# Patient Record
Sex: FEMALE | Race: WHITE | HISPANIC OR LATINO | Employment: FULL TIME | URBAN - METROPOLITAN AREA
[De-identification: names, ages, dates, MRNs, and addresses within clinical notes are randomized per-mention and may not be internally consistent; named-entity substitution may affect disease eponyms.]

---

## 2019-08-21 PROBLEM — E28.2 PCOS (POLYCYSTIC OVARIAN SYNDROME): Status: ACTIVE | Noted: 2019-08-21

## 2021-04-21 PROBLEM — M54.2 NECK PAIN: Status: ACTIVE | Noted: 2021-04-21

## 2021-04-21 PROBLEM — M25.511 ACUTE PAIN OF RIGHT SHOULDER: Status: ACTIVE | Noted: 2021-04-21

## 2021-04-28 ENCOUNTER — OFFICE VISIT (OUTPATIENT)
Dept: NEUROSURGERY | Facility: CLINIC | Age: 31
End: 2021-04-28
Payer: OTHER GOVERNMENT

## 2021-04-28 VITALS
DIASTOLIC BLOOD PRESSURE: 87 MMHG | BODY MASS INDEX: 26.68 KG/M2 | HEART RATE: 107 BPM | HEIGHT: 67 IN | WEIGHT: 170 LBS | SYSTOLIC BLOOD PRESSURE: 137 MMHG

## 2021-04-28 DIAGNOSIS — M54.12 CERVICAL RADICULOPATHY: ICD-10-CM

## 2021-04-28 DIAGNOSIS — M54.2 NECK PAIN: ICD-10-CM

## 2021-04-28 PROCEDURE — 99204 OFFICE O/P NEW MOD 45 MIN: CPT | Mod: PBBFAC,PN | Performed by: PHYSICIAN ASSISTANT

## 2021-04-28 PROCEDURE — 99999 PR PBB SHADOW E&M-NEW PATIENT-LVL IV: CPT | Mod: PBBFAC,,, | Performed by: PHYSICIAN ASSISTANT

## 2021-04-28 PROCEDURE — 99204 PR OFFICE/OUTPT VISIT, NEW, LEVL IV, 45-59 MIN: ICD-10-PCS | Mod: S$PBB,,, | Performed by: PHYSICIAN ASSISTANT

## 2021-04-28 PROCEDURE — 99999 PR PBB SHADOW E&M-NEW PATIENT-LVL IV: ICD-10-PCS | Mod: PBBFAC,,, | Performed by: PHYSICIAN ASSISTANT

## 2021-04-28 PROCEDURE — 99204 OFFICE O/P NEW MOD 45 MIN: CPT | Mod: S$PBB,,, | Performed by: PHYSICIAN ASSISTANT

## 2021-05-10 ENCOUNTER — PATIENT MESSAGE (OUTPATIENT)
Dept: RESEARCH | Facility: HOSPITAL | Age: 31
End: 2021-05-10

## 2021-05-11 ENCOUNTER — HOSPITAL ENCOUNTER (OUTPATIENT)
Dept: RADIOLOGY | Facility: HOSPITAL | Age: 31
Discharge: HOME | End: 2021-05-11
Attending: PHYSICIAN ASSISTANT
Payer: OTHER GOVERNMENT

## 2021-05-11 DIAGNOSIS — M54.12 CERVICAL RADICULOPATHY: ICD-10-CM

## 2021-05-11 PROCEDURE — 72040 X-RAY EXAM NECK SPINE 2-3 VW: CPT | Mod: TC,FY,PO

## 2021-05-11 PROCEDURE — 72040 X-RAY EXAM NECK SPINE 2-3 VW: CPT | Mod: 26,,, | Performed by: RADIOLOGY

## 2021-05-11 PROCEDURE — 72040 XR CERVICAL SPINE FLEXION  AND EXTENSION ONLY: ICD-10-PCS | Mod: 26,,, | Performed by: RADIOLOGY

## 2021-05-14 ENCOUNTER — TELEPHONE (OUTPATIENT)
Dept: PAIN MEDICINE | Facility: CLINIC | Age: 31
End: 2021-05-14

## 2021-05-14 DIAGNOSIS — Z11.9 SCREENING EXAMINATION FOR INFECTIOUS DISEASE: ICD-10-CM

## 2021-05-14 DIAGNOSIS — M54.12 CERVICAL RADICULOPATHY: Primary | ICD-10-CM

## 2021-05-14 RX ORDER — SODIUM CHLORIDE, SODIUM LACTATE, POTASSIUM CHLORIDE, CALCIUM CHLORIDE 600; 310; 30; 20 MG/100ML; MG/100ML; MG/100ML; MG/100ML
INJECTION, SOLUTION INTRAVENOUS CONTINUOUS
Status: CANCELLED | OUTPATIENT
Start: 2021-05-31

## 2021-05-28 ENCOUNTER — LAB VISIT (OUTPATIENT)
Dept: FAMILY MEDICINE | Facility: CLINIC | Age: 31
End: 2021-05-28
Payer: OTHER GOVERNMENT

## 2021-05-28 DIAGNOSIS — Z11.9 SCREENING EXAMINATION FOR INFECTIOUS DISEASE: ICD-10-CM

## 2021-05-28 PROCEDURE — U0003 INFECTIOUS AGENT DETECTION BY NUCLEIC ACID (DNA OR RNA); SEVERE ACUTE RESPIRATORY SYNDROME CORONAVIRUS 2 (SARS-COV-2) (CORONAVIRUS DISEASE [COVID-19]), AMPLIFIED PROBE TECHNIQUE, MAKING USE OF HIGH THROUGHPUT TECHNOLOGIES AS DESCRIBED BY CMS-2020-01-R: HCPCS | Performed by: ANESTHESIOLOGY

## 2021-05-28 PROCEDURE — U0005 INFEC AGEN DETEC AMPLI PROBE: HCPCS | Performed by: ANESTHESIOLOGY

## 2021-05-29 LAB — SARS-COV-2 RNA RESP QL NAA+PROBE: NOT DETECTED

## 2021-05-31 ENCOUNTER — HOSPITAL ENCOUNTER (OUTPATIENT)
Dept: RADIOLOGY | Facility: HOSPITAL | Age: 31
Discharge: HOME | End: 2021-05-31
Attending: ANESTHESIOLOGY
Payer: OTHER GOVERNMENT

## 2021-05-31 ENCOUNTER — HOSPITAL ENCOUNTER (OUTPATIENT)
Facility: HOSPITAL | Age: 31
Discharge: HOME | End: 2021-05-31
Attending: ANESTHESIOLOGY | Admitting: ANESTHESIOLOGY
Payer: OTHER GOVERNMENT

## 2021-05-31 DIAGNOSIS — M54.12 CERVICAL RADICULOPATHY: ICD-10-CM

## 2021-05-31 DIAGNOSIS — M54.2 NECK PAIN: ICD-10-CM

## 2021-05-31 LAB
B-HCG UR QL: NEGATIVE
CTP QC/QA: YES

## 2021-05-31 PROCEDURE — 62321 NJX INTERLAMINAR CRV/THRC: CPT | Mod: PO | Performed by: ANESTHESIOLOGY

## 2021-05-31 PROCEDURE — 76000 FLUOROSCOPY <1 HR PHYS/QHP: CPT | Mod: TC,PO

## 2021-05-31 PROCEDURE — 62321 NJX INTERLAMINAR CRV/THRC: CPT | Mod: ,,, | Performed by: ANESTHESIOLOGY

## 2021-05-31 PROCEDURE — 81025 URINE PREGNANCY TEST: CPT | Mod: PO | Performed by: ANESTHESIOLOGY

## 2021-05-31 PROCEDURE — 62321 PR INJ CERV/THORAC, W/GUIDANCE: ICD-10-PCS | Mod: ,,, | Performed by: ANESTHESIOLOGY

## 2021-05-31 PROCEDURE — 25500020 PHARM REV CODE 255: Mod: PO | Performed by: ANESTHESIOLOGY

## 2021-05-31 PROCEDURE — 25000003 PHARM REV CODE 250: Mod: PO | Performed by: ANESTHESIOLOGY

## 2021-05-31 PROCEDURE — A4216 STERILE WATER/SALINE, 10 ML: HCPCS | Mod: PO | Performed by: ANESTHESIOLOGY

## 2021-05-31 PROCEDURE — 63600175 PHARM REV CODE 636 W HCPCS: Mod: PO | Performed by: ANESTHESIOLOGY

## 2021-05-31 RX ORDER — SODIUM CHLORIDE, SODIUM LACTATE, POTASSIUM CHLORIDE, CALCIUM CHLORIDE 600; 310; 30; 20 MG/100ML; MG/100ML; MG/100ML; MG/100ML
INJECTION, SOLUTION INTRAVENOUS CONTINUOUS
Status: DISCONTINUED | OUTPATIENT
Start: 2021-05-31 | End: 2021-05-31 | Stop reason: HOSPADM

## 2021-05-31 RX ORDER — METHYLPREDNISOLONE ACETATE 80 MG/ML
INJECTION, SUSPENSION INTRA-ARTICULAR; INTRALESIONAL; INTRAMUSCULAR; SOFT TISSUE
Status: DISCONTINUED | OUTPATIENT
Start: 2021-05-31 | End: 2021-05-31 | Stop reason: HOSPADM

## 2021-05-31 RX ORDER — LIDOCAINE HYDROCHLORIDE 10 MG/ML
INJECTION, SOLUTION EPIDURAL; INFILTRATION; INTRACAUDAL; PERINEURAL
Status: DISCONTINUED | OUTPATIENT
Start: 2021-05-31 | End: 2021-05-31 | Stop reason: HOSPADM

## 2021-05-31 RX ORDER — MIDAZOLAM HYDROCHLORIDE 2 MG/2ML
INJECTION, SOLUTION INTRAMUSCULAR; INTRAVENOUS
Status: DISCONTINUED | OUTPATIENT
Start: 2021-05-31 | End: 2021-05-31 | Stop reason: HOSPADM

## 2021-05-31 RX ORDER — SODIUM CHLORIDE 9 MG/ML
INJECTION, SOLUTION INTRAMUSCULAR; INTRAVENOUS; SUBCUTANEOUS
Status: DISCONTINUED | OUTPATIENT
Start: 2021-05-31 | End: 2021-05-31 | Stop reason: HOSPADM

## 2021-05-31 RX ADMIN — SODIUM CHLORIDE, SODIUM LACTATE, POTASSIUM CHLORIDE, AND CALCIUM CHLORIDE: .6; .31; .03; .02 INJECTION, SOLUTION INTRAVENOUS at 12:05

## 2021-06-01 VITALS
SYSTOLIC BLOOD PRESSURE: 104 MMHG | DIASTOLIC BLOOD PRESSURE: 72 MMHG | OXYGEN SATURATION: 100 % | TEMPERATURE: 98 F | HEIGHT: 67 IN | BODY MASS INDEX: 26.53 KG/M2 | WEIGHT: 169 LBS | RESPIRATION RATE: 16 BRPM | HEART RATE: 83 BPM

## 2021-06-25 ENCOUNTER — OFFICE VISIT (OUTPATIENT)
Dept: PAIN MEDICINE | Facility: CLINIC | Age: 31
End: 2021-06-25
Payer: OTHER GOVERNMENT

## 2021-06-25 ENCOUNTER — HOSPITAL ENCOUNTER (OUTPATIENT)
Dept: RADIOLOGY | Facility: HOSPITAL | Age: 31
Discharge: HOME | End: 2021-06-25
Attending: ANESTHESIOLOGY
Payer: OTHER GOVERNMENT

## 2021-06-25 VITALS
HEIGHT: 67 IN | TEMPERATURE: 98 F | SYSTOLIC BLOOD PRESSURE: 112 MMHG | DIASTOLIC BLOOD PRESSURE: 60 MMHG | BODY MASS INDEX: 27.86 KG/M2 | RESPIRATION RATE: 20 BRPM | OXYGEN SATURATION: 99 % | WEIGHT: 177.5 LBS | HEART RATE: 100 BPM

## 2021-06-25 DIAGNOSIS — M54.9 DORSALGIA, UNSPECIFIED: ICD-10-CM

## 2021-06-25 DIAGNOSIS — M50.30 DDD (DEGENERATIVE DISC DISEASE), CERVICAL: ICD-10-CM

## 2021-06-25 DIAGNOSIS — M51.36 DDD (DEGENERATIVE DISC DISEASE), LUMBAR: ICD-10-CM

## 2021-06-25 DIAGNOSIS — M47.812 CERVICAL SPONDYLOSIS: ICD-10-CM

## 2021-06-25 DIAGNOSIS — G44.89 HEADACHE SYNDROME: ICD-10-CM

## 2021-06-25 DIAGNOSIS — M51.36 DDD (DEGENERATIVE DISC DISEASE), LUMBAR: Primary | ICD-10-CM

## 2021-06-25 PROCEDURE — 99214 OFFICE O/P EST MOD 30 MIN: CPT | Mod: PBBFAC,25,PN | Performed by: ANESTHESIOLOGY

## 2021-06-25 PROCEDURE — 72110 X-RAY EXAM L-2 SPINE 4/>VWS: CPT | Mod: 26,,, | Performed by: RADIOLOGY

## 2021-06-25 PROCEDURE — 72110 XR LUMBAR SPINE COMPLETE 5 VIEW: ICD-10-PCS | Mod: 26,,, | Performed by: RADIOLOGY

## 2021-06-25 PROCEDURE — 99999 PR PBB SHADOW E&M-EST. PATIENT-LVL IV: ICD-10-PCS | Mod: PBBFAC,,, | Performed by: ANESTHESIOLOGY

## 2021-06-25 PROCEDURE — 72110 X-RAY EXAM L-2 SPINE 4/>VWS: CPT | Mod: TC,PO

## 2021-06-25 PROCEDURE — 99215 PR OFFICE/OUTPT VISIT, EST, LEVL V, 40-54 MIN: ICD-10-PCS | Mod: S$PBB,,, | Performed by: ANESTHESIOLOGY

## 2021-06-25 PROCEDURE — 99215 OFFICE O/P EST HI 40 MIN: CPT | Mod: S$PBB,,, | Performed by: ANESTHESIOLOGY

## 2021-06-25 PROCEDURE — 99999 PR PBB SHADOW E&M-EST. PATIENT-LVL IV: CPT | Mod: PBBFAC,,, | Performed by: ANESTHESIOLOGY

## 2021-06-25 RX ORDER — NABUMETONE 750 MG/1
750 TABLET, FILM COATED ORAL 2 TIMES DAILY PRN
Qty: 30 TABLET | Refills: 1 | Status: SHIPPED | OUTPATIENT
Start: 2021-06-25 | End: 2021-09-24 | Stop reason: SDUPTHER

## 2021-06-25 RX ORDER — TIZANIDINE 2 MG/1
4 TABLET ORAL NIGHTLY PRN
Qty: 30 TABLET | Refills: 1 | Status: SHIPPED | OUTPATIENT
Start: 2021-06-25 | End: 2021-09-24 | Stop reason: SDUPTHER

## 2021-06-28 ENCOUNTER — TELEPHONE (OUTPATIENT)
Dept: PAIN MEDICINE | Facility: CLINIC | Age: 31
End: 2021-06-28

## 2021-06-30 ENCOUNTER — TELEPHONE (OUTPATIENT)
Dept: PAIN MEDICINE | Facility: CLINIC | Age: 31
End: 2021-06-30

## 2021-06-30 ENCOUNTER — PATIENT MESSAGE (OUTPATIENT)
Dept: PAIN MEDICINE | Facility: CLINIC | Age: 31
End: 2021-06-30

## 2021-07-02 ENCOUNTER — TELEPHONE (OUTPATIENT)
Dept: PAIN MEDICINE | Facility: CLINIC | Age: 31
End: 2021-07-02

## 2021-07-02 DIAGNOSIS — M51.36 DDD (DEGENERATIVE DISC DISEASE), LUMBAR: Primary | ICD-10-CM

## 2021-07-16 ENCOUNTER — PATIENT MESSAGE (OUTPATIENT)
Dept: PAIN MEDICINE | Facility: CLINIC | Age: 31
End: 2021-07-16

## 2021-07-19 ENCOUNTER — PATIENT MESSAGE (OUTPATIENT)
Dept: PAIN MEDICINE | Facility: CLINIC | Age: 31
End: 2021-07-19

## 2021-07-19 DIAGNOSIS — M54.16 BILATERAL LUMBAR RADICULOPATHY: Primary | ICD-10-CM

## 2021-07-19 DIAGNOSIS — M54.9 DORSALGIA, UNSPECIFIED: ICD-10-CM

## 2021-07-20 ENCOUNTER — PATIENT MESSAGE (OUTPATIENT)
Dept: PAIN MEDICINE | Facility: CLINIC | Age: 31
End: 2021-07-20

## 2021-07-22 RX ORDER — GABAPENTIN 300 MG/1
300 CAPSULE ORAL NIGHTLY
Qty: 30 CAPSULE | Refills: 1 | Status: SHIPPED | OUTPATIENT
Start: 2021-07-22 | End: 2021-09-24 | Stop reason: SDUPTHER

## 2021-07-23 ENCOUNTER — PATIENT MESSAGE (OUTPATIENT)
Dept: PAIN MEDICINE | Facility: CLINIC | Age: 31
End: 2021-07-23

## 2021-08-06 ENCOUNTER — TELEPHONE (OUTPATIENT)
Dept: PAIN MEDICINE | Facility: CLINIC | Age: 31
End: 2021-08-06

## 2021-08-06 ENCOUNTER — HOSPITAL ENCOUNTER (OUTPATIENT)
Dept: RADIOLOGY | Facility: HOSPITAL | Age: 31
Discharge: HOME | End: 2021-08-06
Attending: ANESTHESIOLOGY
Payer: OTHER GOVERNMENT

## 2021-08-06 DIAGNOSIS — M54.16 BILATERAL LUMBAR RADICULOPATHY: ICD-10-CM

## 2021-08-06 DIAGNOSIS — Z01.818 PRE-OP TESTING: ICD-10-CM

## 2021-08-06 DIAGNOSIS — M54.9 DORSALGIA, UNSPECIFIED: ICD-10-CM

## 2021-08-06 PROCEDURE — 72148 MRI LUMBAR SPINE WITHOUT CONTRAST: ICD-10-PCS | Mod: 26,,, | Performed by: RADIOLOGY

## 2021-08-06 PROCEDURE — 72148 MRI LUMBAR SPINE W/O DYE: CPT | Mod: 26,,, | Performed by: RADIOLOGY

## 2021-08-06 PROCEDURE — 72148 MRI LUMBAR SPINE W/O DYE: CPT | Mod: TC,PO

## 2021-08-09 DIAGNOSIS — M54.16 LUMBAR RADICULOPATHY: Primary | ICD-10-CM

## 2021-08-09 RX ORDER — ALPRAZOLAM 0.5 MG/1
1 TABLET, ORALLY DISINTEGRATING ORAL ONCE AS NEEDED
Status: CANCELLED | OUTPATIENT
Start: 2021-08-26 | End: 2033-01-21

## 2021-08-20 ENCOUNTER — PATIENT MESSAGE (OUTPATIENT)
Dept: SURGERY | Facility: HOSPITAL | Age: 31
End: 2021-08-20

## 2021-08-20 DIAGNOSIS — M54.16 LEFT LUMBAR RADICULOPATHY: ICD-10-CM

## 2021-08-20 DIAGNOSIS — M51.36 DDD (DEGENERATIVE DISC DISEASE), LUMBAR: Primary | ICD-10-CM

## 2021-08-23 ENCOUNTER — LAB VISIT (OUTPATIENT)
Dept: FAMILY MEDICINE | Facility: CLINIC | Age: 31
End: 2021-08-23
Payer: OTHER GOVERNMENT

## 2021-08-23 DIAGNOSIS — Z01.818 PRE-OP TESTING: ICD-10-CM

## 2021-08-23 PROCEDURE — U0005 INFEC AGEN DETEC AMPLI PROBE: HCPCS | Performed by: ANESTHESIOLOGY

## 2021-08-23 PROCEDURE — U0003 INFECTIOUS AGENT DETECTION BY NUCLEIC ACID (DNA OR RNA); SEVERE ACUTE RESPIRATORY SYNDROME CORONAVIRUS 2 (SARS-COV-2) (CORONAVIRUS DISEASE [COVID-19]), AMPLIFIED PROBE TECHNIQUE, MAKING USE OF HIGH THROUGHPUT TECHNOLOGIES AS DESCRIBED BY CMS-2020-01-R: HCPCS | Performed by: ANESTHESIOLOGY

## 2021-08-24 LAB
SARS-COV-2 RNA RESP QL NAA+PROBE: NOT DETECTED
SARS-COV-2- CYCLE NUMBER: NORMAL

## 2021-08-26 ENCOUNTER — HOSPITAL ENCOUNTER (OUTPATIENT)
Dept: RADIOLOGY | Facility: HOSPITAL | Age: 31
Discharge: HOME | End: 2021-08-26
Attending: ANESTHESIOLOGY
Payer: OTHER GOVERNMENT

## 2021-08-26 ENCOUNTER — HOSPITAL ENCOUNTER (OUTPATIENT)
Facility: HOSPITAL | Age: 31
Discharge: HOME | End: 2021-08-26
Attending: ANESTHESIOLOGY | Admitting: ANESTHESIOLOGY
Payer: OTHER GOVERNMENT

## 2021-08-26 DIAGNOSIS — M51.36 DDD (DEGENERATIVE DISC DISEASE), LUMBAR: ICD-10-CM

## 2021-08-26 DIAGNOSIS — M54.16 LUMBAR RADICULOPATHY: ICD-10-CM

## 2021-08-26 LAB
B-HCG UR QL: NEGATIVE
CTP QC/QA: YES

## 2021-08-26 PROCEDURE — 62323 NJX INTERLAMINAR LMBR/SAC: CPT | Mod: PO | Performed by: ANESTHESIOLOGY

## 2021-08-26 PROCEDURE — 62323 NJX INTERLAMINAR LMBR/SAC: CPT | Mod: ,,, | Performed by: ANESTHESIOLOGY

## 2021-08-26 PROCEDURE — 25500020 PHARM REV CODE 255: Mod: PO | Performed by: ANESTHESIOLOGY

## 2021-08-26 PROCEDURE — 81025 URINE PREGNANCY TEST: CPT | Mod: PO | Performed by: ANESTHESIOLOGY

## 2021-08-26 PROCEDURE — 76000 FLUOROSCOPY <1 HR PHYS/QHP: CPT | Mod: TC,PO

## 2021-08-26 PROCEDURE — 63600175 PHARM REV CODE 636 W HCPCS: Mod: PO | Performed by: ANESTHESIOLOGY

## 2021-08-26 PROCEDURE — 62323 PR INJ LUMBAR/SACRAL, W/IMAGING GUIDANCE: ICD-10-PCS | Mod: ,,, | Performed by: ANESTHESIOLOGY

## 2021-08-26 PROCEDURE — 25000003 PHARM REV CODE 250: Mod: PO | Performed by: ANESTHESIOLOGY

## 2021-08-26 RX ORDER — METHYLPREDNISOLONE ACETATE 80 MG/ML
INJECTION, SUSPENSION INTRA-ARTICULAR; INTRALESIONAL; INTRAMUSCULAR; SOFT TISSUE
Status: DISCONTINUED | OUTPATIENT
Start: 2021-08-26 | End: 2021-08-26 | Stop reason: HOSPADM

## 2021-08-26 RX ORDER — LIDOCAINE HYDROCHLORIDE 10 MG/ML
INJECTION, SOLUTION EPIDURAL; INFILTRATION; INTRACAUDAL; PERINEURAL
Status: DISCONTINUED | OUTPATIENT
Start: 2021-08-26 | End: 2021-08-26 | Stop reason: HOSPADM

## 2021-08-26 RX ORDER — ALPRAZOLAM 0.5 MG/1
1 TABLET, ORALLY DISINTEGRATING ORAL ONCE AS NEEDED
Status: COMPLETED | OUTPATIENT
Start: 2021-08-26 | End: 2021-08-26

## 2021-08-26 RX ADMIN — ALPRAZOLAM 1 MG: 0.5 TABLET, ORALLY DISINTEGRATING ORAL at 02:08

## 2021-08-27 VITALS
TEMPERATURE: 98 F | WEIGHT: 171 LBS | OXYGEN SATURATION: 99 % | SYSTOLIC BLOOD PRESSURE: 110 MMHG | HEIGHT: 67 IN | RESPIRATION RATE: 16 BRPM | BODY MASS INDEX: 26.84 KG/M2 | DIASTOLIC BLOOD PRESSURE: 68 MMHG | HEART RATE: 74 BPM

## 2021-09-16 ENCOUNTER — OFFICE VISIT (OUTPATIENT)
Dept: PAIN MEDICINE | Facility: CLINIC | Age: 31
End: 2021-09-16
Payer: OTHER GOVERNMENT

## 2021-09-16 VITALS
BODY MASS INDEX: 28.37 KG/M2 | OXYGEN SATURATION: 99 % | WEIGHT: 181.13 LBS | DIASTOLIC BLOOD PRESSURE: 59 MMHG | TEMPERATURE: 98 F | SYSTOLIC BLOOD PRESSURE: 110 MMHG | RESPIRATION RATE: 18 BRPM | HEART RATE: 87 BPM

## 2021-09-16 DIAGNOSIS — M54.16 LUMBAR RADICULOPATHY: Primary | ICD-10-CM

## 2021-09-16 DIAGNOSIS — Z01.818 PRE-OP TESTING: ICD-10-CM

## 2021-09-16 DIAGNOSIS — M51.36 DDD (DEGENERATIVE DISC DISEASE), LUMBAR: ICD-10-CM

## 2021-09-16 DIAGNOSIS — M54.12 CERVICAL RADICULOPATHY: ICD-10-CM

## 2021-09-16 DIAGNOSIS — M47.812 CERVICAL SPONDYLOSIS: ICD-10-CM

## 2021-09-16 PROCEDURE — 99999 PR PBB SHADOW E&M-EST. PATIENT-LVL IV: ICD-10-PCS | Mod: PBBFAC,,, | Performed by: ANESTHESIOLOGY

## 2021-09-16 PROCEDURE — 99213 PR OFFICE/OUTPT VISIT, EST, LEVL III, 20-29 MIN: ICD-10-PCS | Mod: S$PBB,,, | Performed by: ANESTHESIOLOGY

## 2021-09-16 PROCEDURE — 99214 OFFICE O/P EST MOD 30 MIN: CPT | Mod: PBBFAC,PN | Performed by: ANESTHESIOLOGY

## 2021-09-16 PROCEDURE — 99999 PR PBB SHADOW E&M-EST. PATIENT-LVL IV: CPT | Mod: PBBFAC,,, | Performed by: ANESTHESIOLOGY

## 2021-09-16 PROCEDURE — 99213 OFFICE O/P EST LOW 20 MIN: CPT | Mod: S$PBB,,, | Performed by: ANESTHESIOLOGY

## 2021-09-16 RX ORDER — ALPRAZOLAM 0.5 MG/1
1 TABLET, ORALLY DISINTEGRATING ORAL ONCE AS NEEDED
Status: CANCELLED | OUTPATIENT
Start: 2021-09-27 | End: 2033-02-22

## 2021-09-23 ENCOUNTER — TELEPHONE (OUTPATIENT)
Dept: PAIN MEDICINE | Facility: CLINIC | Age: 31
End: 2021-09-23

## 2021-09-24 RX ORDER — NABUMETONE 750 MG/1
750 TABLET, FILM COATED ORAL 2 TIMES DAILY PRN
Qty: 30 TABLET | Refills: 2 | Status: SHIPPED | OUTPATIENT
Start: 2021-09-24 | End: 2022-11-09

## 2021-09-24 RX ORDER — TIZANIDINE 2 MG/1
4 TABLET ORAL NIGHTLY PRN
Qty: 30 TABLET | Refills: 2 | Status: SHIPPED | OUTPATIENT
Start: 2021-09-24 | End: 2022-02-02

## 2021-09-24 RX ORDER — GABAPENTIN 300 MG/1
300 CAPSULE ORAL NIGHTLY
Qty: 30 CAPSULE | Refills: 5 | Status: SHIPPED | OUTPATIENT
Start: 2021-09-24 | End: 2021-11-12 | Stop reason: SDUPTHER

## 2021-09-28 ENCOUNTER — TELEPHONE (OUTPATIENT)
Dept: PAIN MEDICINE | Facility: CLINIC | Age: 31
End: 2021-09-28

## 2021-09-29 ENCOUNTER — TELEPHONE (OUTPATIENT)
Dept: PAIN MEDICINE | Facility: CLINIC | Age: 31
End: 2021-09-29

## 2021-10-04 ENCOUNTER — TELEPHONE (OUTPATIENT)
Dept: PAIN MEDICINE | Facility: CLINIC | Age: 31
End: 2021-10-04

## 2021-10-06 ENCOUNTER — PATIENT MESSAGE (OUTPATIENT)
Dept: SURGERY | Facility: HOSPITAL | Age: 31
End: 2021-10-06

## 2021-10-06 DIAGNOSIS — M54.12 CERVICAL RADICULOPATHY: ICD-10-CM

## 2021-10-06 DIAGNOSIS — M54.16 LUMBAR RADICULOPATHY: Primary | ICD-10-CM

## 2021-10-21 ENCOUNTER — HOSPITAL ENCOUNTER (OUTPATIENT)
Facility: HOSPITAL | Age: 31
Discharge: HOME | End: 2021-10-21
Attending: ANESTHESIOLOGY | Admitting: ANESTHESIOLOGY
Payer: OTHER GOVERNMENT

## 2021-10-21 ENCOUNTER — HOSPITAL ENCOUNTER (OUTPATIENT)
Dept: RADIOLOGY | Facility: HOSPITAL | Age: 31
Discharge: HOME | End: 2021-10-21
Attending: ANESTHESIOLOGY
Payer: OTHER GOVERNMENT

## 2021-10-21 DIAGNOSIS — M54.16 LUMBAR RADICULOPATHY: ICD-10-CM

## 2021-10-21 DIAGNOSIS — M51.36 DDD (DEGENERATIVE DISC DISEASE), LUMBAR: ICD-10-CM

## 2021-10-21 LAB
B-HCG UR QL: NEGATIVE
CTP QC/QA: YES

## 2021-10-21 PROCEDURE — 63600175 PHARM REV CODE 636 W HCPCS: Mod: PO | Performed by: ANESTHESIOLOGY

## 2021-10-21 PROCEDURE — 25500020 PHARM REV CODE 255: Mod: PO | Performed by: ANESTHESIOLOGY

## 2021-10-21 PROCEDURE — 25000003 PHARM REV CODE 250: Mod: PO | Performed by: ANESTHESIOLOGY

## 2021-10-21 PROCEDURE — 64483 NJX AA&/STRD TFRM EPI L/S 1: CPT | Mod: PO | Performed by: ANESTHESIOLOGY

## 2021-10-21 PROCEDURE — 81025 URINE PREGNANCY TEST: CPT | Mod: PO | Performed by: ANESTHESIOLOGY

## 2021-10-21 PROCEDURE — 64483 PR EPIDURAL INJ, ANES/STEROID, TRANSFORAMINAL, LUMB/SACR, SNGL LEVL: ICD-10-PCS | Mod: LT,,, | Performed by: ANESTHESIOLOGY

## 2021-10-21 PROCEDURE — 64483 NJX AA&/STRD TFRM EPI L/S 1: CPT | Mod: LT,,, | Performed by: ANESTHESIOLOGY

## 2021-10-21 PROCEDURE — 76000 FLUOROSCOPY <1 HR PHYS/QHP: CPT | Mod: TC,PO

## 2021-10-21 RX ORDER — ALPRAZOLAM 0.5 MG/1
1 TABLET, ORALLY DISINTEGRATING ORAL ONCE AS NEEDED
Status: COMPLETED | OUTPATIENT
Start: 2021-10-21 | End: 2021-10-21

## 2021-10-21 RX ORDER — BUPIVACAINE HYDROCHLORIDE 2.5 MG/ML
INJECTION, SOLUTION EPIDURAL; INFILTRATION; INTRACAUDAL
Status: DISCONTINUED | OUTPATIENT
Start: 2021-10-21 | End: 2021-10-21 | Stop reason: HOSPADM

## 2021-10-21 RX ORDER — METHYLPREDNISOLONE ACETATE 80 MG/ML
INJECTION, SUSPENSION INTRA-ARTICULAR; INTRALESIONAL; INTRAMUSCULAR; SOFT TISSUE
Status: DISCONTINUED | OUTPATIENT
Start: 2021-10-21 | End: 2021-10-21 | Stop reason: HOSPADM

## 2021-10-21 RX ORDER — LIDOCAINE HYDROCHLORIDE 10 MG/ML
INJECTION, SOLUTION EPIDURAL; INFILTRATION; INTRACAUDAL; PERINEURAL
Status: DISCONTINUED | OUTPATIENT
Start: 2021-10-21 | End: 2021-10-21 | Stop reason: HOSPADM

## 2021-10-21 RX ADMIN — ALPRAZOLAM 1 MG: 0.5 TABLET, ORALLY DISINTEGRATING ORAL at 03:10

## 2021-10-22 VITALS
HEART RATE: 84 BPM | OXYGEN SATURATION: 100 % | TEMPERATURE: 99 F | SYSTOLIC BLOOD PRESSURE: 109 MMHG | DIASTOLIC BLOOD PRESSURE: 65 MMHG | RESPIRATION RATE: 10 BRPM

## 2021-11-12 ENCOUNTER — OFFICE VISIT (OUTPATIENT)
Dept: PAIN MEDICINE | Facility: CLINIC | Age: 31
End: 2021-11-12
Payer: OTHER GOVERNMENT

## 2021-11-12 VITALS
DIASTOLIC BLOOD PRESSURE: 61 MMHG | OXYGEN SATURATION: 100 % | WEIGHT: 180.56 LBS | HEART RATE: 84 BPM | TEMPERATURE: 97 F | BODY MASS INDEX: 28.28 KG/M2 | RESPIRATION RATE: 20 BRPM | SYSTOLIC BLOOD PRESSURE: 111 MMHG

## 2021-11-12 DIAGNOSIS — M51.36 DDD (DEGENERATIVE DISC DISEASE), LUMBAR: ICD-10-CM

## 2021-11-12 DIAGNOSIS — M54.16 LUMBAR RADICULOPATHY: Primary | ICD-10-CM

## 2021-11-12 DIAGNOSIS — M54.12 CERVICAL RADICULOPATHY: ICD-10-CM

## 2021-11-12 DIAGNOSIS — M50.30 DDD (DEGENERATIVE DISC DISEASE), CERVICAL: ICD-10-CM

## 2021-11-12 PROCEDURE — 99214 PR OFFICE/OUTPT VISIT, EST, LEVL IV, 30-39 MIN: ICD-10-PCS | Mod: S$PBB,,, | Performed by: PHYSICIAN ASSISTANT

## 2021-11-12 PROCEDURE — 99214 OFFICE O/P EST MOD 30 MIN: CPT | Mod: S$PBB,,, | Performed by: PHYSICIAN ASSISTANT

## 2021-11-12 PROCEDURE — 99214 OFFICE O/P EST MOD 30 MIN: CPT | Mod: PBBFAC,PN | Performed by: PHYSICIAN ASSISTANT

## 2021-11-12 PROCEDURE — 99999 PR PBB SHADOW E&M-EST. PATIENT-LVL IV: CPT | Mod: PBBFAC,,, | Performed by: PHYSICIAN ASSISTANT

## 2021-11-12 PROCEDURE — 99999 PR PBB SHADOW E&M-EST. PATIENT-LVL IV: ICD-10-PCS | Mod: PBBFAC,,, | Performed by: PHYSICIAN ASSISTANT

## 2021-11-12 RX ORDER — GABAPENTIN 300 MG/1
600 CAPSULE ORAL NIGHTLY
Qty: 60 CAPSULE | Refills: 5 | Status: SHIPPED | OUTPATIENT
Start: 2021-11-12 | End: 2022-03-28

## 2021-11-12 RX ORDER — ALPRAZOLAM 0.5 MG/1
1 TABLET, ORALLY DISINTEGRATING ORAL ONCE AS NEEDED
Status: CANCELLED | OUTPATIENT
Start: 2021-12-06 | End: 2033-05-03

## 2021-12-06 ENCOUNTER — HOSPITAL ENCOUNTER (OUTPATIENT)
Dept: RADIOLOGY | Facility: HOSPITAL | Age: 31
Discharge: HOME | End: 2021-12-06
Attending: ANESTHESIOLOGY
Payer: OTHER GOVERNMENT

## 2021-12-06 ENCOUNTER — HOSPITAL ENCOUNTER (OUTPATIENT)
Facility: HOSPITAL | Age: 31
Discharge: HOME | End: 2021-12-06
Attending: ANESTHESIOLOGY | Admitting: ANESTHESIOLOGY
Payer: OTHER GOVERNMENT

## 2021-12-06 VITALS
RESPIRATION RATE: 16 BRPM | BODY MASS INDEX: 27.62 KG/M2 | TEMPERATURE: 98 F | OXYGEN SATURATION: 99 % | HEIGHT: 67 IN | WEIGHT: 176 LBS | DIASTOLIC BLOOD PRESSURE: 68 MMHG | SYSTOLIC BLOOD PRESSURE: 130 MMHG | HEART RATE: 78 BPM

## 2021-12-06 DIAGNOSIS — M54.16 LUMBAR RADICULOPATHY: ICD-10-CM

## 2021-12-06 DIAGNOSIS — M54.50 LOWER BACK PAIN: ICD-10-CM

## 2021-12-06 PROCEDURE — 64483 NJX AA&/STRD TFRM EPI L/S 1: CPT | Mod: LT,,, | Performed by: ANESTHESIOLOGY

## 2021-12-06 PROCEDURE — 64483 PR EPIDURAL INJ, ANES/STEROID, TRANSFORAMINAL, LUMB/SACR, SNGL LEVL: ICD-10-PCS | Mod: LT,,, | Performed by: ANESTHESIOLOGY

## 2021-12-06 PROCEDURE — 25000003 PHARM REV CODE 250: Mod: PO | Performed by: ANESTHESIOLOGY

## 2021-12-06 PROCEDURE — 63600175 PHARM REV CODE 636 W HCPCS: Mod: PO | Performed by: ANESTHESIOLOGY

## 2021-12-06 PROCEDURE — 64483 NJX AA&/STRD TFRM EPI L/S 1: CPT | Mod: PO | Performed by: ANESTHESIOLOGY

## 2021-12-06 PROCEDURE — 76000 FLUOROSCOPY <1 HR PHYS/QHP: CPT | Mod: TC,PO

## 2021-12-06 PROCEDURE — 25500020 PHARM REV CODE 255: Mod: PO | Performed by: ANESTHESIOLOGY

## 2021-12-06 RX ORDER — LIDOCAINE HYDROCHLORIDE 10 MG/ML
INJECTION, SOLUTION EPIDURAL; INFILTRATION; INTRACAUDAL; PERINEURAL
Status: DISCONTINUED | OUTPATIENT
Start: 2021-12-06 | End: 2021-12-06 | Stop reason: HOSPADM

## 2021-12-06 RX ORDER — ALPRAZOLAM 0.5 MG/1
1 TABLET, ORALLY DISINTEGRATING ORAL ONCE AS NEEDED
Status: COMPLETED | OUTPATIENT
Start: 2021-12-06 | End: 2021-12-06

## 2021-12-06 RX ORDER — BUPIVACAINE HYDROCHLORIDE 2.5 MG/ML
INJECTION, SOLUTION EPIDURAL; INFILTRATION; INTRACAUDAL
Status: DISCONTINUED | OUTPATIENT
Start: 2021-12-06 | End: 2021-12-06 | Stop reason: HOSPADM

## 2021-12-06 RX ORDER — METHYLPREDNISOLONE ACETATE 40 MG/ML
INJECTION, SUSPENSION INTRA-ARTICULAR; INTRALESIONAL; INTRAMUSCULAR; SOFT TISSUE
Status: DISCONTINUED | OUTPATIENT
Start: 2021-12-06 | End: 2021-12-06 | Stop reason: HOSPADM

## 2021-12-06 RX ADMIN — ALPRAZOLAM 1 MG: 0.5 TABLET, ORALLY DISINTEGRATING ORAL at 02:12

## 2022-01-03 ENCOUNTER — OFFICE VISIT (OUTPATIENT)
Dept: PAIN MEDICINE | Facility: CLINIC | Age: 32
End: 2022-01-03
Payer: OTHER GOVERNMENT

## 2022-01-03 ENCOUNTER — TELEPHONE (OUTPATIENT)
Dept: PAIN MEDICINE | Facility: CLINIC | Age: 32
End: 2022-01-03

## 2022-01-03 VITALS
OXYGEN SATURATION: 97 % | WEIGHT: 194.88 LBS | SYSTOLIC BLOOD PRESSURE: 110 MMHG | TEMPERATURE: 97 F | RESPIRATION RATE: 18 BRPM | DIASTOLIC BLOOD PRESSURE: 62 MMHG | BODY MASS INDEX: 30.52 KG/M2 | HEART RATE: 90 BPM

## 2022-01-03 DIAGNOSIS — Z01.818 PRE-OP TESTING: ICD-10-CM

## 2022-01-03 DIAGNOSIS — M54.12 CERVICAL RADICULOPATHY: ICD-10-CM

## 2022-01-03 DIAGNOSIS — M51.36 DDD (DEGENERATIVE DISC DISEASE), LUMBAR: ICD-10-CM

## 2022-01-03 DIAGNOSIS — M54.16 LUMBAR RADICULOPATHY: Primary | ICD-10-CM

## 2022-01-03 DIAGNOSIS — M50.30 DDD (DEGENERATIVE DISC DISEASE), CERVICAL: ICD-10-CM

## 2022-01-03 PROCEDURE — 99214 OFFICE O/P EST MOD 30 MIN: CPT | Mod: PBBFAC,PN | Performed by: PHYSICIAN ASSISTANT

## 2022-01-03 PROCEDURE — 99999 PR PBB SHADOW E&M-EST. PATIENT-LVL IV: ICD-10-PCS | Mod: PBBFAC,,, | Performed by: PHYSICIAN ASSISTANT

## 2022-01-03 PROCEDURE — 99214 OFFICE O/P EST MOD 30 MIN: CPT | Mod: S$PBB,CS,, | Performed by: PHYSICIAN ASSISTANT

## 2022-01-03 PROCEDURE — 99214 PR OFFICE/OUTPT VISIT, EST, LEVL IV, 30-39 MIN: ICD-10-PCS | Mod: S$PBB,CS,, | Performed by: PHYSICIAN ASSISTANT

## 2022-01-03 PROCEDURE — 99999 PR PBB SHADOW E&M-EST. PATIENT-LVL IV: CPT | Mod: PBBFAC,,, | Performed by: PHYSICIAN ASSISTANT

## 2022-01-03 RX ORDER — ALPRAZOLAM 0.5 MG/1
1 TABLET, ORALLY DISINTEGRATING ORAL ONCE AS NEEDED
Status: CANCELLED | OUTPATIENT
Start: 2022-01-21 | End: 2033-06-18

## 2022-01-03 NOTE — TELEPHONE ENCOUNTER
Spoke with patient and informed her that her TFESI will be done in the morning rather than the afternoon and she voiced understanding

## 2022-01-03 NOTE — PROGRESS NOTES
This note was completed with dictation software and grammatical errors may exist.    CC:  Neck pain, back pain    HPI:  The patient is a 31-year-old woman with a history of cervical DDD who presented in referral from [facility] for cervical radiculopathy.  She is status post left L5/S1 transforaminal epidural steroid injection on 12/06/2021 with additional relief for 3 weeks.  However, a week after Brock she used a rower for 10 minutes and for the next 4 days she had increased left leg pain.  This subsided some and she reports cleaning her house which caused increased pain as well.  The pain starts in the left low back radiates to the left buttock, posterior thigh and calf.  She has been participating in physical therapy and reports relief with dry needling.  Sometimes the physical therapy will increase her pain if she is already hurting worse than usual.  She also continues to have neck pain but not as bad as her low back and left leg.  Her neck pain is worse with driving in a car and she continues to have pins and needles in her right upper extremity.  She reports having 1 episode of right hand weakness and pain when playing video games with her son.  She denies having numbness or incontinence.    Prior history: She presents in follow-up for neck pain.  She was sent for direct procedure from to [facility].  She is status post C6/7 interlaminar ZAY on 05/31/2021 with 50% improvement, almost complete improvement of her right arm numbness and tingling however.  She reports that her neck pain began in March, 2021 when she was throwing a medicine ball over her head.  She had sudden pain in the neck, right shoulder and began having numbness and tingling in her right arm.  She reports that pain is aching, numb, sharp, shooting, tingling and deep.  She states that her pain was much worse when having to drive but also with running which she needs to do for her physical fitness test.  She does get  some relief with physical therapy, medications including ibuprofen, Mobic and stretching helps.  She does report having chronic migraine headaches, has not seen neurology for this.  In the last year because of her neck pain this seems to have exacerbated her headaches.    Her other complaint is back pain in the mid and low back for the last year.  Particularly in the low back, radiates out to the lateral hips, sometimes radiates along her posterior thighs into her calves.  This pain is worse with running, also worse with sitting too long and when moving from sitting to standing.  She denies any constant numbness, no bowel or bladder incontinence.    Pain intervention history: She is status post C6/7 interlaminar ZAY on 05/31/2021 with 50% improvement of neck pain, almost 90% improvement of right arm pain.She is status post L5/S1 ZAY on 08/26/2021 with No major benefit.   She is status post left L5/S1 transforaminal epidural steroid injection on 10/21/2021 with 50% relief.   She is status post left L5/S1 transforaminal epidural steroid injection on 12/06/2021 with additional relief for 3 weeks.     Spine surgeries:None    Antineuropathics:  Gabapentin 600 mg nightly  NSAIDs:  Mobic, ibuprofen both provide 30-50% relief.  Currently taking nabumetone 750 mg twice daily as needed  Physical therapy:  She has been going to physical therapy at [place] with some relief, working on both her neck and back  Antidepressants:  Muscle relaxers:  Tizanidine 2 mg nightly as needed  Opioids:  Antiplatelets/Anticoagulants:    ROS:  She reports fatigability, headaches and back pain.  Balance of review of systems is negative.    No results found for: LABA1C, HGBA1C    Lab Results   Component Value Date    WBC 8.91 06/01/2016    HGB 11.0 (L) 06/01/2016    HCT 32.5 (L) 06/01/2016    MCV 91 06/01/2016     06/01/2016             Past Medical History:   Diagnosis Date    Hyperlipidemia     Migraine     PCOS (polycystic ovarian  syndrome)        Past Surgical History:   Procedure Laterality Date    EPIDURAL STEROID INJECTION INTO CERVICAL SPINE N/A 5/31/2021    Procedure: Injection-steroid-epidural-cervical C6/7 spread to the right;  Surgeon: Jigar Gay MD;  Location: [facility] OR;  Service: Pain Management;  Laterality: N/A;    EPIDURAL STEROID INJECTION INTO LUMBAR SPINE N/A 8/26/2021    Procedure: Injection-steroid-epidural-lumbar l5/S1 interlaminar;  Surgeon: Jigar Gay MD;  Location: [facility] OR;  Service: Pain Management;  Laterality: N/A;    MOUTH SURGERY      TRANSFORAMINAL EPIDURAL INJECTION OF STEROID Left 10/21/2021    Procedure: Injection,steroid,epidural,transforaminal approach L5/S1;  Surgeon: Jigar Gay MD;  Location: [facility] OR;  Service: Pain Management;  Laterality: Left;    TRANSFORAMINAL EPIDURAL INJECTION OF STEROID Left 12/6/2021    Procedure: Injection,steroid,epidural,transforaminal approach L5/S1;  Surgeon: Jigar Gay MD;  Location: [facility] OR;  Service: Pain Management;  Laterality: Left;    VAGINAL DELIVERY      x3    WISDOM TOOTH EXTRACTION         Social History     Socioeconomic History    Marital status:    Tobacco Use    Smoking status: Never Smoker    Smokeless tobacco: Never Used   Substance and Sexual Activity    Alcohol use: No    Drug use: No    Sexual activity: Yes     Partners: Male         Medications/Allergies: See med card    Vitals:    01/03/22 0744   BP: 110/62   Pulse: 90   Resp: 18   Temp: 96.9 °F (36.1 °C)   TempSrc: Oral   SpO2: 97%   Weight: 88.4 kg (194 lb 14.2 oz)   PainSc:   4   PainLoc: Back         Physical exam:  Gen: A and O x3, pleasant, well-groomed  Skin: No rashes or obvious lesions  HEENT: PERRLA, no obvious deformities on ears or in canals.Trachea midline.  CVS: Regular rate and rhythm, normal palpable pulses.  Resp: Clear to auscultation bilaterally, no wheezes or rales.  Abdomen: Soft, NT/ND.  Musculoskeletal: Able to heel walk, toe  walk. No antalgic gait.     Neuro:  Upper extremities: 5/5 strength bilaterally, 4+/5 strength right    Lower extremities: 5/5 strength bilaterally  Reflexes: Brachioradialis 2+, Bicep 2+, Tricep 2+.  Patellar 2+, Achilles 2+ bilaterally.  Sensory: Intact and symmetrical to light touch and pinprick in C2-T1 dermatomes bilaterally. Intact and symmetrical to light touch and pinprick in L2-S1 dermatomes bilaterally.     Cervical Spine:  Cervical spine: ROM is full in flexion, extension and lateral rotation with increased pain on right oblique extension greater than left oblique extension.  Spurling's maneuver causes neck pain to either side.  Myofascial exam: No Tenderness to palpation across cervical paraspinous region bilaterally.    Lumbar spine:  Lumbar spine:  Range of motion is mildly reduced with both flexion and extension with increased pain on both maneuvers causing bilateral hip pain, low back pain    Frandy's test causes no increased pain on either side.    Supine straight leg raise is positive for left posterior thigh and calf pain.  Internal and external rotation of the hip causes no increased pain on either side.  Myofascial exam: No tenderness to palpation across lumbar paraspinous muscles.    Imagin21 MRI C-spine:  C2-C3: No disc herniation or significant posterior osseous ridging. No significant spinal canal or foraminal stenosis.   C3-C4: No disc herniation or significant posterior osseous ridging. No significant spinal canal or foraminal stenosis.   C4-C5: Mild disc osteophyte complex.  Slight ventral cord flattening with preserved ventral and dorsal CSF.  No significant foraminal stenosis.   C5-C6: Mild disc osteophyte complex.  Slight ventral cord flattening with preserved ventral and dorsal CSF.  No significant foraminal stenosis.   C6-C7: Mild disc osteophyte complex.  Preserved ventral and dorsal CSF.  No significant foraminal stenosis.   C7-T1: No disc herniation or significant  posterior osseous ridging. No significant spinal canal or foraminal stenosis.    8/6/21 MRI L-spine:  T12-L1: Unremarkable   L1-2: Unremarkable   L2-3: There is only a minimal disc bulge.  There is no spinal canal or significant foraminal stenosis.   L3-4: There is minimal bulging of the annulus.  There is also mild facet joint arthropathy.  There is flattening of the ventral dural sac.  There is borderline spinal stenosis.  The foramina are patent.   L4-5: There is a mild disc bulge slightly eccentric to the left.  There is mild facet joint arthropathy.  There is flattening of the ventral dural sac.  There is no spinal stenosis.  There is only mild bilateral foraminal stenosis without spinal stenosis.   L5-S1: There is mild disc space narrowing.  There is a mild disc bulge with superimposed broad left paracentral and proximal foraminal disc protrusion.  There is no spinal stenosis but there is crowding of the left lateral recess which could irritate the left S1 root.  Also, there is mild-to-moderate left foraminal stenosis.    Assessment:  The patient is a 31-year-old woman with a history of cervical DDD who presented in referral from [facility] for cervical radiculopathy.    1. Lumbar radiculopathy  Case Request Operating Room: Injection,steroid,epidural,transforaminal approach L5/S1   2. DDD (degenerative disc disease), lumbar     3. Cervical radiculopathy     4. DDD (degenerative disc disease), cervical     5. Pre-op testing  COVID-19 Routine Screening       Plan:  1. The patient did have additional benefit following the left L5/S1 transforaminal epidural steroid injection but had some worsening pain from exercising on a rower.  She notes that this or biking is the alternative physical fitness test for the  instead of running.  She states that she will ease back into this but for a shorter period of time to see how she does.  I am going to set her up to repeat a left L5/S1  transforaminal epidural steroid injection to see if she can get closer to full relief.  Lastly, if she does not have relief with conservative treatment she will follow up with Neurosurgery.  2. I encouraged her to continue physical therapy.  3. If her neck pain worsens we can repeat a cervical ZAY.  4. Follow-up in 4 weeks postprocedure or sooner as needed.

## 2022-01-18 ENCOUNTER — PATIENT MESSAGE (OUTPATIENT)
Dept: SURGERY | Facility: HOSPITAL | Age: 32
End: 2022-01-18
Payer: OTHER GOVERNMENT

## 2022-01-20 ENCOUNTER — PATIENT MESSAGE (OUTPATIENT)
Dept: SURGERY | Facility: HOSPITAL | Age: 32
End: 2022-01-20
Payer: OTHER GOVERNMENT

## 2022-02-02 ENCOUNTER — PATIENT MESSAGE (OUTPATIENT)
Dept: SURGERY | Facility: HOSPITAL | Age: 32
End: 2022-02-02
Payer: OTHER GOVERNMENT

## 2022-02-02 DIAGNOSIS — Z01.818 PRE-OP TESTING: ICD-10-CM

## 2022-02-18 ENCOUNTER — LAB VISIT (OUTPATIENT)
Dept: FAMILY MEDICINE | Facility: CLINIC | Age: 32
End: 2022-02-18
Payer: OTHER GOVERNMENT

## 2022-02-18 DIAGNOSIS — Z01.818 PRE-OP TESTING: ICD-10-CM

## 2022-02-18 PROCEDURE — U0003 INFECTIOUS AGENT DETECTION BY NUCLEIC ACID (DNA OR RNA); SEVERE ACUTE RESPIRATORY SYNDROME CORONAVIRUS 2 (SARS-COV-2) (CORONAVIRUS DISEASE [COVID-19]), AMPLIFIED PROBE TECHNIQUE, MAKING USE OF HIGH THROUGHPUT TECHNOLOGIES AS DESCRIBED BY CMS-2020-01-R: HCPCS | Performed by: ANESTHESIOLOGY

## 2022-02-18 PROCEDURE — U0005 INFEC AGEN DETEC AMPLI PROBE: HCPCS | Performed by: ANESTHESIOLOGY

## 2022-02-19 LAB — SARS-COV-2 RNA RESP QL NAA+PROBE: NOT DETECTED

## 2022-02-21 ENCOUNTER — HOSPITAL ENCOUNTER (OUTPATIENT)
Facility: HOSPITAL | Age: 32
Discharge: HOME | End: 2022-02-21
Attending: ANESTHESIOLOGY | Admitting: ANESTHESIOLOGY
Payer: OTHER GOVERNMENT

## 2022-02-21 ENCOUNTER — HOSPITAL ENCOUNTER (OUTPATIENT)
Dept: RADIOLOGY | Facility: HOSPITAL | Age: 32
Discharge: HOME | End: 2022-02-21
Attending: ANESTHESIOLOGY
Payer: OTHER GOVERNMENT

## 2022-02-21 DIAGNOSIS — M54.50 LOWER BACK PAIN: ICD-10-CM

## 2022-02-21 DIAGNOSIS — M54.16 LUMBAR RADICULOPATHY: ICD-10-CM

## 2022-02-21 PROBLEM — Z00.00 ANNUAL PHYSICAL EXAM: Status: ACTIVE | Noted: 2022-02-21

## 2022-02-21 LAB
B-HCG UR QL: NEGATIVE
CTP QC/QA: YES

## 2022-02-21 PROCEDURE — 76000 FLUOROSCOPY <1 HR PHYS/QHP: CPT | Mod: TC,PO

## 2022-02-21 PROCEDURE — 25000003 PHARM REV CODE 250: Mod: PO | Performed by: ANESTHESIOLOGY

## 2022-02-21 PROCEDURE — 64483 NJX AA&/STRD TFRM EPI L/S 1: CPT | Mod: LT,,, | Performed by: ANESTHESIOLOGY

## 2022-02-21 PROCEDURE — 64483 NJX AA&/STRD TFRM EPI L/S 1: CPT | Mod: PO | Performed by: ANESTHESIOLOGY

## 2022-02-21 PROCEDURE — 81025 URINE PREGNANCY TEST: CPT | Mod: PO | Performed by: ANESTHESIOLOGY

## 2022-02-21 PROCEDURE — 63600175 PHARM REV CODE 636 W HCPCS: Mod: PO | Performed by: ANESTHESIOLOGY

## 2022-02-21 PROCEDURE — 64483 PR EPIDURAL INJ, ANES/STEROID, TRANSFORAMINAL, LUMB/SACR, SNGL LEVL: ICD-10-PCS | Mod: LT,,, | Performed by: ANESTHESIOLOGY

## 2022-02-21 PROCEDURE — 25500020 PHARM REV CODE 255: Mod: PO | Performed by: ANESTHESIOLOGY

## 2022-02-21 RX ORDER — BUPIVACAINE HYDROCHLORIDE 2.5 MG/ML
INJECTION, SOLUTION EPIDURAL; INFILTRATION; INTRACAUDAL
Status: DISCONTINUED | OUTPATIENT
Start: 2022-02-21 | End: 2022-02-21 | Stop reason: HOSPADM

## 2022-02-21 RX ORDER — LIDOCAINE HYDROCHLORIDE 10 MG/ML
INJECTION, SOLUTION EPIDURAL; INFILTRATION; INTRACAUDAL; PERINEURAL
Status: DISCONTINUED | OUTPATIENT
Start: 2022-02-21 | End: 2022-02-21 | Stop reason: HOSPADM

## 2022-02-21 RX ORDER — METHYLPREDNISOLONE ACETATE 80 MG/ML
INJECTION, SUSPENSION INTRA-ARTICULAR; INTRALESIONAL; INTRAMUSCULAR; SOFT TISSUE
Status: DISCONTINUED | OUTPATIENT
Start: 2022-02-21 | End: 2022-02-21 | Stop reason: HOSPADM

## 2022-02-21 RX ORDER — ALPRAZOLAM 0.5 MG/1
1 TABLET, ORALLY DISINTEGRATING ORAL ONCE AS NEEDED
Status: COMPLETED | OUTPATIENT
Start: 2022-02-21 | End: 2022-02-21

## 2022-02-21 RX ADMIN — ALPRAZOLAM 1 MG: 0.5 TABLET, ORALLY DISINTEGRATING ORAL at 12:02

## 2022-02-21 NOTE — H&P
CC: Back pain    HPI: The patient is a 30yo woman with a history of lumbar radiculopathy here for left L5/S1 TFESI. There are no major changes in history and physical from 1/3/22.    Past Medical History:   Diagnosis Date    Hyperlipidemia     Migraine     PCOS (polycystic ovarian syndrome)        Past Surgical History:   Procedure Laterality Date    EPIDURAL STEROID INJECTION INTO CERVICAL SPINE N/A 5/31/2021    Procedure: Injection-steroid-epidural-cervical C6/7 spread to the right;  Surgeon: Jigar Gay MD;  Location: [facility] OR;  Service: Pain Management;  Laterality: N/A;    EPIDURAL STEROID INJECTION INTO LUMBAR SPINE N/A 8/26/2021    Procedure: Injection-steroid-epidural-lumbar l5/S1 interlaminar;  Surgeon: Jigar aGy MD;  Location: [facility] OR;  Service: Pain Management;  Laterality: N/A;    MOUTH SURGERY      TRANSFORAMINAL EPIDURAL INJECTION OF STEROID Left 10/21/2021    Procedure: Injection,steroid,epidural,transforaminal approach L5/S1;  Surgeon: Jigar Gay MD;  Location: [facility] OR;  Service: Pain Management;  Laterality: Left;    TRANSFORAMINAL EPIDURAL INJECTION OF STEROID Left 12/6/2021    Procedure: Injection,steroid,epidural,transforaminal approach L5/S1;  Surgeon: Jigar Gay MD;  Location: [facility] OR;  Service: Pain Management;  Laterality: Left;    VAGINAL DELIVERY      x3    WISDOM TOOTH EXTRACTION         Family History   Problem Relation Age of Onset    Cancer Father        Social History     Socioeconomic History    Marital status:    Tobacco Use    Smoking status: Never Smoker    Smokeless tobacco: Never Used   Substance and Sexual Activity    Alcohol use: No    Drug use: No    Sexual activity: Yes     Partners: Male       No current facility-administered medications for this encounter.       Review of patient's allergies indicates:   Allergen Reactions    No known drug allergies        Vitals:    02/17/22 1645 02/21/22 1245   BP:  (!) 112/59  "  Pulse:  90   Resp:  16   Temp:  98 °F (36.7 °C)   SpO2:  99%   Weight: 81.6 kg (180 lb)    Height: 5' 8" (1.727 m)        ASA 2, Mallampati 2    REVIEW OF SYSTEMS:     GENERAL: No weight loss, malaise or fevers.  HEENT:  No recent changes in vision or hearing  NECK: Negative for lumps, no difficulty with swallowing.  RESPIRATORY: Negative for cough, wheezing or shortness of breath, patient denies any recent URI.  CARDIOVASCULAR: Negative for chest pain, leg swelling or palpitations.  GI: Negative for abdominal discomfort, blood in stools or black stools or change in bowel habits.  MUSCULOSKELETAL: See HPI.  SKIN: Negative for lesions, rash, and itching.  PSYCH: No suicidal or homicidal ideations, no current mood disturbances.  HEMATOLOGY/LYMPHOLOGY: Negative for prolonged bleeding, bruising easily or swollen nodes. Patient is not currently taking any anti-coagulants  ENDO: No history of diabetes or thyroid dysfunction  NEURO: No history of syncope, paralysis, seizures or tremors.All other reviewed and negative other than HPI.    Physical exam:  Gen: A and O x3, pleasant, well-groomed  Skin: No rashes or obvious lesions  HEENT: PERRLA, no obvious deformities on ears or in canals. No thyroid masses, trachea midline, no palpable lymph nodes in neck, axilla.  CVS: Regular rate and rhythm, normal S1 and S2, no murmurs.  Resp: Clear to auscultation bilaterally.  Abdomen: Soft, NT/ND, normal bowel sounds present.  Musculoskeletal/Neuro: Moving all extremities    Assessment:  Lumbar radiculopathy  -     Case Request Operating Room: Injection,steroid,epidural,transforaminal approach L5/S1  -     Vital signs; Standing  -     Verify informed consent; Standing  -     Notify physician ; Standing  -     Notify physician ; Standing  -     Notify physician (specify); Standing  -     Diet NPO; Standing  -     Place in Outpatient; Standing  -     alprazolam ODT dissolvable tablet 1 mg    Other orders  -     IP VTE LOW RISK PATIENT; " Standing  -     Cancel: POCT Glucose, Hand-Held Device; Standing  -     POCT urine pregnancy; Standing

## 2022-02-21 NOTE — DISCHARGE INSTRUCTIONS
PAIN MANAGEMENT    Home care instructions   Apply ice pack to the injection site for 20 minute prior for the first 24 hours for soreness/discomfort at injection site   DO NOT USE HEAT FOR 24 HOURS   Keep site clean and dry for 24 hours, remove bandaid when desired   Do not drive until tomorrow  Take care when walking after a lumbar injection     STEROIDS OR RADIOFREQUENCY    May take 10-14 days for full effects  Avoid strenuous exercises for 2 days    Resume Aspirin, Plavix, or Coumadin the day after the procedure unless other wise instructed  Resume home medication as prescribed today      CALL PHYSICIAN FOR:  Severe increase in your usual pain or appearance of new pain  Prolonged or increasing weakness or numbness in the legs or arms  Fever greater then 100 degrees F..  Drainage from the incision site, redness, active bleeding or increased swelling at the injection site  Headache that increases when your head is upright and decreases when you lie flat    FOR EMERGENCIES:   Go directly to Emergency Department for Shortness of breath, chest pain, or problems breathing

## 2022-02-21 NOTE — OP NOTE
PROCEDURE DATE: 2/21/2022    PROCEDURE: Left L5/S1 transforaminal epidural steroid injection under fluoroscopy    DIAGNOSIS: Lumbar  Radiculopathy    Post op diagnosis: Same    PHYSICIAN: Jigar Gay MD    MEDICATIONS INJECTED:  Methylprednisolone 40mg (1ml) and 1ml 0.25% bupivicaine at each nerve root.     LOCAL ANESTHETIC INJECTED:  Lidocaine 1%. 4 ml per site.    SEDATION MEDICATIONS: none    ESTIMATED BLOOD LOSS:  none    COMPLICATIONS:  none    TECHNIQUE:   A time-out was taken to identify patient and procedure side prior to starting the procedure. The patient was placed in a prone position, prepped and draped in the usual sterile fashion using ChloraPrep and sterile towels.  The area to be injected was determined under fluoroscopic guidance in AP and oblique view.  Local anesthetic was given by raising a wheal and going down to the hub of a 25-gauge 1.5 inch needle.  In oblique view, a 5 inch 22-gauge bent-tip spinal needle was introduced towards 6 oclock position of the pedicle of each above named nerve root level.  The needle was walked medially then hinged into the neural foramen and position was confirmed in AP and lateral views.  Omnipaque contrast dye was injected to confirm appropriate placement and that there was no vascular uptake.  After negative aspiration for blood or CSF, the medication was then injected. This was performed at the left L5/S1 level(s). The patient tolerated the procedure well.    The patient was monitored after the procedure.  Patient was given post procedure and discharge instructions to follow at home. The patient was discharged in a stable condition.

## 2022-02-22 VITALS
HEIGHT: 68 IN | OXYGEN SATURATION: 97 % | WEIGHT: 180 LBS | DIASTOLIC BLOOD PRESSURE: 68 MMHG | BODY MASS INDEX: 27.28 KG/M2 | SYSTOLIC BLOOD PRESSURE: 113 MMHG | RESPIRATION RATE: 15 BRPM | TEMPERATURE: 97 F | HEART RATE: 84 BPM

## 2022-03-28 RX ORDER — METFORMIN HYDROCHLORIDE 500 MG/1
TABLET ORAL
Qty: 60 TABLET | OUTPATIENT
Start: 2022-03-28

## 2022-03-28 NOTE — TELEPHONE ENCOUNTER
This Rx Request does not qualify for assessment with the [facility]   Please review protocol details and the Care Due Message for extra clinical information    Reasons Rx Request may be deferred:  Labs/Vitals overdue  Labs/Vitals abnormal  Patient has been seen in the ED/Hospital since the last PCP visit  Pt due for OV with PCP    Note composed:1:39 PM 03/28/2022

## 2022-03-30 NOTE — PROGRESS NOTES
This note was completed with dictation software and grammatical errors may exist.    CC:  Neck pain, back pain    HPI:  The patient is a 32-year-old woman with a history of cervical DDD who presented in referral from [facility] for cervical radiculopathy.  She is status post left L5/S1 transforaminal epidural steroid injection on 02/21/2022 with about 50% relief.  She does state that her pain is more steady and she has less spikes in her pain than she did before.  It is more tolerable.  She has been biking, exercising, walking 2-3 miles at a time.  She does get some cramps in her left leg with walking and at night.  She does report some pins and needles intermittently in her left leg.  She was unable to tolerate gabapentin due to drowsiness in the mornings.  She denies weakness or numbness.    Prior history: She presents in follow-up for neck pain.  She was sent for direct procedure from to [facility].  She is status post C6/7 interlaminar ZAY on 05/31/2021 with 50% improvement, almost complete improvement of her right arm numbness and tingling however.  She reports that her neck pain began in March, 2021 when she was throwing a medicine ball over her head.  She had sudden pain in the neck, right shoulder and began having numbness and tingling in her right arm.  She reports that pain is aching, numb, sharp, shooting, tingling and deep.  She states that her pain was much worse when having to drive but also with running which she needs to do for her physical fitness test.  She does get some relief with physical therapy, medications including ibuprofen, Mobic and stretching helps.  She does report having chronic migraine headaches, has not seen neurology for this.  In the last year because of her neck pain this seems to have exacerbated her headaches.    Her other complaint is back pain in the mid and low back for the last year.  Particularly in the low back, radiates out to the lateral hips,  sometimes radiates along her posterior thighs into her calves.  This pain is worse with running, also worse with sitting too long and when moving from sitting to standing.  She denies any constant numbness, no bowel or bladder incontinence.    Pain intervention history: She is status post C6/7 interlaminar ZAY on 05/31/2021 with 50% improvement of neck pain, almost 90% improvement of right arm pain.She is status post L5/S1 ZAY on 08/26/2021 with No major benefit.   She is status post left L5/S1 transforaminal epidural steroid injection on 10/21/2021 with 50% relief.   She is status post left L5/S1 transforaminal epidural steroid injection on 12/06/2021 with additional relief for 3 weeks.   She is status post left L5/S1 transforaminal epidural steroid injection on 02/21/2022 with about 50% relief.      Spine surgeries:None    Antineuropathics:  Gabapentin 600 mg nightly  NSAIDs:  Mobic, ibuprofen both provide 30-50% relief.  Currently taking nabumetone 750 mg twice daily as needed  Physical therapy:  She has been going to physical therapy at [facility] with some relief, working on both her neck and back  Antidepressants:  Muscle relaxers:  Tizanidine 2 mg nightly as needed  Opioids:  Antiplatelets/Anticoagulants:    ROS:  She reports fatigability, headaches and back pain.  Balance of review of systems is negative.    No results found for: LABA1C, HGBA1C    Lab Results   Component Value Date    WBC 8.91 06/01/2016    HGB 11.0 (L) 06/01/2016    HCT 32.5 (L) 06/01/2016    MCV 91 06/01/2016     06/01/2016             Past Medical History:   Diagnosis Date    Hyperlipidemia     Migraine     PCOS (polycystic ovarian syndrome)        Past Surgical History:   Procedure Laterality Date    EPIDURAL STEROID INJECTION INTO CERVICAL SPINE N/A 5/31/2021    Procedure: Injection-steroid-epidural-cervical C6/7 spread to the right;  Surgeon: Jigar Gay MD;  Location: [facility] OR;  Service: Pain Management;  Laterality: N/A;  "   EPIDURAL STEROID INJECTION INTO LUMBAR SPINE N/A 8/26/2021    Procedure: Injection-steroid-epidural-lumbar l5/S1 interlaminar;  Surgeon: Jigar Gay MD;  Location: [facility] OR;  Service: Pain Management;  Laterality: N/A;    MOUTH SURGERY      TRANSFORAMINAL EPIDURAL INJECTION OF STEROID Left 10/21/2021    Procedure: Injection,steroid,epidural,transforaminal approach L5/S1;  Surgeon: Jigar Gay MD;  Location: [facility] OR;  Service: Pain Management;  Laterality: Left;    TRANSFORAMINAL EPIDURAL INJECTION OF STEROID Left 12/6/2021    Procedure: Injection,steroid,epidural,transforaminal approach L5/S1;  Surgeon: Jigar Gay MD;  Location: [facility] OR;  Service: Pain Management;  Laterality: Left;    TRANSFORAMINAL EPIDURAL INJECTION OF STEROID Left 2/21/2022    Procedure: Injection,steroid,epidural,transforaminal approach L5/S1;  Surgeon: Jigar Gay MD;  Location: [facility] OR;  Service: Pain Management;  Laterality: Left;    VAGINAL DELIVERY      x3    WISDOM TOOTH EXTRACTION         Social History     Socioeconomic History    Marital status:    Tobacco Use    Smoking status: Never Smoker    Smokeless tobacco: Never Used   Substance and Sexual Activity    Alcohol use: No    Drug use: No    Sexual activity: Yes     Partners: Male         Medications/Allergies: See med card    Vitals:    03/31/22 0821   BP: 131/83   Pulse: 96   Weight: 85.1 kg (187 lb 9.8 oz)   Height: 5' 9.6" (1.768 m)   PainSc:   6   PainLoc: Back         Physical exam:  Gen: A and O x3, pleasant, well-groomed  Skin: No rashes or obvious lesions  HEENT: PERRLA, no obvious deformities on ears or in canals.Trachea midline.  CVS: Regular rate and rhythm, normal palpable pulses.  Resp: Clear to auscultation bilaterally, no wheezes or rales.  Abdomen: Soft, NT/ND.  Musculoskeletal: Able to heel walk, toe walk. No antalgic gait.     Neuro:  Upper extremities: 5/5 strength bilaterally, 4+/5 strength right    Lower " extremities: 5/5 strength bilaterally  Reflexes: Brachioradialis 2+, Bicep 2+, Tricep 2+.  Patellar 2+, Achilles 2+ bilaterally.  Sensory: Intact and symmetrical to light touch and pinprick in C2-T1 dermatomes bilaterally. Intact and symmetrical to light touch and pinprick in L2-S1 dermatomes bilaterally.     Cervical Spine:  Cervical spine: ROM is full in flexion, extension and lateral rotation with increased pain on right oblique extension greater than left oblique extension.  Spurling's maneuver causes neck pain to either side.  Myofascial exam: No Tenderness to palpation across cervical paraspinous region bilaterally.    Lumbar spine:  Lumbar spine:  Range of motion is mildly reduced with both flexion and extension with increased pain on both maneuvers causing bilateral hip pain, low back pain    Frandy's test causes no increased pain on either side.    Supine straight leg raise is positive for left posterior thigh and calf pain.  Internal and external rotation of the hip causes no increased pain on either side.  Myofascial exam: No tenderness to palpation across lumbar paraspinous muscles.    Imagin21 MRI C-spine:  C2-C3: No disc herniation or significant posterior osseous ridging. No significant spinal canal or foraminal stenosis.   C3-C4: No disc herniation or significant posterior osseous ridging. No significant spinal canal or foraminal stenosis.   C4-C5: Mild disc osteophyte complex.  Slight ventral cord flattening with preserved ventral and dorsal CSF.  No significant foraminal stenosis.   C5-C6: Mild disc osteophyte complex.  Slight ventral cord flattening with preserved ventral and dorsal CSF.  No significant foraminal stenosis.   C6-C7: Mild disc osteophyte complex.  Preserved ventral and dorsal CSF.  No significant foraminal stenosis.   C7-T1: No disc herniation or significant posterior osseous ridging. No significant spinal canal or foraminal stenosis.    21 MRI L-spine:  T12-L1:  Unremarkable   L1-2: Unremarkable   L2-3: There is only a minimal disc bulge.  There is no spinal canal or significant foraminal stenosis.   L3-4: There is minimal bulging of the annulus.  There is also mild facet joint arthropathy.  There is flattening of the ventral dural sac.  There is borderline spinal stenosis.  The foramina are patent.   L4-5: There is a mild disc bulge slightly eccentric to the left.  There is mild facet joint arthropathy.  There is flattening of the ventral dural sac.  There is no spinal stenosis.  There is only mild bilateral foraminal stenosis without spinal stenosis.   L5-S1: There is mild disc space narrowing.  There is a mild disc bulge with superimposed broad left paracentral and proximal foraminal disc protrusion.  There is no spinal stenosis but there is crowding of the left lateral recess which could irritate the left S1 root.  Also, there is mild-to-moderate left foraminal stenosis.    Assessment:  The patient is a 32-year-old woman with a history of cervical DDD who presented in referral from [facility] for cervical radiculopathy.    1. Lumbar radiculopathy     2. DDD (degenerative disc disease), lumbar     3. Cervical radiculopathy     4. DDD (degenerative disc disease), cervical         Plan:  1. The patient did well following the left L5/S1 transforaminal epidural steroid injection.  Currently symptoms are tolerable and she is exercising regularly.  However, she is having some increased pain at night and difficulty sleeping soft and have her increase tizanidine to 4 mg nightly if tolerated.  2. Follow-up in 3 months or sooner as needed.

## 2022-03-31 ENCOUNTER — OFFICE VISIT (OUTPATIENT)
Dept: PAIN MEDICINE | Facility: CLINIC | Age: 32
End: 2022-03-31
Payer: OTHER GOVERNMENT

## 2022-03-31 VITALS
SYSTOLIC BLOOD PRESSURE: 131 MMHG | DIASTOLIC BLOOD PRESSURE: 83 MMHG | BODY MASS INDEX: 26.86 KG/M2 | HEART RATE: 96 BPM | HEIGHT: 70 IN | WEIGHT: 187.63 LBS

## 2022-03-31 DIAGNOSIS — M51.36 DDD (DEGENERATIVE DISC DISEASE), LUMBAR: ICD-10-CM

## 2022-03-31 DIAGNOSIS — M54.12 CERVICAL RADICULOPATHY: ICD-10-CM

## 2022-03-31 DIAGNOSIS — M54.16 LUMBAR RADICULOPATHY: Primary | ICD-10-CM

## 2022-03-31 DIAGNOSIS — M50.30 DDD (DEGENERATIVE DISC DISEASE), CERVICAL: ICD-10-CM

## 2022-03-31 PROCEDURE — 99213 OFFICE O/P EST LOW 20 MIN: CPT | Mod: PBBFAC,PN | Performed by: PHYSICIAN ASSISTANT

## 2022-03-31 PROCEDURE — 99213 OFFICE O/P EST LOW 20 MIN: CPT | Mod: S$PBB,,, | Performed by: PHYSICIAN ASSISTANT

## 2022-03-31 PROCEDURE — 99999 PR PBB SHADOW E&M-EST. PATIENT-LVL III: CPT | Mod: PBBFAC,,, | Performed by: PHYSICIAN ASSISTANT

## 2022-03-31 PROCEDURE — 99213 PR OFFICE/OUTPT VISIT, EST, LEVL III, 20-29 MIN: ICD-10-PCS | Mod: S$PBB,,, | Performed by: PHYSICIAN ASSISTANT

## 2022-03-31 PROCEDURE — 99999 PR PBB SHADOW E&M-EST. PATIENT-LVL III: ICD-10-PCS | Mod: PBBFAC,,, | Performed by: PHYSICIAN ASSISTANT

## 2022-03-31 RX ORDER — TIZANIDINE 2 MG/1
TABLET ORAL
Qty: 60 TABLET | Refills: 6 | Status: SHIPPED | OUTPATIENT
Start: 2022-03-31 | End: 2022-07-28

## 2022-05-23 PROBLEM — Z00.00 ANNUAL PHYSICAL EXAM: Status: RESOLVED | Noted: 2022-02-21 | Resolved: 2022-05-23

## 2022-08-15 ENCOUNTER — OFFICE VISIT (OUTPATIENT)
Dept: PAIN MEDICINE | Facility: CLINIC | Age: 32
End: 2022-08-15
Payer: OTHER GOVERNMENT

## 2022-08-15 VITALS
HEIGHT: 70 IN | DIASTOLIC BLOOD PRESSURE: 70 MMHG | SYSTOLIC BLOOD PRESSURE: 113 MMHG | WEIGHT: 192.56 LBS | BODY MASS INDEX: 27.57 KG/M2 | HEART RATE: 90 BPM | OXYGEN SATURATION: 97 %

## 2022-08-15 DIAGNOSIS — M54.16 BILATERAL LUMBAR RADICULOPATHY: Primary | ICD-10-CM

## 2022-08-15 DIAGNOSIS — M51.36 DDD (DEGENERATIVE DISC DISEASE), LUMBAR: ICD-10-CM

## 2022-08-15 PROCEDURE — 99999 PR PBB SHADOW E&M-EST. PATIENT-LVL III: CPT | Mod: PBBFAC,,, | Performed by: ANESTHESIOLOGY

## 2022-08-15 PROCEDURE — 99213 PR OFFICE/OUTPT VISIT, EST, LEVL III, 20-29 MIN: ICD-10-PCS | Mod: S$PBB,,, | Performed by: ANESTHESIOLOGY

## 2022-08-15 PROCEDURE — 99213 OFFICE O/P EST LOW 20 MIN: CPT | Mod: S$PBB,,, | Performed by: ANESTHESIOLOGY

## 2022-08-15 PROCEDURE — 99213 OFFICE O/P EST LOW 20 MIN: CPT | Mod: 25,PBBFAC,PN | Performed by: ANESTHESIOLOGY

## 2022-08-15 PROCEDURE — 96372 THER/PROPH/DIAG INJ SC/IM: CPT | Mod: PBBFAC,PN

## 2022-08-15 PROCEDURE — 99999 PR PBB SHADOW E&M-EST. PATIENT-LVL III: ICD-10-PCS | Mod: PBBFAC,,, | Performed by: ANESTHESIOLOGY

## 2022-08-15 RX ORDER — KETOROLAC TROMETHAMINE 30 MG/ML
30 INJECTION, SOLUTION INTRAMUSCULAR; INTRAVENOUS ONCE
Status: DISCONTINUED | OUTPATIENT
Start: 2022-08-15 | End: 2022-08-15

## 2022-08-15 RX ORDER — KETOROLAC TROMETHAMINE 30 MG/ML
60 INJECTION, SOLUTION INTRAMUSCULAR; INTRAVENOUS ONCE
Status: DISCONTINUED | OUTPATIENT
Start: 2022-08-15 | End: 2022-08-15

## 2022-08-15 RX ORDER — KETOROLAC TROMETHAMINE 30 MG/ML
30 INJECTION, SOLUTION INTRAMUSCULAR; INTRAVENOUS ONCE
Status: CANCELLED | OUTPATIENT
Start: 2022-08-15 | End: 2022-08-15

## 2022-08-15 RX ORDER — KETOROLAC TROMETHAMINE 30 MG/ML
30 INJECTION, SOLUTION INTRAMUSCULAR; INTRAVENOUS ONCE
Status: COMPLETED | OUTPATIENT
Start: 2022-08-15 | End: 2022-08-15

## 2022-08-15 RX ORDER — METHYLPREDNISOLONE 4 MG/1
TABLET ORAL
Qty: 21 EACH | Refills: 0 | Status: SHIPPED | OUTPATIENT
Start: 2022-08-15 | End: 2022-08-26

## 2022-08-15 RX ADMIN — KETOROLAC TROMETHAMINE 30 MG: 30 INJECTION, SOLUTION INTRAMUSCULAR; INTRAVENOUS at 10:08

## 2022-08-15 NOTE — PROGRESS NOTES
This note was completed with dictation software and grammatical errors may exist.    CC:  Neck pain, back pain    HPI:  The patient is a 32-year-old woman with a history of cervical DDD who presented in referral from [facility] for cervical radiculopathy.  The patient returns in follow-up today complaining of back pain.  She had actually been doing fairly well over the last 6 months, having some occasional pain in the back and into the left leg.  Nonetheless, she has been exercising by walking 3 days a week, no longer running.  She states that about a week ago she developed pain suddenly, no specific trauma that may have caused it but she did feel a twinge when she turned.  States that the pain is across both sides of her back, radiating into both legs.  Is worse with standing too long or sitting too long.  She states that it is fairly intense and she is having difficulty focusing or doing any type of work when she is standing.  She denies any aravind weakness, denies any bowel or bladder incontinence.        Prior history: She presents in follow-up for neck pain.  She was sent for direct procedure from to [facility].  She is status post C6/7 interlaminar ZAY on 05/31/2021 with 50% improvement, almost complete improvement of her right arm numbness and tingling however.  She reports that her neck pain began in March, 2021 when she was throwing a medicine ball over her head.  She had sudden pain in the neck, right shoulder and began having numbness and tingling in her right arm.  She reports that pain is aching, numb, sharp, shooting, tingling and deep.  She states that her pain was much worse when having to drive but also with running which she needs to do for her physical fitness test.  She does get some relief with physical therapy, medications including ibuprofen, Mobic and stretching helps.  She does report having chronic migraine headaches, has not seen neurology for this.  In the last year  because of her neck pain this seems to have exacerbated her headaches.    Her other complaint is back pain in the mid and low back for the last year.  Particularly in the low back, radiates out to the lateral hips, sometimes radiates along her posterior thighs into her calves.  This pain is worse with running, also worse with sitting too long and when moving from sitting to standing.  She denies any constant numbness, no bowel or bladder incontinence.    Pain intervention history: She is status post C6/7 interlaminar ZAY on 05/31/2021 with 50% improvement of neck pain, almost 90% improvement of right arm pain.She is status post L5/S1 ZAY on 08/26/2021 with No major benefit.   She is status post left L5/S1 transforaminal epidural steroid injection on 10/21/2021 with 50% relief.   She is status post left L5/S1 transforaminal epidural steroid injection on 12/06/2021 with additional relief for 3 weeks.   She is status post left L5/S1 transforaminal epidural steroid injection on 02/21/2022 with about 50% relief.      Spine surgeries:None    Antineuropathics:  Gabapentin 600 mg nightly  NSAIDs:  Mobic, ibuprofen both provide 30-50% relief.  Currently taking nabumetone 750 mg twice daily as needed  Physical therapy:  She has been going to physical therapy at [place] with some relief, working on both her neck and back  Antidepressants:  Muscle relaxers:  Tizanidine 2 mg nightly as needed  Opioids:  Antiplatelets/Anticoagulants:    ROS:  She reports fatigability, headaches and back pain.  Balance of review of systems is negative.    No results found for: LABA1C, HGBA1C    Lab Results   Component Value Date    WBC 8.91 06/01/2016    HGB 11.0 (L) 06/01/2016    HCT 32.5 (L) 06/01/2016    MCV 91 06/01/2016     06/01/2016             Past Medical History:   Diagnosis Date    Hyperlipidemia     Migraine     PCOS (polycystic ovarian syndrome)        Past Surgical History:   Procedure Laterality Date    EPIDURAL STEROID  "INJECTION INTO CERVICAL SPINE N/A 5/31/2021    Procedure: Injection-steroid-epidural-cervical C6/7 spread to the right;  Surgeon: Jigar Gay MD;  Location: [facility] OR;  Service: Pain Management;  Laterality: N/A;    EPIDURAL STEROID INJECTION INTO LUMBAR SPINE N/A 8/26/2021    Procedure: Injection-steroid-epidural-lumbar l5/S1 interlaminar;  Surgeon: Jigar Gay MD;  Location: [facility] OR;  Service: Pain Management;  Laterality: N/A;    MOUTH SURGERY      TRANSFORAMINAL EPIDURAL INJECTION OF STEROID Left 10/21/2021    Procedure: Injection,steroid,epidural,transforaminal approach L5/S1;  Surgeon: Jigar Gay MD;  Location: [facility] OR;  Service: Pain Management;  Laterality: Left;    TRANSFORAMINAL EPIDURAL INJECTION OF STEROID Left 12/6/2021    Procedure: Injection,steroid,epidural,transforaminal approach L5/S1;  Surgeon: Jigar Gay MD;  Location: [facility] OR;  Service: Pain Management;  Laterality: Left;    TRANSFORAMINAL EPIDURAL INJECTION OF STEROID Left 2/21/2022    Procedure: Injection,steroid,epidural,transforaminal approach L5/S1;  Surgeon: Jigar Gay MD;  Location: [facility] OR;  Service: Pain Management;  Laterality: Left;    VAGINAL DELIVERY      x3    WISDOM TOOTH EXTRACTION         Social History     Socioeconomic History    Marital status:    Tobacco Use    Smoking status: Never Smoker    Smokeless tobacco: Never Used   Substance and Sexual Activity    Alcohol use: No    Drug use: No    Sexual activity: Yes     Partners: Male         Medications/Allergies: See med card    Vitals:    08/15/22 0944   BP: 113/70   Pulse: 90   SpO2: 97%   Weight: 87.4 kg (192 lb 9.2 oz)   Height: 5' 9.6" (1.768 m)   PainSc:   6   PainLoc: Back         Physical exam:    Gen: A and O x3, pleasant, well-groomed  Skin: No rashes or obvious lesions  HEENT: PERRLA, no obvious deformities on ears or in canals. Trachea midline.  CVS: Regular rate and rhythm, normal palpable " pulses.  Resp:No increased work of breathing, symmetrical chest rise.  Abdomen: Soft, NT/ND.  Musculoskeletal:No antalgic gait.         Neuro:    Iliopsoas Quadriceps Knee  Flexion Tibialis  anterior Gastro- cnemius EHL   Lower: R 5/5 5/5 5/5 5/5 5/5 5/5    L 5/5 5/5 5/5 5/5 5/5 5/5      Left  Right    Patellar DTR 2+ 2+   Achilles DTR 1+ 1+   Munoz Absent  Absent   Clonus Absent Absent   Babinski Absent Absent     Intact and symmetrical to light touch and pinprick in L1-S1 dermatomes bilaterally.    Lumbar spine:  Lumbar spine:  Range of motion is moderately decreased with both flexion and extension with increased pain.  Frandy's test causes no increased pain on either side.    Supine straight leg raise is negative bilaterally.    Internal and external rotation of the hip causes no increased pain on either side.  Myofascial exam: No tenderness to palpation across lumbar paraspinous muscles.      Imagin21 MRI C-spine:  C2-C3: No disc herniation or significant posterior osseous ridging. No significant spinal canal or foraminal stenosis.   C3-C4: No disc herniation or significant posterior osseous ridging. No significant spinal canal or foraminal stenosis.   C4-C5: Mild disc osteophyte complex.  Slight ventral cord flattening with preserved ventral and dorsal CSF.  No significant foraminal stenosis.   C5-C6: Mild disc osteophyte complex.  Slight ventral cord flattening with preserved ventral and dorsal CSF.  No significant foraminal stenosis.   C6-C7: Mild disc osteophyte complex.  Preserved ventral and dorsal CSF.  No significant foraminal stenosis.   C7-T1: No disc herniation or significant posterior osseous ridging. No significant spinal canal or foraminal stenosis.    21 MRI L-spine:  T12-L1: Unremarkable   L1-2: Unremarkable   L2-3: There is only a minimal disc bulge.  There is no spinal canal or significant foraminal stenosis.   L3-4: There is minimal bulging of the annulus.  There is also mild  facet joint arthropathy.  There is flattening of the ventral dural sac.  There is borderline spinal stenosis.  The foramina are patent.   L4-5: There is a mild disc bulge slightly eccentric to the left.  There is mild facet joint arthropathy.  There is flattening of the ventral dural sac.  There is no spinal stenosis.  There is only mild bilateral foraminal stenosis without spinal stenosis.   L5-S1: There is mild disc space narrowing.  There is a mild disc bulge with superimposed broad left paracentral and proximal foraminal disc protrusion.  There is no spinal stenosis but there is crowding of the left lateral recess which could irritate the left S1 root.  Also, there is mild-to-moderate left foraminal stenosis.    Assessment:  The patient is a 32-year-old woman with a history of cervical DDD who presented in referral from [facility] for cervical radiculopathy.    1. Bilateral lumbar radiculopathy     2. DDD (degenerative disc disease), lumbar         Plan:  1. We discussed that she is having a flare up of pain but since it has been fairly recent, we will start with a Medrol Dosepak and we will provide a Toradol injection today.  We have administered 60 mg.  I will have her let me know over the next week if things are improving, if not I will set her up for an epidural steroid injection.

## 2022-08-16 RX ORDER — KETOROLAC TROMETHAMINE 30 MG/ML
30 INJECTION, SOLUTION INTRAMUSCULAR; INTRAVENOUS ONCE
Status: COMPLETED | OUTPATIENT
Start: 2022-08-16 | End: 2022-08-15

## 2022-08-18 ENCOUNTER — PATIENT MESSAGE (OUTPATIENT)
Dept: PAIN MEDICINE | Facility: CLINIC | Age: 32
End: 2022-08-18
Payer: OTHER GOVERNMENT

## 2022-08-26 RX ORDER — PREGABALIN 50 MG/1
50 CAPSULE ORAL NIGHTLY
Qty: 30 CAPSULE | Refills: 2 | Status: SHIPPED | OUTPATIENT
Start: 2022-08-26 | End: 2023-01-01 | Stop reason: SDUPTHER

## 2022-09-30 ENCOUNTER — PATIENT MESSAGE (OUTPATIENT)
Dept: PAIN MEDICINE | Facility: CLINIC | Age: 32
End: 2022-09-30
Payer: OTHER GOVERNMENT

## 2022-09-30 NOTE — LETTER
October 11, 2022      [facility]  [address]  [place] [address]  Phone: [phone]  Fax: [phone]       Patient: Phill May   YOB: 1990  Date of Visit: 10/11/2022    To Whom It May Concern:    It is my medical opinion that Phill May be allowed to use an alternative cardiovascular fitness test instead of running or rowing maching as these can worsen issues related to her lumbar degenerative disc disease. Please allow an alternative such as cycling or elliptical machine as the cardiovascular test. Also, I do not recommend dead lifts as this will also worsen the lumbar degenerative issues.        If you have any questions or concerns, or if I can be of further assistance, please do not hesitate to contact me.    Sincerely,    Jigar Gay MD

## 2022-10-05 NOTE — TELEPHONE ENCOUNTER
Please let her know that I would recommend trying to take the Lyrica slightly earlier in the evening as long as it does not cause too much drowsiness and maybe she will not have the same effects in the morning.  As far as the letter, which she like this to be the same as the letter from last year, (see the letter from 07/19/2021).  In terms of her neck, depending on whether this is radiating into the arm or not, we could against set her up with physical therapy or an injection can always be considered

## 2022-10-11 ENCOUNTER — PATIENT MESSAGE (OUTPATIENT)
Dept: PAIN MEDICINE | Facility: CLINIC | Age: 32
End: 2022-10-11
Payer: OTHER GOVERNMENT

## 2022-10-31 ENCOUNTER — PATIENT MESSAGE (OUTPATIENT)
Dept: PAIN MEDICINE | Facility: CLINIC | Age: 32
End: 2022-10-31
Payer: OTHER GOVERNMENT

## 2022-11-02 DIAGNOSIS — M54.16 LUMBAR RADICULOPATHY: Primary | ICD-10-CM

## 2022-11-02 RX ORDER — ALPRAZOLAM 0.5 MG/1
1 TABLET, ORALLY DISINTEGRATING ORAL ONCE AS NEEDED
Status: CANCELLED | OUTPATIENT
Start: 2022-11-18 | End: 2034-04-15

## 2022-11-02 NOTE — TELEPHONE ENCOUNTER
Type of Procedure/Injection - Lumbar Transforaminal Epidural  L5/S1           Laterality - Bilateral      Type of Sedation - RNIV      Need to hold medication - yes      NSAIDs for 2 days      Clearance needed - no      Follow up - 3 week

## 2022-11-18 ENCOUNTER — HOSPITAL ENCOUNTER (OUTPATIENT)
Dept: RADIOLOGY | Facility: HOSPITAL | Age: 32
Discharge: HOME | End: 2022-11-18
Attending: ANESTHESIOLOGY | Admitting: ANESTHESIOLOGY
Payer: OTHER GOVERNMENT

## 2022-11-18 ENCOUNTER — HOSPITAL ENCOUNTER (OUTPATIENT)
Facility: HOSPITAL | Age: 32
Discharge: HOME | End: 2022-11-18
Attending: ANESTHESIOLOGY | Admitting: ANESTHESIOLOGY
Payer: OTHER GOVERNMENT

## 2022-11-18 VITALS
WEIGHT: 188 LBS | RESPIRATION RATE: 16 BRPM | DIASTOLIC BLOOD PRESSURE: 58 MMHG | HEART RATE: 69 BPM | SYSTOLIC BLOOD PRESSURE: 101 MMHG | HEIGHT: 70 IN | TEMPERATURE: 98 F | OXYGEN SATURATION: 100 % | BODY MASS INDEX: 26.92 KG/M2

## 2022-11-18 DIAGNOSIS — M54.50 LOWER BACK PAIN: ICD-10-CM

## 2022-11-18 DIAGNOSIS — M54.16 LUMBAR RADICULOPATHY: ICD-10-CM

## 2022-11-18 LAB
B-HCG UR QL: NEGATIVE
CTP QC/QA: YES

## 2022-11-18 PROCEDURE — 76000 FLUOROSCOPY <1 HR PHYS/QHP: CPT | Mod: TC,PO

## 2022-11-18 PROCEDURE — 62323 NJX INTERLAMINAR LMBR/SAC: CPT | Mod: ,,, | Performed by: ANESTHESIOLOGY

## 2022-11-18 PROCEDURE — 62323 PR INJ LUMBAR/SACRAL, W/IMAGING GUIDANCE: ICD-10-PCS | Mod: ,,, | Performed by: ANESTHESIOLOGY

## 2022-11-18 PROCEDURE — 62323 NJX INTERLAMINAR LMBR/SAC: CPT | Mod: PO | Performed by: ANESTHESIOLOGY

## 2022-11-18 PROCEDURE — 25000003 PHARM REV CODE 250: Mod: PO | Performed by: ANESTHESIOLOGY

## 2022-11-18 PROCEDURE — 25500020 PHARM REV CODE 255: Mod: PO | Performed by: ANESTHESIOLOGY

## 2022-11-18 PROCEDURE — 63600175 PHARM REV CODE 636 W HCPCS: Mod: PO | Performed by: ANESTHESIOLOGY

## 2022-11-18 PROCEDURE — A4216 STERILE WATER/SALINE, 10 ML: HCPCS | Mod: PO | Performed by: ANESTHESIOLOGY

## 2022-11-18 PROCEDURE — 81025 URINE PREGNANCY TEST: CPT | Mod: PO | Performed by: ANESTHESIOLOGY

## 2022-11-18 RX ORDER — LIDOCAINE HYDROCHLORIDE 10 MG/ML
INJECTION, SOLUTION EPIDURAL; INFILTRATION; INTRACAUDAL; PERINEURAL
Status: DISCONTINUED | OUTPATIENT
Start: 2022-11-18 | End: 2022-11-18 | Stop reason: HOSPADM

## 2022-11-18 RX ORDER — ALPRAZOLAM 0.5 MG/1
1 TABLET, ORALLY DISINTEGRATING ORAL ONCE AS NEEDED
Status: COMPLETED | OUTPATIENT
Start: 2022-11-18 | End: 2022-11-18

## 2022-11-18 RX ORDER — METHYLPREDNISOLONE ACETATE 80 MG/ML
INJECTION, SUSPENSION INTRA-ARTICULAR; INTRALESIONAL; INTRAMUSCULAR; SOFT TISSUE
Status: DISCONTINUED | OUTPATIENT
Start: 2022-11-18 | End: 2022-11-18 | Stop reason: HOSPADM

## 2022-11-18 RX ORDER — SODIUM CHLORIDE 9 MG/ML
INJECTION, SOLUTION INTRAMUSCULAR; INTRAVENOUS; SUBCUTANEOUS
Status: DISCONTINUED | OUTPATIENT
Start: 2022-11-18 | End: 2022-11-18 | Stop reason: HOSPADM

## 2022-11-18 RX ADMIN — ALPRAZOLAM 1 MG: 0.5 TABLET, ORALLY DISINTEGRATING ORAL at 08:11

## 2022-11-18 NOTE — DISCHARGE INSTRUCTIONS

## 2022-11-18 NOTE — OP NOTE

## 2022-11-18 NOTE — H&P
CC: Back pain    HPI: The patient is a 33yo woman with a history of lumbar radiculopathy here for L5/S1 ZAY. There are no major changes in history and physical from 8/15/22.    Past Medical History:   Diagnosis Date    Hyperlipidemia     Migraine     PCOS (polycystic ovarian syndrome)        Past Surgical History:   Procedure Laterality Date    EPIDURAL STEROID INJECTION INTO CERVICAL SPINE N/A 5/31/2021    Procedure: Injection-steroid-epidural-cervical C6/7 spread to the right;  Surgeon: Jigar Gay MD;  Location: [facility] OR;  Service: Pain Management;  Laterality: N/A;    EPIDURAL STEROID INJECTION INTO LUMBAR SPINE N/A 8/26/2021    Procedure: Injection-steroid-epidural-lumbar l5/S1 interlaminar;  Surgeon: Jigar Gay MD;  Location: [facility] OR;  Service: Pain Management;  Laterality: N/A;    MOUTH SURGERY      TRANSFORAMINAL EPIDURAL INJECTION OF STEROID Left 10/21/2021    Procedure: Injection,steroid,epidural,transforaminal approach L5/S1;  Surgeon: Jigar Gay MD;  Location: [facility] OR;  Service: Pain Management;  Laterality: Left;    TRANSFORAMINAL EPIDURAL INJECTION OF STEROID Left 12/6/2021    Procedure: Injection,steroid,epidural,transforaminal approach L5/S1;  Surgeon: Jigar Gay MD;  Location: [facility] OR;  Service: Pain Management;  Laterality: Left;    TRANSFORAMINAL EPIDURAL INJECTION OF STEROID Left 2/21/2022    Procedure: Injection,steroid,epidural,transforaminal approach L5/S1;  Surgeon: Jigar Gay MD;  Location: [facility] OR;  Service: Pain Management;  Laterality: Left;    VAGINAL DELIVERY      x3    WISDOM TOOTH EXTRACTION         Family History   Problem Relation Age of Onset    Cancer Father        Social History     Socioeconomic History    Marital status:    Tobacco Use    Smoking status: Never    Smokeless tobacco: Never   Substance and Sexual Activity    Alcohol use: No    Drug use: No    Sexual activity: Yes     Partners: Male       No current  "facility-administered medications for this encounter.       Review of patient's allergies indicates:   Allergen Reactions    No known drug allergies        Vitals:    11/16/22 1319 11/18/22 0840   BP:  111/61   Pulse:  68   Resp:  16   Temp:  97.5 °F (36.4 °C)   TempSrc:  Skin   SpO2:  98%   Weight: 85.3 kg (188 lb)    Height: 5' 9.6" (1.768 m)        ASA 2, Mallampati 2    REVIEW OF SYSTEMS:     GENERAL: No weight loss, malaise or fevers.  HEENT:  No recent changes in vision or hearing  NECK: Negative for lumps, no difficulty with swallowing.  RESPIRATORY: Negative for cough, wheezing or shortness of breath, patient denies any recent URI.  CARDIOVASCULAR: Negative for chest pain, leg swelling or palpitations.  GI: Negative for abdominal discomfort, blood in stools or black stools or change in bowel habits.  MUSCULOSKELETAL: See HPI.  SKIN: Negative for lesions, rash, and itching.  PSYCH: No suicidal or homicidal ideations, no current mood disturbances.  HEMATOLOGY/LYMPHOLOGY: Negative for prolonged bleeding, bruising easily or swollen nodes. Patient is not currently taking any anti-coagulants  ENDO: No history of diabetes or thyroid dysfunction  NEURO: No history of syncope, paralysis, seizures or tremors.All other reviewed and negative other than HPI.    Physical exam:  Gen: A and O x3, pleasant, well-groomed  Skin: No rashes or obvious lesions  HEENT: PERRLA, no obvious deformities on ears or in canals. No thyroid masses, trachea midline, no palpable lymph nodes in neck, axilla.  CVS: Regular rate and rhythm, normal S1 and S2, no murmurs.  Resp: Clear to auscultation bilaterally.  Abdomen: Soft, NT/ND, normal bowel sounds present.  Musculoskeletal/Neuro: Moving all extremities    Assessment:  Lumbar radiculopathy  -     Case Request Operating Room: Injection-steroid-epidural-lumbar L5/S1  -     Vital signs; Standing  -     Verify informed consent; Standing  -     Notify physician ; Standing  -     Notify " physician ; Standing  -     Notify physician (specify); Standing  -     Diet NPO; Standing  -     Place in Outpatient; Standing  -     alprazolam ODT dissolvable tablet 1 mg    Other orders  -     IP VTE LOW RISK PATIENT; Standing  -     POCT urine pregnancy; Standing

## 2022-11-18 NOTE — DISCHARGE SUMMARY
[facility] - Surgery  Discharge Note  Short Stay    Procedure(s) (LRB):  Injection-steroid-epidural-lumbar L5/S1 (N/A)      OUTCOME: Patient tolerated treatment/procedure well without complication and is now ready for discharge.    DISPOSITION: Home or Self Care    FINAL DIAGNOSIS:  Lumbar radiculopathy    FOLLOWUP: In clinic    DISCHARGE INSTRUCTIONS:    Discharge Procedure Orders   Diet Adult Regular     No dressing needed     Notify your health care provider if you experience any of the following:  temperature >100.4     Activity as tolerated

## 2022-12-12 ENCOUNTER — OFFICE VISIT (OUTPATIENT)
Dept: PAIN MEDICINE | Facility: CLINIC | Age: 32
End: 2022-12-12
Payer: OTHER GOVERNMENT

## 2022-12-12 VITALS
WEIGHT: 186.06 LBS | HEART RATE: 67 BPM | HEIGHT: 70 IN | SYSTOLIC BLOOD PRESSURE: 127 MMHG | OXYGEN SATURATION: 98 % | DIASTOLIC BLOOD PRESSURE: 73 MMHG | BODY MASS INDEX: 26.64 KG/M2

## 2022-12-12 DIAGNOSIS — M50.30 DDD (DEGENERATIVE DISC DISEASE), CERVICAL: ICD-10-CM

## 2022-12-12 DIAGNOSIS — M47.816 LUMBAR SPONDYLOSIS: ICD-10-CM

## 2022-12-12 DIAGNOSIS — M54.16 LUMBAR RADICULOPATHY: ICD-10-CM

## 2022-12-12 DIAGNOSIS — M51.36 DDD (DEGENERATIVE DISC DISEASE), LUMBAR: Primary | ICD-10-CM

## 2022-12-12 PROCEDURE — 99999 PR PBB SHADOW E&M-EST. PATIENT-LVL III: CPT | Mod: PBBFAC,,, | Performed by: PHYSICIAN ASSISTANT

## 2022-12-12 PROCEDURE — 99213 PR OFFICE/OUTPT VISIT, EST, LEVL III, 20-29 MIN: ICD-10-PCS | Mod: S$PBB,,, | Performed by: PHYSICIAN ASSISTANT

## 2022-12-12 PROCEDURE — 99213 OFFICE O/P EST LOW 20 MIN: CPT | Mod: S$PBB,,, | Performed by: PHYSICIAN ASSISTANT

## 2022-12-12 PROCEDURE — 99213 OFFICE O/P EST LOW 20 MIN: CPT | Mod: PBBFAC,PN | Performed by: PHYSICIAN ASSISTANT

## 2022-12-12 PROCEDURE — 99999 PR PBB SHADOW E&M-EST. PATIENT-LVL III: ICD-10-PCS | Mod: PBBFAC,,, | Performed by: PHYSICIAN ASSISTANT

## 2022-12-12 NOTE — PROGRESS NOTES
This note was completed with dictation software and grammatical errors may exist.    CC:  Neck pain, back pain    HPI:  The patient is a 32-year-old woman with a history of cervical DDD who presented in referral from [facility] for cervical radiculopathy.  She is status post left L5/S1 transforaminal epidural steroid injection on 02/21/2022 with a couple days of relief, now reporting 0% relief.  She continues to have left greater than right low back pain radiating into her left leg.  This is fairly constant.  However, she does have a new pain in her left upper lumbar region that developed last Wednesday without any incident.  This pain is intermittent but very severe.  She also reports that on Saturday the pain became intense after going to the mall.  Otherwise she has been walking 2 and half to 3 miles about 3 times a week for exercise.  She reports some numbness in her left low back and left upper buttock.  She does develop numbness in her left leg if driving for 1 hour.  She denies weakness or incontinence.    Prior history: She presents in follow-up for neck pain.  She was sent for direct procedure from to [facility].  She is status post C6/7 interlaminar ZAY on 05/31/2021 with 50% improvement, almost complete improvement of her right arm numbness and tingling however.  She reports that her neck pain began in March, 2021 when she was throwing a medicine ball over her head.  She had sudden pain in the neck, right shoulder and began having numbness and tingling in her right arm.  She reports that pain is aching, numb, sharp, shooting, tingling and deep.  She states that her pain was much worse when having to drive but also with running which she needs to do for her physical fitness test.  She does get some relief with physical therapy, medications including ibuprofen, Mobic and stretching helps.  She does report having chronic migraine headaches, has not seen neurology for this.  In the last  year because of her neck pain this seems to have exacerbated her headaches.    Her other complaint is back pain in the mid and low back for the last year.  Particularly in the low back, radiates out to the lateral hips, sometimes radiates along her posterior thighs into her calves.  This pain is worse with running, also worse with sitting too long and when moving from sitting to standing.  She denies any constant numbness, no bowel or bladder incontinence.    Pain intervention history: She is status post C6/7 interlaminar ZAY on 05/31/2021 with 50% improvement of neck pain, almost 90% improvement of right arm pain.She is status post L5/S1 ZAY on 08/26/2021 with No major benefit.   She is status post left L5/S1 transforaminal epidural steroid injection on 10/21/2021 with 50% relief.   She is status post left L5/S1 transforaminal epidural steroid injection on 12/06/2021 with additional relief for 3 weeks.   She is status post left L5/S1 transforaminal epidural steroid injection on 02/21/2022 with about 50% relief.   She is status post left L5/S1 transforaminal epidural steroid injection on 02/21/2022 with a couple days of relief, now reporting 0% relief.      Spine surgeries:None    Antineuropathics:  Gabapentin 600 mg nightly  NSAIDs:  Mobic, ibuprofen both provide 30-50% relief.  Currently taking nabumetone 750 mg twice daily as needed  Physical therapy:  She has gone to [facility] but this did not give lasting relief and she reports having some increased pain afterwards.  Antidepressants:  Muscle relaxers:  Tizanidine 2 mg nightly as needed  Opioids:  Antiplatelets/Anticoagulants:    ROS:  She reports fatigability, headaches and back pain.  Balance of review of systems is negative.    Lab Results   Component Value Date    HGBA1C 5.2 11/09/2022       Lab Results   Component Value Date    WBC 8.91 06/01/2016    HGB 11.0 (L) 06/01/2016    HCT 32.5 (L) 06/01/2016    MCV 91 06/01/2016     06/01/2016              Past Medical History:   Diagnosis Date    Hyperlipidemia     Migraine     PCOS (polycystic ovarian syndrome)        Past Surgical History:   Procedure Laterality Date    EPIDURAL STEROID INJECTION INTO CERVICAL SPINE N/A 5/31/2021    Procedure: Injection-steroid-epidural-cervical C6/7 spread to the right;  Surgeon: Jigar Gay MD;  Location: [facility] OR;  Service: Pain Management;  Laterality: N/A;    EPIDURAL STEROID INJECTION INTO LUMBAR SPINE N/A 8/26/2021    Procedure: Injection-steroid-epidural-lumbar l5/S1 interlaminar;  Surgeon: Jigar Gay MD;  Location: [facility] OR;  Service: Pain Management;  Laterality: N/A;    EPIDURAL STEROID INJECTION INTO LUMBAR SPINE N/A 11/18/2022    Procedure: Injection-steroid-epidural-lumbar L5/S1;  Surgeon: Jigar Gay MD;  Location: [facility];  Service: Pain Management;  Laterality: N/A;    MOUTH SURGERY      TRANSFORAMINAL EPIDURAL INJECTION OF STEROID Left 10/21/2021    Procedure: Injection,steroid,epidural,transforaminal approach L5/S1;  Surgeon: Jigar Gay MD;  Location: [facility];  Service: Pain Management;  Laterality: Left;    TRANSFORAMINAL EPIDURAL INJECTION OF STEROID Left 12/6/2021    Procedure: Injection,steroid,epidural,transforaminal approach L5/S1;  Surgeon: Jigar Gay MD;  Location: [facility];  Service: Pain Management;  Laterality: Left;    TRANSFORAMINAL EPIDURAL INJECTION OF STEROID Left 2/21/2022    Procedure: Injection,steroid,epidural,transforaminal approach L5/S1;  Surgeon: Jigar Gay MD;  Location: [facility];  Service: Pain Management;  Laterality: Left;    VAGINAL DELIVERY      x3    WISDOM TOOTH EXTRACTION         Social History     Socioeconomic History    Marital status:    Tobacco Use    Smoking status: Never    Smokeless tobacco: Never   Substance and Sexual Activity    Alcohol use: No    Drug use: No    Sexual activity: Yes     Partners: Male         Medications/Allergies: See med  "card    Vitals:    22 0826   BP: 127/73   Pulse: 67   SpO2: 98%   Weight: 84.4 kg (186 lb 1.1 oz)   Height: 5' 9.6" (1.768 m)   PainSc:   4   PainLoc: Back         Physical exam:  Gen: A and O x3, pleasant, well-groomed  Skin: No rashes or obvious lesions  HEENT: PERRLA, no obvious deformities on ears or in canals. Trachea midline.  CVS: Regular rate and rhythm, normal palpable pulses.  Resp:No increased work of breathing, symmetrical chest rise.  Abdomen: Soft, NT/ND.  Musculoskeletal:No antalgic gait.     Neuro:    Iliopsoas Quadriceps Knee  Flexion Tibialis  anterior Gastro- cnemius EHL   Lower: R 5/5 5/5 5/5 5/5 5/5 5/5    L 5/5 5/5 5/5 5/5 5/5 5/5      Left  Right    Patellar DTR 2+ 2+   Achilles DTR 1+ 1+   Munoz Absent  Absent   Clonus Absent Absent   Babinski Absent Absent     Intact and symmetrical to light touch and pinprick in L1-S1 dermatomes bilaterally.    Lumbar spine:  Lumbar spine:  Range of motion is moderately decreased with both flexion and extension with increased left upper lumbar pain during extension.  Oblique extension causes left upper lumbar pain as well.  Frandy's test causes no increased pain on either side.    Supine straight leg raise causes left lower lumbar pain.  Internal and external rotation of the hip causes no increased pain on either side.  Myofascial exam:  Mild tenderness to palpation to the left greater than right lower lumbar paraspinous muscles and left upper lumbar paraspinous muscles.      Imagin21 MRI C-spine:  C2-C3: No disc herniation or significant posterior osseous ridging. No significant spinal canal or foraminal stenosis.   C3-C4: No disc herniation or significant posterior osseous ridging. No significant spinal canal or foraminal stenosis.   C4-C5: Mild disc osteophyte complex.  Slight ventral cord flattening with preserved ventral and dorsal CSF.  No significant foraminal stenosis.   C5-C6: Mild disc osteophyte complex.  Slight ventral cord " flattening with preserved ventral and dorsal CSF.  No significant foraminal stenosis.   C6-C7: Mild disc osteophyte complex.  Preserved ventral and dorsal CSF.  No significant foraminal stenosis.   C7-T1: No disc herniation or significant posterior osseous ridging. No significant spinal canal or foraminal stenosis.    8/6/21 MRI L-spine:  T12-L1: Unremarkable   L1-2: Unremarkable   L2-3: There is only a minimal disc bulge.  There is no spinal canal or significant foraminal stenosis.   L3-4: There is minimal bulging of the annulus.  There is also mild facet joint arthropathy.  There is flattening of the ventral dural sac.  There is borderline spinal stenosis.  The foramina are patent.   L4-5: There is a mild disc bulge slightly eccentric to the left.  There is mild facet joint arthropathy.  There is flattening of the ventral dural sac.  There is no spinal stenosis.  There is only mild bilateral foraminal stenosis without spinal stenosis.   L5-S1: There is mild disc space narrowing.  There is a mild disc bulge with superimposed broad left paracentral and proximal foraminal disc protrusion.  There is no spinal stenosis but there is crowding of the left lateral recess which could irritate the left S1 root.  Also, there is mild-to-moderate left foraminal stenosis.    Assessment:  The patient is a 32-year-old woman with a history of cervical DDD who presented in referral from [facility] for cervical radiculopathy.    1. DDD (degenerative disc disease), lumbar  MRI Lumbar Spine Without Contrast      2. Lumbar radiculopathy        3. Lumbar spondylosis        4. DDD (degenerative disc disease), cervical            Plan:  1. I reviewed her most recent lumbar spine MRI with her and we discussed that I do not see a reason for her left upper lumbar pain.  Since she is having new pain in this region and also her epidural patient not help her low back and left leg I am going to update her lumbar spine MRI for  further evaluation.  We will call her with results and recommendations.  Of note, she did not have relief following her most recent formal physical therapy at [facility].

## 2022-12-27 ENCOUNTER — HOSPITAL ENCOUNTER (OUTPATIENT)
Dept: RADIOLOGY | Facility: HOSPITAL | Age: 32
Discharge: HOME | End: 2022-12-27
Attending: PHYSICIAN ASSISTANT
Payer: OTHER GOVERNMENT

## 2022-12-27 DIAGNOSIS — M51.36 DDD (DEGENERATIVE DISC DISEASE), LUMBAR: ICD-10-CM

## 2022-12-27 PROCEDURE — 72148 MRI LUMBAR SPINE W/O DYE: CPT | Mod: TC,PO

## 2022-12-27 PROCEDURE — 72148 MRI LUMBAR SPINE WITHOUT CONTRAST: ICD-10-PCS | Mod: 26,,, | Performed by: RADIOLOGY

## 2022-12-27 PROCEDURE — 72148 MRI LUMBAR SPINE W/O DYE: CPT | Mod: 26,,, | Performed by: RADIOLOGY

## 2023-01-03 ENCOUNTER — TELEPHONE (OUTPATIENT)
Dept: PAIN MEDICINE | Facility: CLINIC | Age: 33
End: 2023-01-03
Payer: OTHER GOVERNMENT

## 2023-01-03 NOTE — TELEPHONE ENCOUNTER
Please let the patient know that I reviewed her new lumbar spine MRI and she does have some worsening degeneration at L5/S1.  The disc extrusion has enlarged which may be why the most recent epidural did not give her lasting relief and she did not find improvement with physical therapy.  I do not see a specific region why she was having the new pain in the left upper lumbar region.  I would like her to follow-up with Neurosurgery to discuss this further.

## 2023-01-03 NOTE — TELEPHONE ENCOUNTER
Spoke with patient advised of MRI results. Also advised that I would send a message to neurosurgery to reach out and get her scheduled. Pt voiced understanding

## 2023-01-03 NOTE — TELEPHONE ENCOUNTER
Would you mind reaching out to pt to get her scheduled with Neurosurgery eval?    Thanks so much  Prabha

## 2023-02-15 ENCOUNTER — OFFICE VISIT (OUTPATIENT)
Dept: NEUROSURGERY | Facility: CLINIC | Age: 33
End: 2023-02-15
Payer: OTHER GOVERNMENT

## 2023-02-15 VITALS
DIASTOLIC BLOOD PRESSURE: 84 MMHG | BODY MASS INDEX: 26.64 KG/M2 | SYSTOLIC BLOOD PRESSURE: 122 MMHG | HEART RATE: 101 BPM | HEIGHT: 70 IN | WEIGHT: 186.06 LBS

## 2023-02-15 DIAGNOSIS — M54.12 CERVICAL RADICULOPATHY: Primary | ICD-10-CM

## 2023-02-15 DIAGNOSIS — M54.16 LUMBAR RADICULOPATHY: ICD-10-CM

## 2023-02-15 DIAGNOSIS — M51.26 LUMBAR DISC HERNIATION: ICD-10-CM

## 2023-02-15 PROCEDURE — 99214 PR OFFICE/OUTPT VISIT, EST, LEVL IV, 30-39 MIN: ICD-10-PCS | Mod: S$PBB,,, | Performed by: STUDENT IN AN ORGANIZED HEALTH CARE EDUCATION/TRAINING PROGRAM

## 2023-02-15 PROCEDURE — 99214 OFFICE O/P EST MOD 30 MIN: CPT | Mod: S$PBB,,, | Performed by: STUDENT IN AN ORGANIZED HEALTH CARE EDUCATION/TRAINING PROGRAM

## 2023-02-15 NOTE — PROGRESS NOTES
"[facility] - Neurosurgery - [facility]  Clinic Consult     Consult Requested By: Juancho Jerez MD, Lefty Spear,*        SUBJECTIVE:     Chief Complaint:   Chief Complaint   Patient presents with    Back Pain     Pain radiates down leg. Worse on the right side causing spasms in lower back. Sometimes experiencing numbness and tingling.     Neck Pain     Pt also reports "issues" with pain in neck on right side causing pain in arm but only on the right side.       History of Present Illness:  Phill May is a 32 y.o. female who presents with for new evaluation of lower back pain with radiculopathy    She would previously seen [facility] in clinic for evaluation of her cervical spine with imaging without surgical pathology or compression with neck pain and radiculitis  She is seen Jacob or Dr. Gay at [facility] and done well she would improvement with a cervical epidural steroid injection      They have worked with her back pain and leg pain she would an MRI in 2021 the just showed a broad disc bulge at L5-S1 slightly posterolateral to the left.  No severe stenosis at that time.  However she is been managed and progressive symptoms she is had multiple episodes of epidural steroid injections without benefit.  She is undergone physical therapy she is had exacerbations with therapy had to cut down to 2 days a week severe flare-up.  Since her last injection in November without benefit she is had severe flare-up of intermittent mechanical pain with persistent radiculopathy greatest in the left buttock intermittently down the left leg to the foot this has been associated with numbness and tingling.  She is significant alterations to her ADLs with a high frequency and severity.  Intermittently she will have pain down her right leg she is been unable to treat this conservatively now severe symptoms in her most recent flare of for 2 and half to 3 months      She would a new lumbar MRI which " showed a new sequestered disc herniation centrally just slightly left posterolateral      Pertinent and Recent history, provider evaluations, imaging and data reviewed in [facility]           Diagnostic Results:  I have independently reviewed the following imaging:  MRI: Reviewed  r    MRI was reviewed which shows a persistent lumbar degenerative disc disease at L5-S1 there is a broad posterolateral disc bulge similar to 21 however now in addition to this there was a sequestered disc fragment which is centrally just posterolaterally at the L5-S1 segment proximally 5 x 7 mm with ventral displacement of the thecal sac.  There was no severe mass effect or central stenosis  Review of patient's allergies indicates:   Allergen Reactions    No known drug allergies        Past Medical History:   Diagnosis Date    Hyperlipidemia     Migraine     PCOS (polycystic ovarian syndrome)      Past Surgical History:   Procedure Laterality Date    EPIDURAL STEROID INJECTION INTO CERVICAL SPINE N/A 5/31/2021    Procedure: Injection-steroid-epidural-cervical C6/7 spread to the right;  Surgeon: Jigar Gay MD;  Location: [facility] OR;  Service: Pain Management;  Laterality: N/A;    EPIDURAL STEROID INJECTION INTO LUMBAR SPINE N/A 8/26/2021    Procedure: Injection-steroid-epidural-lumbar l5/S1 interlaminar;  Surgeon: Jigar Gay MD;  Location: [facility] OR;  Service: Pain Management;  Laterality: N/A;    EPIDURAL STEROID INJECTION INTO LUMBAR SPINE N/A 11/18/2022    Procedure: Injection-steroid-epidural-lumbar L5/S1;  Surgeon: Jigar Gay MD;  Location: [facility] OR;  Service: Pain Management;  Laterality: N/A;    MOUTH SURGERY      TRANSFORAMINAL EPIDURAL INJECTION OF STEROID Left 10/21/2021    Procedure: Injection,steroid,epidural,transforaminal approach L5/S1;  Surgeon: Jigar Gay MD;  Location: [facility] OR;  Service: Pain Management;  Laterality: Left;    TRANSFORAMINAL EPIDURAL INJECTION OF STEROID Left 12/6/2021     Procedure: Injection,steroid,epidural,transforaminal approach L5/S1;  Surgeon: Jigar Gay MD;  Location: [facility] OR;  Service: Pain Management;  Laterality: Left;    TRANSFORAMINAL EPIDURAL INJECTION OF STEROID Left 2/21/2022    Procedure: Injection,steroid,epidural,transforaminal approach L5/S1;  Surgeon: Jigar Gay MD;  Location: [facility] OR;  Service: Pain Management;  Laterality: Left;    VAGINAL DELIVERY      x3    WISDOM TOOTH EXTRACTION       Family History   Problem Relation Age of Onset    Cancer Father      Social History     Tobacco Use    Smoking status: Never    Smokeless tobacco: Never   Substance Use Topics    Alcohol use: No    Drug use: No          OBJECTIVE:     Vital Signs (Most Recent):       Physical Exam:      General: well developed, well nourished, no distress. .  Mental Status: Awake, Alert, Oriented x 4  Language: No aphasia  Speech: No dysarthria  Head: normocephalic, atraumatic.  Neck: trachea midline, no JVD   Cardiovascular: no LE edema  Pulmonary: normal respirations, no signs of respiratory distress  Abdomen: soft, non-distended    Motor Strength:  No abnormal movements seen.     Strength  Deltoids Triceps Biceps Wrist Extension Wrist Flexion Hand  Interossei     Upper: R 5/5 5/5 5/5 5/5 5/5 5/5 5/5      L 5/5 5/5 5/5 5/5 5/5 5/5 5/5       Iliopsoas Quadriceps Knee  Flexion Tibialis  anterior Gastro- cnemius EHL  Foot Eversion Foot inversion   Lower: R 5/5 5/5 5/5 5/5 5/5 5/5 5/5 5/5 5/5    L 5/5 5/5 5/5 5/5 5/5 5/5 5/5 5/5 5/5     SILT,PP      DTR's: 1  + and symmetric in UE and LE  H      Gait: independent          Straight leg raise: + left               ASSESSMENT/PLAN:     Cervical radiculopathy    Lumbar radiculopathy  -     X-Ray Lumbar Spine Ap Lateral w/Flex Ext; Future; Expected date: 02/15/2023    Lumbar disc herniation          32-year-old female.  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  She has chronic back pain and radiculopathy.  Over the last  10-12 weeks she is had an acute flare-up back pain with radiculopathy left leg pain numbness into the buttock intermittently down to the foot.  She is had symptoms on the right side as well.  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  She is had multiple epidural steroid injections failed she is had flare-ups with physical therapy.  I think she is multiple issues going on consistent with myofascial symptoms and osteoarthritis however her radiculopathy shows progression on the MRI with the sequestered disc fragment though not large 7 x 5 mm in the spinal canal displacing ventrally the thecal sac and combination with a chronic posterolateral disc bulge leading to lateral recess and foraminal stenosis more significant on the left.  We reviewed this in detail treatment options risks benefits of each.  I think she is indicated for a left-sided hemilaminectomy and microdiskectomy  At L5-S1.  She will consider her options she understands this is elective, pain treatment to optimize the environment of her nerve healing        The diagnosis, goals, limitations, risks and benefits of surgery and alternative treatment options where discussed at length (pros/cons). All questions/concerns were addressed. The patient has verbalized a good understanding of the diagnosis, the planned procedure, anticipated post-operative course, overall expectations, and risks including but not limited to: spinal cord or nerve root injury/paralysis, death, nerve injury leading to pain or neurological deficit, csf leak, vascular injury or serious bleeding/need for blood product transfusion/stroke, wrong level surgery, chronic pain/failure to improve or worsening of symptoms, infection, need for further surgery at the same or different levels; medical (eg Heart attack, blood clot, infection) and anesthetic complications.           Janak Galo MD  Neurosurgery

## 2023-02-20 ENCOUNTER — HOSPITAL ENCOUNTER (OUTPATIENT)
Dept: RADIOLOGY | Facility: HOSPITAL | Age: 33
Discharge: HOME | End: 2023-02-20
Attending: STUDENT IN AN ORGANIZED HEALTH CARE EDUCATION/TRAINING PROGRAM
Payer: OTHER GOVERNMENT

## 2023-02-20 DIAGNOSIS — M54.16 LUMBAR RADICULOPATHY: ICD-10-CM

## 2023-02-20 PROCEDURE — 72110 X-RAY EXAM L-2 SPINE 4/>VWS: CPT | Mod: TC,FY,PO

## 2023-02-20 PROCEDURE — 72110 X-RAY EXAM L-2 SPINE 4/>VWS: CPT | Mod: 26,,, | Performed by: RADIOLOGY

## 2023-02-20 PROCEDURE — 72110 XR LUMBAR SPINE AP AND LAT WITH FLEX/EXT: ICD-10-PCS | Mod: 26,,, | Performed by: RADIOLOGY

## 2023-05-04 ENCOUNTER — OFFICE VISIT (OUTPATIENT)
Dept: PAIN MEDICINE | Facility: CLINIC | Age: 33
End: 2023-05-04
Payer: OTHER GOVERNMENT

## 2023-05-04 VITALS
BODY MASS INDEX: 28.46 KG/M2 | SYSTOLIC BLOOD PRESSURE: 136 MMHG | DIASTOLIC BLOOD PRESSURE: 80 MMHG | WEIGHT: 192.13 LBS | HEART RATE: 94 BPM | HEIGHT: 69 IN

## 2023-05-04 DIAGNOSIS — M47.816 LUMBAR SPONDYLOSIS: ICD-10-CM

## 2023-05-04 DIAGNOSIS — M54.16 LUMBAR RADICULOPATHY: ICD-10-CM

## 2023-05-04 DIAGNOSIS — M54.9 DORSALGIA, UNSPECIFIED: ICD-10-CM

## 2023-05-04 DIAGNOSIS — M54.16 LUMBAR RADICULOPATHY: Primary | ICD-10-CM

## 2023-05-04 DIAGNOSIS — M51.36 DDD (DEGENERATIVE DISC DISEASE), LUMBAR: Primary | ICD-10-CM

## 2023-05-04 PROCEDURE — 99999 PR PBB SHADOW E&M-EST. PATIENT-LVL III: CPT | Mod: PBBFAC,,,

## 2023-05-04 PROCEDURE — 99213 OFFICE O/P EST LOW 20 MIN: CPT | Mod: S$PBB,,,

## 2023-05-04 PROCEDURE — 99213 PR OFFICE/OUTPT VISIT, EST, LEVL III, 20-29 MIN: ICD-10-PCS | Mod: S$PBB,,,

## 2023-05-04 PROCEDURE — 99999 PR PBB SHADOW E&M-EST. PATIENT-LVL III: ICD-10-PCS | Mod: PBBFAC,,,

## 2023-05-04 PROCEDURE — 99213 OFFICE O/P EST LOW 20 MIN: CPT | Mod: PBBFAC,PN

## 2023-05-04 RX ORDER — PREGABALIN 50 MG/1
50 CAPSULE ORAL NIGHTLY
Qty: 30 CAPSULE | Refills: 3 | Status: SHIPPED | OUTPATIENT
Start: 2023-05-04 | End: 2023-09-26

## 2023-05-04 NOTE — PROGRESS NOTES
This note was completed with dictation software and grammatical errors may exist.    CC:  Neck pain, back pain    HPI:  The patient is a 32-year-old woman with a history of cervical DDD who presented in referral from [facility] for cervical radiculopathy.  She returns for follow-up with continued lower back pain, 4/10, waxes and wanes, across her lower back with radiation into bilateral legs, alternating.  She reports chronic numbness in her left buttock but denies any other numbness, weakness or any new changes to her bowel bladder function.  She continues to take tizanidine and Lyrica with some relief of her pain.  She denies any ill side effects or adverse events with these medications.  She has been evaluated by Neurosurgery and was offered surgery is going to schedule sometime in the future.    Prior history: She presents in follow-up for neck pain.  She was sent for direct procedure from to [facility].  She is status post C6/7 interlaminar ZAY on 05/31/2021 with 50% improvement, almost complete improvement of her right arm numbness and tingling however.  She reports that her neck pain began in March, 2021 when she was throwing a medicine ball over her head.  She had sudden pain in the neck, right shoulder and began having numbness and tingling in her right arm.  She reports that pain is aching, numb, sharp, shooting, tingling and deep.  She states that her pain was much worse when having to drive but also with running which she needs to do for her physical fitness test.  She does get some relief with physical therapy, medications including ibuprofen, Mobic and stretching helps.  She does report having chronic migraine headaches, has not seen neurology for this.  In the last year because of her neck pain this seems to have exacerbated her headaches.    Her other complaint is back pain in the mid and low back for the last year.  Particularly in the low back, radiates out to the lateral hips,  sometimes radiates along her posterior thighs into her calves.  This pain is worse with running, also worse with sitting too long and when moving from sitting to standing.  She denies any constant numbness, no bowel or bladder incontinence.    Pain intervention history: She is status post C6/7 interlaminar ZAY on 05/31/2021 with 50% improvement of neck pain, almost 90% improvement of right arm pain.She is status post L5/S1 ZAY on 08/26/2021 with No major benefit.   She is status post left L5/S1 transforaminal epidural steroid injection on 10/21/2021 with 50% relief.   She is status post left L5/S1 transforaminal epidural steroid injection on 12/06/2021 with additional relief for 3 weeks.   She is status post left L5/S1 transforaminal epidural steroid injection on 02/21/2022 with about 50% relief.   She is status post left L5/S1 transforaminal epidural steroid injection on 02/21/2022 with a couple days of relief, now reporting 0% relief.      Spine surgeries:None    Antineuropathics:  Gabapentin 600 mg nightly  NSAIDs:  Mobic, ibuprofen both provide 30-50% relief.  Currently taking nabumetone 750 mg twice daily as needed  Physical therapy:  She has gone to [place] physical therapy but this did not give lasting relief and she reports having some increased pain afterwards.  Antidepressants:  Muscle relaxers:  Tizanidine 2 mg nightly as needed  Opioids:  Antiplatelets/Anticoagulants:    ROS:  She reports fatigability, headaches and back pain.  Balance of review of systems is negative.    Lab Results   Component Value Date    HGBA1C 5.2 11/09/2022       Lab Results   Component Value Date    WBC 8.91 06/01/2016    HGB 11.0 (L) 06/01/2016    HCT 32.5 (L) 06/01/2016    MCV 91 06/01/2016     06/01/2016             Past Medical History:   Diagnosis Date    Hyperlipidemia     Migraine     PCOS (polycystic ovarian syndrome)        Past Surgical History:   Procedure Laterality Date    EPIDURAL STEROID INJECTION INTO  "CERVICAL SPINE N/A 5/31/2021    Procedure: Injection-steroid-epidural-cervical C6/7 spread to the right;  Surgeon: Jigar Gay MD;  Location: [facility] OR;  Service: Pain Management;  Laterality: N/A;    EPIDURAL STEROID INJECTION INTO LUMBAR SPINE N/A 8/26/2021    Procedure: Injection-steroid-epidural-lumbar l5/S1 interlaminar;  Surgeon: Jigar Gay MD;  Location: [facility] OR;  Service: Pain Management;  Laterality: N/A;    EPIDURAL STEROID INJECTION INTO LUMBAR SPINE N/A 11/18/2022    Procedure: Injection-steroid-epidural-lumbar L5/S1;  Surgeon: Jigar Gay MD;  Location: [facility] OR;  Service: Pain Management;  Laterality: N/A;    MOUTH SURGERY      TRANSFORAMINAL EPIDURAL INJECTION OF STEROID Left 10/21/2021    Procedure: Injection,steroid,epidural,transforaminal approach L5/S1;  Surgeon: Jigar Gay MD;  Location: [facility] OR;  Service: Pain Management;  Laterality: Left;    TRANSFORAMINAL EPIDURAL INJECTION OF STEROID Left 12/6/2021    Procedure: Injection,steroid,epidural,transforaminal approach L5/S1;  Surgeon: Jigar Gay MD;  Location: [facility] OR;  Service: Pain Management;  Laterality: Left;    TRANSFORAMINAL EPIDURAL INJECTION OF STEROID Left 2/21/2022    Procedure: Injection,steroid,epidural,transforaminal approach L5/S1;  Surgeon: Jigar Gay MD;  Location: [facility] OR;  Service: Pain Management;  Laterality: Left;    VAGINAL DELIVERY      x3    WISDOM TOOTH EXTRACTION         Social History     Socioeconomic History    Marital status:    Tobacco Use    Smoking status: Never    Smokeless tobacco: Never   Substance and Sexual Activity    Alcohol use: No    Drug use: No    Sexual activity: Yes     Partners: Male         Medications/Allergies: See med card    Vitals:    05/04/23 1532   BP: 136/80   Pulse: 94   Weight: 87.1 kg (192 lb 2.1 oz)   Height: 5' 9" (1.753 m)   PainSc:   4   PainLoc: Back           Physical exam:  Gen: A and O x3, pleasant, well-groomed  Skin: No " rashes or obvious lesions  HEENT: PERRLA, no obvious deformities on ears or in canals. Trachea midline.  CVS: Regular rate and rhythm, normal palpable pulses.  Resp:No increased work of breathing, symmetrical chest rise.  Abdomen: Soft, NT/ND.  Musculoskeletal:No antalgic gait.     Neuro:    Iliopsoas Quadriceps Knee  Flexion Tibialis  anterior Gastro- cnemius EHL   Lower: R 5/5 5/5 5/5 5/5 5/5 5/5    L 5/5 5/5 5/5 5/5 5/5 5/5      Left  Right    Patellar DTR 2+ 1+   Achilles DTR 2+ 1+   Munoz Absent  Absent   Clonus Absent Absent   Babinski Absent Absent     Intact and symmetrical to light touch and pinprick in L1-S1 dermatomes bilaterally.    Lumbar spine:  Lumbar spine:  Range of motion is moderately decreased with both flexion and extension with increased left upper lumbar pain during extension.  Oblique extension causes left upper lumbar pain as well.  Frandy's test causes no increased pain on either side.    Supine straight leg raise causes left lower lumbar pain.  Internal and external rotation of the hip causes no increased pain on either side.  Myofascial exam:  Mild tenderness to palpation to the left greater than right lower lumbar paraspinous muscles and left upper lumbar paraspinous muscles.      Imagin21 MRI C-spine:  C2-C3: No disc herniation or significant posterior osseous ridging. No significant spinal canal or foraminal stenosis.   C3-C4: No disc herniation or significant posterior osseous ridging. No significant spinal canal or foraminal stenosis.   C4-C5: Mild disc osteophyte complex.  Slight ventral cord flattening with preserved ventral and dorsal CSF.  No significant foraminal stenosis.   C5-C6: Mild disc osteophyte complex.  Slight ventral cord flattening with preserved ventral and dorsal CSF.  No significant foraminal stenosis.   C6-C7: Mild disc osteophyte complex.  Preserved ventral and dorsal CSF.  No significant foraminal stenosis.   C7-T1: No disc herniation or significant  posterior osseous ridging. No significant spinal canal or foraminal stenosis.    8/6/21 MRI L-spine:  T12-L1: Unremarkable   L1-2: Unremarkable   L2-3: There is only a minimal disc bulge.  There is no spinal canal or significant foraminal stenosis.   L3-4: There is minimal bulging of the annulus.  There is also mild facet joint arthropathy.  There is flattening of the ventral dural sac.  There is borderline spinal stenosis.  The foramina are patent.   L4-5: There is a mild disc bulge slightly eccentric to the left.  There is mild facet joint arthropathy.  There is flattening of the ventral dural sac.  There is no spinal stenosis.  There is only mild bilateral foraminal stenosis without spinal stenosis.   L5-S1: There is mild disc space narrowing.  There is a mild disc bulge with superimposed broad left paracentral and proximal foraminal disc protrusion.  There is no spinal stenosis but there is crowding of the left lateral recess which could irritate the left S1 root.  Also, there is mild-to-moderate left foraminal stenosis.    Assessment:  The patient is a 32-year-old woman with a history of cervical DDD who presented in referral from [facility] for cervical radiculopathy.    1. DDD (degenerative disc disease), lumbar        2. Lumbar radiculopathy        3. Lumbar spondylosis        4. Dorsalgia, unspecified              Plan:  1. At this time, she is not found significant relief with multiple epidurals.  She is been evaluated by Neurosurgery and was offered surgery.  She plans on scheduling this sometime in the future.  2. In the interim while she gets set up with surgery will maximize conservative therapy with rest, at-home PT directed exercises, tizanidine and Lyrica.  I have offered her additional orders for formal PT however she reports in the past it is severely worsened her pain.  3. Prescription for Lyrica 50 mg once daily sent to Dr. Gay today for approval. I have reviewed the  [place] Board of Pharmacy website and there are no abberancies.    4. Follow-up as needed

## 2023-05-31 ENCOUNTER — TELEPHONE (OUTPATIENT)
Dept: NEUROSURGERY | Facility: CLINIC | Age: 33
End: 2023-05-31
Payer: OTHER GOVERNMENT

## 2023-05-31 NOTE — TELEPHONE ENCOUNTER
----- Message from Armand Sweeney, Patient Care Assistant sent at 5/31/2023  2:36 PM CDT -----  Contact: Pt  Type: Needs Medical Advice    Who Called: Pt  Best Call Back Number: [phone]  Inquiry/Question: Pt is calling to reschedule her appt due to having to leave for work on tomorrow morning. Please advise Thank you~

## 2023-07-06 ENCOUNTER — OFFICE VISIT (OUTPATIENT)
Dept: NEUROSURGERY | Facility: CLINIC | Age: 33
End: 2023-07-06
Payer: OTHER GOVERNMENT

## 2023-07-06 ENCOUNTER — PATIENT MESSAGE (OUTPATIENT)
Dept: NEUROSURGERY | Facility: CLINIC | Age: 33
End: 2023-07-06

## 2023-07-06 VITALS
SYSTOLIC BLOOD PRESSURE: 140 MMHG | HEART RATE: 98 BPM | BODY MASS INDEX: 28.64 KG/M2 | RESPIRATION RATE: 18 BRPM | DIASTOLIC BLOOD PRESSURE: 98 MMHG | HEIGHT: 68 IN | WEIGHT: 189 LBS

## 2023-07-06 DIAGNOSIS — G57.00 PIRIFORMIS SYNDROME, UNSPECIFIED LATERALITY: Primary | ICD-10-CM

## 2023-07-06 PROCEDURE — 99214 PR OFFICE/OUTPT VISIT, EST, LEVL IV, 30-39 MIN: ICD-10-PCS | Mod: S$PBB,,, | Performed by: STUDENT IN AN ORGANIZED HEALTH CARE EDUCATION/TRAINING PROGRAM

## 2023-07-06 PROCEDURE — 99214 OFFICE O/P EST MOD 30 MIN: CPT | Mod: S$PBB,,, | Performed by: STUDENT IN AN ORGANIZED HEALTH CARE EDUCATION/TRAINING PROGRAM

## 2023-07-06 NOTE — PROGRESS NOTES
[facility]  Clinic Consult     Consult Requested By: Juancho Jerez MD, No ref. provider found        SUBJECTIVE:     Chief Complaint:   Chief Complaint   Patient presents with    Discuss Surgery      Patient present to clinic today as discuss surgical options      Returns today, MRI from 12/22 with a small focal disc herniation on left at L5-S1  She's maintaining   Her leg pain in better  But has flare up of back pain and neck pain, the inflammation seems to be tied to exacerbating her migraines    PT did not provide lasting benefit  Pain mg ,lyrica and injections has helped but went througth tx last year    History of Present Illness:  Phill May is a 33 y.o. female who presents with for new evaluation of lower back pain with radiculopathy    She would previously seen [facility] in clinic for evaluation of her cervical spine with imaging without surgical pathology or compression with neck pain and radiculitis  She is seen Jacob or Dr. Gay at [facility] and done well she would improvement with a cervical epidural steroid injection      They have worked with her back pain and leg pain she would an MRI in 2021 the just showed a broad disc bulge at L5-S1 slightly posterolateral to the left.  No severe stenosis at that time.  However she is been managed and progressive symptoms she is had multiple episodes of epidural steroid injections without benefit.  She is undergone physical therapy she is had exacerbations with therapy had to cut down to 2 days a week severe flare-up.  Since her last injection in November without benefit she is had severe flare-up of intermittent mechanical pain with persistent radiculopathy greatest in the left buttock intermittently down the left leg to the foot this has been associated with numbness and tingling.  She is significant alterations to her ADLs with a high frequency and severity.  Intermittently she will have pain down her right  leg she is been unable to treat this conservatively now severe symptoms in her most recent flare of for 2 and half to 3 months      She would a new lumbar MRI which showed a new sequestered disc herniation centrally just slightly left posterolateral      Pertinent and Recent history, provider evaluations, imaging and data reviewed in EPIC           Diagnostic Results:  I have independently reviewed the following imaging:  MRI: Reviewed  r    MRI was reviewed which shows a persistent lumbar degenerative disc disease at L5-S1 there is a broad posterolateral disc bulge similar to 21 however now in addition to this there was a sequestered disc fragment which is centrally just posterolaterally at the L5-S1 segment proximally 5 x 7 mm with ventral displacement of the thecal sac.  There was no severe mass effect or central stenosis  Review of patient's allergies indicates:   Allergen Reactions    No known drug allergies        Past Medical History:   Diagnosis Date    Hyperlipidemia     Migraine     PCOS (polycystic ovarian syndrome)      Past Surgical History:   Procedure Laterality Date    EPIDURAL STEROID INJECTION INTO CERVICAL SPINE N/A 5/31/2021    Procedure: Injection-steroid-epidural-cervical C6/7 spread to the right;  Surgeon: Jigar Gay MD;  Location: [facility] OR;  Service: Pain Management;  Laterality: N/A;    EPIDURAL STEROID INJECTION INTO LUMBAR SPINE N/A 8/26/2021    Procedure: Injection-steroid-epidural-lumbar l5/S1 interlaminar;  Surgeon: Jigar Gay MD;  Location: [facility] OR;  Service: Pain Management;  Laterality: N/A;    EPIDURAL STEROID INJECTION INTO LUMBAR SPINE N/A 11/18/2022    Procedure: Injection-steroid-epidural-lumbar L5/S1;  Surgeon: Jigar Gay MD;  Location: [facility] OR;  Service: Pain Management;  Laterality: N/A;    MOUTH SURGERY      TRANSFORAMINAL EPIDURAL INJECTION OF STEROID Left 10/21/2021    Procedure: Injection,steroid,epidural,transforaminal approach L5/S1;  Surgeon:  Jigar Gay MD;  Location: [facility] OR;  Service: Pain Management;  Laterality: Left;    TRANSFORAMINAL EPIDURAL INJECTION OF STEROID Left 12/6/2021    Procedure: Injection,steroid,epidural,transforaminal approach L5/S1;  Surgeon: Jigar Gay MD;  Location: [facility] OR;  Service: Pain Management;  Laterality: Left;    TRANSFORAMINAL EPIDURAL INJECTION OF STEROID Left 2/21/2022    Procedure: Injection,steroid,epidural,transforaminal approach L5/S1;  Surgeon: Jigar Gay MD;  Location: [facility] OR;  Service: Pain Management;  Laterality: Left;    VAGINAL DELIVERY      x3    WISDOM TOOTH EXTRACTION       Family History   Problem Relation Age of Onset    Cancer Father      Social History     Tobacco Use    Smoking status: Never    Smokeless tobacco: Never   Substance Use Topics    Alcohol use: No    Drug use: No          OBJECTIVE:     Vital Signs (Most Recent):  Pulse: 98 (07/06/23 0936)  Resp: 18 (07/06/23 0936)  BP: (!) 140/98 (07/06/23 0936)    Physical Exam:      General: well developed, well nourished, no distress. .  Mental Status: Awake, Alert, Oriented x 4  Language: No aphasia  Speech: No dysarthria  Head: normocephalic, atraumatic.  Neck: trachea midline, no JVD   Cardiovascular: no LE edema  Pulmonary: normal respirations, no signs of respiratory distress  Abdomen: soft, non-distended    Motor Strength:  No abnormal movements seen.     Strength  Deltoids Triceps Biceps Wrist Extension Wrist Flexion Hand  Interossei     Upper: R 5/5 5/5 5/5 5/5 5/5 5/5 5/5      L 5/5 5/5 5/5 5/5 5/5 5/5 5/5       Iliopsoas Quadriceps Knee  Flexion Tibialis  anterior Gastro- cnemius EHL  Foot Eversion Foot inversion   Lower: R 5/5 5/5 5/5 5/5 5/5 5/5 5/5 5/5 5/5    L 5/5 5/5 5/5 5/5 5/5 5/5 5/5 5/5 5/5     SILT,PP      DTR's: 1  + and symmetric in UE and LE        Gait: independent          Straight leg raise: + left               ASSESSMENT/PLAN:     There are no diagnoses linked to this  encounter.      LV  32-year-old female.  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  She has chronic back pain and radiculopathy.  Over the last 10-12 weeks she is had an acute flare-up back pain with radiculopathy left leg pain numbness into the buttock intermittently down to the foot.  She is had symptoms on the right side as well.  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  She is had multiple epidural steroid injections failed she is had flare-ups with physical therapy.  I think she is multiple issues going on consistent with myofascial symptoms and osteoarthritis however her radiculopathy shows progression on the MRI with the sequestered disc fragment though not large 7 x 5 mm in the spinal canal displacing ventrally the thecal sac and combination with a chronic posterolateral disc bulge leading to lateral recess and foraminal stenosis more significant on the left.  We reviewed this in detail treatment options risks benefits of each.  I think she is indicated for a left-sided hemilaminectomy and microdiskectomy  At L5-S1.  She will consider her options she understands this is elective, pain treatment to optimize the environment of her nerve healing      Interval summary  Radiculopathy improved, she had a very focal , small disc fragment, no sever mass effect and improved left radic  C/o back pain and neck pain, bilateral  Intact  Seems inflammatory, at 33 wouldn't recommend fusion for ddd and back pain  Non op tx  F/u pain mg eval      She may have component of piriformis syndrome  Has pain locally and will have transient numbness when sitting on it for prolonged periods of time  Rec eval and PT        Janak Galo MD  Neurosurgery

## 2023-08-11 RX ORDER — TIZANIDINE 2 MG/1
TABLET ORAL
Qty: 60 TABLET | Refills: 2 | Status: SHIPPED | OUTPATIENT
Start: 2023-08-11 | End: 2023-11-27 | Stop reason: SDUPTHER

## 2023-09-24 DIAGNOSIS — M54.16 LUMBAR RADICULOPATHY: ICD-10-CM

## 2023-09-24 DIAGNOSIS — M47.816 LUMBAR SPONDYLOSIS: ICD-10-CM

## 2023-09-26 RX ORDER — PREGABALIN 50 MG/1
CAPSULE ORAL
Qty: 30 CAPSULE | Refills: 2 | Status: SHIPPED | OUTPATIENT
Start: 2023-09-26 | End: 2023-12-29 | Stop reason: SDUPTHER

## 2023-09-26 NOTE — TELEPHONE ENCOUNTER
Requested medication - pregabalin (LYRICA) 50 MG capsule   shows last refill - 8/27/2023  Last office visit - 5/4/2023  Next office visit - 9/29/2023  Patient DOES NOT have a UDS on file under pain management  Patient DOES NOT have a pain contract on file

## 2023-09-29 ENCOUNTER — OFFICE VISIT (OUTPATIENT)
Dept: PAIN MEDICINE | Facility: CLINIC | Age: 33
End: 2023-09-29
Payer: OTHER GOVERNMENT

## 2023-09-29 ENCOUNTER — TELEPHONE (OUTPATIENT)
Dept: PAIN MEDICINE | Facility: CLINIC | Age: 33
End: 2023-09-29
Payer: OTHER GOVERNMENT

## 2023-09-29 VITALS
HEART RATE: 114 BPM | DIASTOLIC BLOOD PRESSURE: 80 MMHG | SYSTOLIC BLOOD PRESSURE: 134 MMHG | BODY MASS INDEX: 28.13 KG/M2 | HEIGHT: 68 IN | WEIGHT: 185.63 LBS

## 2023-09-29 DIAGNOSIS — M51.36 DDD (DEGENERATIVE DISC DISEASE), LUMBAR: ICD-10-CM

## 2023-09-29 DIAGNOSIS — M47.816 LUMBAR SPONDYLOSIS: Primary | ICD-10-CM

## 2023-09-29 PROBLEM — M22.2X2 PATELLOFEMORAL ARTHRALGIA OF BOTH KNEES: Status: ACTIVE | Noted: 2023-09-29

## 2023-09-29 PROBLEM — M22.2X1 PATELLOFEMORAL ARTHRALGIA OF BOTH KNEES: Status: ACTIVE | Noted: 2023-09-29

## 2023-09-29 PROCEDURE — 99999 PR PBB SHADOW E&M-EST. PATIENT-LVL IV: ICD-10-PCS | Mod: PBBFAC,,, | Performed by: PHYSICIAN ASSISTANT

## 2023-09-29 PROCEDURE — 99999 PR PBB SHADOW E&M-EST. PATIENT-LVL IV: CPT | Mod: PBBFAC,,, | Performed by: PHYSICIAN ASSISTANT

## 2023-09-29 PROCEDURE — 99214 OFFICE O/P EST MOD 30 MIN: CPT | Mod: S$PBB,,, | Performed by: PHYSICIAN ASSISTANT

## 2023-09-29 PROCEDURE — 99214 PR OFFICE/OUTPT VISIT, EST, LEVL IV, 30-39 MIN: ICD-10-PCS | Mod: S$PBB,,, | Performed by: PHYSICIAN ASSISTANT

## 2023-09-29 PROCEDURE — 99214 OFFICE O/P EST MOD 30 MIN: CPT | Mod: PBBFAC,PO | Performed by: PHYSICIAN ASSISTANT

## 2023-09-29 RX ORDER — SODIUM CHLORIDE, SODIUM LACTATE, POTASSIUM CHLORIDE, CALCIUM CHLORIDE 600; 310; 30; 20 MG/100ML; MG/100ML; MG/100ML; MG/100ML
INJECTION, SOLUTION INTRAVENOUS CONTINUOUS
Status: CANCELLED | OUTPATIENT
Start: 2023-09-29

## 2023-10-02 NOTE — PROGRESS NOTES
This note was completed with dictation software and grammatical errors may exist.    CC:  Neck pain, back pain    HPI:  The patient is a 33-year-old woman with a history of cervical DDD who presented in referral from [facility] for cervical radiculopathy.  She returns in follow-up today with worsening bilateral low back pain.  She describes this as constant, worse with driving, sitting and doing housework.  She does report some numbness in her right greater than left buttock but her pain remains in her low back.  She denies any lower extremity weakness.  She does report some relief with Lyrica and tizanidine.    Prior history: She presents in follow-up for neck pain.  She was sent for direct procedure from to [facility].  She is status post C6/7 interlaminar ZAY on 05/31/2021 with 50% improvement, almost complete improvement of her right arm numbness and tingling however.  She reports that her neck pain began in March, 2021 when she was throwing a medicine ball over her head.  She had sudden pain in the neck, right shoulder and began having numbness and tingling in her right arm.  She reports that pain is aching, numb, sharp, shooting, tingling and deep.  She states that her pain was much worse when having to drive but also with running which she needs to do for her physical fitness test.  She does get some relief with physical therapy, medications including ibuprofen, Mobic and stretching helps.  She does report having chronic migraine headaches, has not seen neurology for this.  In the last year because of her neck pain this seems to have exacerbated her headaches.    Her other complaint is back pain in the mid and low back for the last year.  Particularly in the low back, radiates out to the lateral hips, sometimes radiates along her posterior thighs into her calves.  This pain is worse with running, also worse with sitting too long and when moving from sitting to standing.  She denies any  constant numbness, no bowel or bladder incontinence.    Pain intervention history: She is status post C6/7 interlaminar ZAY on 05/31/2021 with 50% improvement of neck pain, almost 90% improvement of right arm pain.She is status post L5/S1 ZAY on 08/26/2021 with No major benefit.   She is status post left L5/S1 transforaminal epidural steroid injection on 10/21/2021 with 50% relief.   She is status post left L5/S1 transforaminal epidural steroid injection on 12/06/2021 with additional relief for 3 weeks.   She is status post left L5/S1 transforaminal epidural steroid injection on 02/21/2022 with about 50% relief.   She is status post left L5/S1 transforaminal epidural steroid injection on 02/21/2022 with a couple days of relief, now reporting 0% relief.      Spine surgeries:None    Antineuropathics:  Gabapentin 600 mg nightly  NSAIDs:  Mobic, ibuprofen both provide 30-50% relief.  Currently taking nabumetone 750 mg twice daily as needed  Physical therapy:  She has gone to [place] physical therapy but this did not give lasting relief and she reports having some increased pain afterwards.  Antidepressants:  Muscle relaxers:  Tizanidine 2 mg nightly as needed  Opioids:  Antiplatelets/Anticoagulants:    ROS:  She reports fatigability, headaches and back pain.  Balance of review of systems is negative.    Lab Results   Component Value Date    HGBA1C 5.3 09/25/2023       Lab Results   Component Value Date    WBC 5.21 09/25/2023    HGB 15.0 09/25/2023    HCT 42.0 09/25/2023    MCV 88 09/25/2023     09/25/2023             Past Medical History:   Diagnosis Date    Hyperlipidemia     Migraine     PCOS (polycystic ovarian syndrome)        Past Surgical History:   Procedure Laterality Date    EPIDURAL STEROID INJECTION INTO CERVICAL SPINE N/A 5/31/2021    Procedure: Injection-steroid-epidural-cervical C6/7 spread to the right;  Surgeon: Jigar Gay MD;  Location: [facility];  Service: Pain Management;  Laterality: N/A;  "   EPIDURAL STEROID INJECTION INTO LUMBAR SPINE N/A 8/26/2021    Procedure: Injection-steroid-epidural-lumbar l5/S1 interlaminar;  Surgeon: Jigar Gay MD;  Location: [facility] OR;  Service: Pain Management;  Laterality: N/A;    EPIDURAL STEROID INJECTION INTO LUMBAR SPINE N/A 11/18/2022    Procedure: Injection-steroid-epidural-lumbar L5/S1;  Surgeon: Jigar Gay MD;  Location: [facility] OR;  Service: Pain Management;  Laterality: N/A;    MOUTH SURGERY      TRANSFORAMINAL EPIDURAL INJECTION OF STEROID Left 10/21/2021    Procedure: Injection,steroid,epidural,transforaminal approach L5/S1;  Surgeon: Jigar Gay MD;  Location: [facility] OR;  Service: Pain Management;  Laterality: Left;    TRANSFORAMINAL EPIDURAL INJECTION OF STEROID Left 12/6/2021    Procedure: Injection,steroid,epidural,transforaminal approach L5/S1;  Surgeon: Jigar Gay MD;  Location: [facility] OR;  Service: Pain Management;  Laterality: Left;    TRANSFORAMINAL EPIDURAL INJECTION OF STEROID Left 2/21/2022    Procedure: Injection,steroid,epidural,transforaminal approach L5/S1;  Surgeon: Jigar Gay MD;  Location: [facility] OR;  Service: Pain Management;  Laterality: Left;    VAGINAL DELIVERY      x3    WISDOM TOOTH EXTRACTION         Social History     Socioeconomic History    Marital status:    Tobacco Use    Smoking status: Never    Smokeless tobacco: Never   Substance and Sexual Activity    Alcohol use: No    Drug use: No    Sexual activity: Yes     Partners: Male         Medications/Allergies: See med card    Vitals:    09/29/23 1459   BP: 134/80   Pulse: (!) 114   Weight: 84.2 kg (185 lb 10 oz)   Height: 5' 8" (1.727 m)   PainSc:   4   PainLoc: Back         9/29/2023     2:59 PM 3/31/2022     8:20 AM 1/3/2022     7:47 AM   Last 3 PDI Scores   Pain Disability Index (PDI) 54 42 13           Physical exam:  Gen: A and O x3, pleasant, well-groomed  Skin: No rashes or obvious lesions  HEENT: PERRLA, no obvious deformities on " ears or in canals. Trachea midline.  CVS: Regular rate and rhythm, normal palpable pulses.  Resp:No increased work of breathing, symmetrical chest rise.  Abdomen: Soft, NT/ND.  Musculoskeletal:No antalgic gait.     Neuro:    Iliopsoas Quadriceps Knee  Flexion Tibialis  anterior Gastro- cnemius EHL   Lower: R 5/5 5/5 5/5 5/5 5/5 5/5    L 5/5 5/5 5/5 5/5 5/5 5/5      Left  Right    Patellar DTR 2+ 1+   Achilles DTR 2+ 1+   Munoz Absent  Absent   Clonus Absent Absent   Babinski Absent Absent     Intact and symmetrical to light touch and pinprick in L1-S1 dermatomes bilaterally.    Lumbar spine:  Lumbar spine:  Range of motion is moderately decreased with both flexion and extension with increased low back pain with each maneuver, worse with extension and oblique extension on the corresponding side.  Frandy's test causes no increased pain on either side.    Supine straight leg raise is negative bilaterally.  Internal and external rotation of the hip causes no increased pain on either side.  Myofascial exam:  Mild tenderness to palpation to the bilateral lumbar paraspinous muscles.      Imagin21 MRI C-spine:  C2-C3: No disc herniation or significant posterior osseous ridging. No significant spinal canal or foraminal stenosis.   C3-C4: No disc herniation or significant posterior osseous ridging. No significant spinal canal or foraminal stenosis.   C4-C5: Mild disc osteophyte complex.  Slight ventral cord flattening with preserved ventral and dorsal CSF.  No significant foraminal stenosis.   C5-C6: Mild disc osteophyte complex.  Slight ventral cord flattening with preserved ventral and dorsal CSF.  No significant foraminal stenosis.   C6-C7: Mild disc osteophyte complex.  Preserved ventral and dorsal CSF.  No significant foraminal stenosis.   C7-T1: No disc herniation or significant posterior osseous ridging. No significant spinal canal or foraminal stenosis.    21 MRI L-spine:  T12-L1: Unremarkable   L1-2:  Unremarkable   L2-3: There is only a minimal disc bulge.  There is no spinal canal or significant foraminal stenosis.   L3-4: There is minimal bulging of the annulus.  There is also mild facet joint arthropathy.  There is flattening of the ventral dural sac.  There is borderline spinal stenosis.  The foramina are patent.   L4-5: There is a mild disc bulge slightly eccentric to the left.  There is mild facet joint arthropathy.  There is flattening of the ventral dural sac.  There is no spinal stenosis.  There is only mild bilateral foraminal stenosis without spinal stenosis.   L5-S1: There is mild disc space narrowing.  There is a mild disc bulge with superimposed broad left paracentral and proximal foraminal disc protrusion.  There is no spinal stenosis but there is crowding of the left lateral recess which could irritate the left S1 root.  Also, there is mild-to-moderate left foraminal stenosis.    Assessment:  The patient is a 33-year-old woman with a history of cervical DDD who presented in referral from [facility] for cervical radiculopathy.    1. Lumbar spondylosis        2. DDD (degenerative disc disease), lumbar              Plan:  1. Since her last visit she has seen Neurosurgery and an operation was not suggested at that time.  It was noted that she may have right piriformis syndrome which can we can consider an injection for in the future.  However, her current pain is axial low back pain and we discussed that she has facet arthropathy at L4/5 and L5/S1.  Since her symptoms correspond to this I will schedule her for bilateral L4/5 and L5/S1 diagnostic medial branch nerve blocks.  If successful we will repeat the blocks prior to proceeding with radiofrequency ablation.  2. She will continue her home exercise program.  3. She will continue Lyrica and tizanidine.    4. Follow-up in 4 weeks postprocedure sooner as needed.

## 2023-10-02 NOTE — H&P (VIEW-ONLY)
This note was completed with dictation software and grammatical errors may exist.    CC:  Neck pain, back pain    HPI:  The patient is a 33-year-old woman with a history of cervical DDD who presented in referral from [facility] for cervical radiculopathy.  She returns in follow-up today with worsening bilateral low back pain.  She describes this as constant, worse with driving, sitting and doing housework.  She does report some numbness in her right greater than left buttock but her pain remains in her low back.  She denies any lower extremity weakness.  She does report some relief with Lyrica and tizanidine.    Prior history: She presents in follow-up for neck pain.  She was sent for direct procedure from to [facility].  She is status post C6/7 interlaminar ZAY on 05/31/2021 with 50% improvement, almost complete improvement of her right arm numbness and tingling however.  She reports that her neck pain began in March, 2021 when she was throwing a medicine ball over her head.  She had sudden pain in the neck, right shoulder and began having numbness and tingling in her right arm.  She reports that pain is aching, numb, sharp, shooting, tingling and deep.  She states that her pain was much worse when having to drive but also with running which she needs to do for her physical fitness test.  She does get some relief with physical therapy, medications including ibuprofen, Mobic and stretching helps.  She does report having chronic migraine headaches, has not seen neurology for this.  In the last year because of her neck pain this seems to have exacerbated her headaches.    Her other complaint is back pain in the mid and low back for the last year.  Particularly in the low back, radiates out to the lateral hips, sometimes radiates along her posterior thighs into her calves.  This pain is worse with running, also worse with sitting too long and when moving from sitting to standing.  She denies any  constant numbness, no bowel or bladder incontinence.    Pain intervention history: She is status post C6/7 interlaminar ZAY on 05/31/2021 with 50% improvement of neck pain, almost 90% improvement of right arm pain.She is status post L5/S1 ZAY on 08/26/2021 with No major benefit.   She is status post left L5/S1 transforaminal epidural steroid injection on 10/21/2021 with 50% relief.   She is status post left L5/S1 transforaminal epidural steroid injection on 12/06/2021 with additional relief for 3 weeks.   She is status post left L5/S1 transforaminal epidural steroid injection on 02/21/2022 with about 50% relief.   She is status post left L5/S1 transforaminal epidural steroid injection on 02/21/2022 with a couple days of relief, now reporting 0% relief.      Spine surgeries:None    Antineuropathics:  Gabapentin 600 mg nightly  NSAIDs:  Mobic, ibuprofen both provide 30-50% relief.  Currently taking nabumetone 750 mg twice daily as needed  Physical therapy:  She has gone to [place] physical therapy but this did not give lasting relief and she reports having some increased pain afterwards.  Antidepressants:  Muscle relaxers:  Tizanidine 2 mg nightly as needed  Opioids:  Antiplatelets/Anticoagulants:    ROS:  She reports fatigability, headaches and back pain.  Balance of review of systems is negative.    Lab Results   Component Value Date    HGBA1C 5.3 09/25/2023       Lab Results   Component Value Date    WBC 5.21 09/25/2023    HGB 15.0 09/25/2023    HCT 42.0 09/25/2023    MCV 88 09/25/2023     09/25/2023             Past Medical History:   Diagnosis Date    Hyperlipidemia     Migraine     PCOS (polycystic ovarian syndrome)        Past Surgical History:   Procedure Laterality Date    EPIDURAL STEROID INJECTION INTO CERVICAL SPINE N/A 5/31/2021    Procedure: Injection-steroid-epidural-cervical C6/7 spread to the right;  Surgeon: Jigar Gay MD;  Location: [facility];  Service: Pain Management;  Laterality: N/A;  "   EPIDURAL STEROID INJECTION INTO LUMBAR SPINE N/A 8/26/2021    Procedure: Injection-steroid-epidural-lumbar l5/S1 interlaminar;  Surgeon: Jigar Gay MD;  Location: [facility] OR;  Service: Pain Management;  Laterality: N/A;    EPIDURAL STEROID INJECTION INTO LUMBAR SPINE N/A 11/18/2022    Procedure: Injection-steroid-epidural-lumbar L5/S1;  Surgeon: Jigar Gay MD;  Location: [facility] OR;  Service: Pain Management;  Laterality: N/A;    MOUTH SURGERY      TRANSFORAMINAL EPIDURAL INJECTION OF STEROID Left 10/21/2021    Procedure: Injection,steroid,epidural,transforaminal approach L5/S1;  Surgeon: Jigar Gay MD;  Location: [facility] OR;  Service: Pain Management;  Laterality: Left;    TRANSFORAMINAL EPIDURAL INJECTION OF STEROID Left 12/6/2021    Procedure: Injection,steroid,epidural,transforaminal approach L5/S1;  Surgeon: Jigar Gay MD;  Location: [facility] OR;  Service: Pain Management;  Laterality: Left;    TRANSFORAMINAL EPIDURAL INJECTION OF STEROID Left 2/21/2022    Procedure: Injection,steroid,epidural,transforaminal approach L5/S1;  Surgeon: Jigar Gay MD;  Location: [facility] OR;  Service: Pain Management;  Laterality: Left;    VAGINAL DELIVERY      x3    WISDOM TOOTH EXTRACTION         Social History     Socioeconomic History    Marital status:    Tobacco Use    Smoking status: Never    Smokeless tobacco: Never   Substance and Sexual Activity    Alcohol use: No    Drug use: No    Sexual activity: Yes     Partners: Male         Medications/Allergies: See med card    Vitals:    09/29/23 1459   BP: 134/80   Pulse: (!) 114   Weight: 84.2 kg (185 lb 10 oz)   Height: 5' 8" (1.727 m)   PainSc:   4   PainLoc: Back         9/29/2023     2:59 PM 3/31/2022     8:20 AM 1/3/2022     7:47 AM   Last 3 PDI Scores   Pain Disability Index (PDI) 54 42 13           Physical exam:  Gen: A and O x3, pleasant, well-groomed  Skin: No rashes or obvious lesions  HEENT: PERRLA, no obvious deformities on " ears or in canals. Trachea midline.  CVS: Regular rate and rhythm, normal palpable pulses.  Resp:No increased work of breathing, symmetrical chest rise.  Abdomen: Soft, NT/ND.  Musculoskeletal:No antalgic gait.     Neuro:    Iliopsoas Quadriceps Knee  Flexion Tibialis  anterior Gastro- cnemius EHL   Lower: R 5/5 5/5 5/5 5/5 5/5 5/5    L 5/5 5/5 5/5 5/5 5/5 5/5      Left  Right    Patellar DTR 2+ 1+   Achilles DTR 2+ 1+   Munoz Absent  Absent   Clonus Absent Absent   Babinski Absent Absent     Intact and symmetrical to light touch and pinprick in L1-S1 dermatomes bilaterally.    Lumbar spine:  Lumbar spine:  Range of motion is moderately decreased with both flexion and extension with increased low back pain with each maneuver, worse with extension and oblique extension on the corresponding side.  Frandy's test causes no increased pain on either side.    Supine straight leg raise is negative bilaterally.  Internal and external rotation of the hip causes no increased pain on either side.  Myofascial exam:  Mild tenderness to palpation to the bilateral lumbar paraspinous muscles.      Imagin21 MRI C-spine:  C2-C3: No disc herniation or significant posterior osseous ridging. No significant spinal canal or foraminal stenosis.   C3-C4: No disc herniation or significant posterior osseous ridging. No significant spinal canal or foraminal stenosis.   C4-C5: Mild disc osteophyte complex.  Slight ventral cord flattening with preserved ventral and dorsal CSF.  No significant foraminal stenosis.   C5-C6: Mild disc osteophyte complex.  Slight ventral cord flattening with preserved ventral and dorsal CSF.  No significant foraminal stenosis.   C6-C7: Mild disc osteophyte complex.  Preserved ventral and dorsal CSF.  No significant foraminal stenosis.   C7-T1: No disc herniation or significant posterior osseous ridging. No significant spinal canal or foraminal stenosis.    21 MRI L-spine:  T12-L1: Unremarkable   L1-2:  Unremarkable   L2-3: There is only a minimal disc bulge.  There is no spinal canal or significant foraminal stenosis.   L3-4: There is minimal bulging of the annulus.  There is also mild facet joint arthropathy.  There is flattening of the ventral dural sac.  There is borderline spinal stenosis.  The foramina are patent.   L4-5: There is a mild disc bulge slightly eccentric to the left.  There is mild facet joint arthropathy.  There is flattening of the ventral dural sac.  There is no spinal stenosis.  There is only mild bilateral foraminal stenosis without spinal stenosis.   L5-S1: There is mild disc space narrowing.  There is a mild disc bulge with superimposed broad left paracentral and proximal foraminal disc protrusion.  There is no spinal stenosis but there is crowding of the left lateral recess which could irritate the left S1 root.  Also, there is mild-to-moderate left foraminal stenosis.    Assessment:  The patient is a 33-year-old woman with a history of cervical DDD who presented in referral from [facility] for cervical radiculopathy.    1. Lumbar spondylosis        2. DDD (degenerative disc disease), lumbar              Plan:  1. Since her last visit she has seen Neurosurgery and an operation was not suggested at that time.  It was noted that she may have right piriformis syndrome which can we can consider an injection for in the future.  However, her current pain is axial low back pain and we discussed that she has facet arthropathy at L4/5 and L5/S1.  Since her symptoms correspond to this I will schedule her for bilateral L4/5 and L5/S1 diagnostic medial branch nerve blocks.  If successful we will repeat the blocks prior to proceeding with radiofrequency ablation.  2. She will continue her home exercise program.  3. She will continue Lyrica and tizanidine.    4. Follow-up in 4 weeks postprocedure sooner as needed.

## 2023-10-18 ENCOUNTER — HOSPITAL ENCOUNTER (OUTPATIENT)
Facility: HOSPITAL | Age: 33
Discharge: HOME | End: 2023-10-18
Attending: ANESTHESIOLOGY | Admitting: ANESTHESIOLOGY
Payer: OTHER GOVERNMENT

## 2023-10-18 ENCOUNTER — HOSPITAL ENCOUNTER (OUTPATIENT)
Dept: RADIOLOGY | Facility: HOSPITAL | Age: 33
Discharge: HOME | End: 2023-10-18
Attending: ANESTHESIOLOGY | Admitting: ANESTHESIOLOGY
Payer: OTHER GOVERNMENT

## 2023-10-18 DIAGNOSIS — M54.50 LOWER BACK PAIN: ICD-10-CM

## 2023-10-18 DIAGNOSIS — M47.816 LUMBAR SPONDYLOSIS: ICD-10-CM

## 2023-10-18 LAB
B-HCG UR QL: NEGATIVE
CTP QC/QA: YES

## 2023-10-18 PROCEDURE — 25000003 PHARM REV CODE 250: Mod: PO | Performed by: ANESTHESIOLOGY

## 2023-10-18 PROCEDURE — 64494 PR INJ DX/THER AGNT PARAVERT FACET JOINT,IMG GUIDE,LUMBAR/SAC, 2ND LEVEL: ICD-10-PCS | Mod: 50,KX,, | Performed by: ANESTHESIOLOGY

## 2023-10-18 PROCEDURE — 64494 INJ PARAVERT F JNT L/S 2 LEV: CPT | Mod: 50,KX,, | Performed by: ANESTHESIOLOGY

## 2023-10-18 PROCEDURE — 64493 PR INJ DX/THER AGNT PARAVERT FACET JOINT,IMG GUIDE,LUMBAR/SAC,1ST LVL: ICD-10-PCS | Mod: 50,KX,, | Performed by: ANESTHESIOLOGY

## 2023-10-18 PROCEDURE — 81025 URINE PREGNANCY TEST: CPT | Mod: PO | Performed by: ANESTHESIOLOGY

## 2023-10-18 PROCEDURE — 64493 INJ PARAVERT F JNT L/S 1 LEV: CPT | Mod: 50,KX,, | Performed by: ANESTHESIOLOGY

## 2023-10-18 PROCEDURE — 76000 FLUOROSCOPY <1 HR PHYS/QHP: CPT | Mod: TC,PO

## 2023-10-18 PROCEDURE — 64493 INJ PARAVERT F JNT L/S 1 LEV: CPT | Mod: 50,PO | Performed by: ANESTHESIOLOGY

## 2023-10-18 PROCEDURE — 64494 INJ PARAVERT F JNT L/S 2 LEV: CPT | Mod: 50,PO | Performed by: ANESTHESIOLOGY

## 2023-10-18 PROCEDURE — 63600175 PHARM REV CODE 636 W HCPCS: Mod: PO | Performed by: ANESTHESIOLOGY

## 2023-10-18 RX ORDER — MIDAZOLAM HYDROCHLORIDE 2 MG/2ML
INJECTION, SOLUTION INTRAMUSCULAR; INTRAVENOUS
Status: DISCONTINUED | OUTPATIENT
Start: 2023-10-18 | End: 2023-10-18 | Stop reason: HOSPADM

## 2023-10-18 RX ORDER — BUPIVACAINE HYDROCHLORIDE 5 MG/ML
INJECTION, SOLUTION EPIDURAL; INTRACAUDAL
Status: DISCONTINUED | OUTPATIENT
Start: 2023-10-18 | End: 2023-10-18 | Stop reason: HOSPADM

## 2023-10-18 RX ORDER — LIDOCAINE HYDROCHLORIDE 10 MG/ML
INJECTION, SOLUTION EPIDURAL; INFILTRATION; INTRACAUDAL; PERINEURAL
Status: DISCONTINUED | OUTPATIENT
Start: 2023-10-18 | End: 2023-10-18 | Stop reason: HOSPADM

## 2023-10-18 RX ORDER — SODIUM CHLORIDE, SODIUM LACTATE, POTASSIUM CHLORIDE, CALCIUM CHLORIDE 600; 310; 30; 20 MG/100ML; MG/100ML; MG/100ML; MG/100ML
INJECTION, SOLUTION INTRAVENOUS CONTINUOUS
Status: DISCONTINUED | OUTPATIENT
Start: 2023-10-18 | End: 2023-10-18 | Stop reason: HOSPADM

## 2023-10-18 RX ADMIN — SODIUM CHLORIDE, POTASSIUM CHLORIDE, SODIUM LACTATE AND CALCIUM CHLORIDE: 600; 310; 30; 20 INJECTION, SOLUTION INTRAVENOUS at 02:10

## 2023-10-18 NOTE — DISCHARGE SUMMARY
[facility] - Surgery  Discharge Note  Short Stay    Procedure(s) (LRB):  Block-nerve-medial branch-lumbar Bilat L4/5 and L5/S1 (Bilateral)      OUTCOME: Patient tolerated treatment/procedure well without complication and is now ready for discharge.    DISPOSITION: Home or Self Care    FINAL DIAGNOSIS:  Lumbar spondylosis    FOLLOWUP: In clinic    DISCHARGE INSTRUCTIONS:    Discharge Procedure Orders   Diet Adult Regular     No dressing needed     Notify your health care provider if you experience any of the following:  temperature >100.4     Activity as tolerated

## 2023-10-19 VITALS
RESPIRATION RATE: 17 BRPM | HEART RATE: 79 BPM | TEMPERATURE: 98 F | SYSTOLIC BLOOD PRESSURE: 124 MMHG | OXYGEN SATURATION: 100 % | DIASTOLIC BLOOD PRESSURE: 69 MMHG

## 2023-10-26 DIAGNOSIS — M47.816 LUMBAR SPONDYLOSIS: Primary | ICD-10-CM

## 2023-10-26 RX ORDER — SODIUM CHLORIDE, SODIUM LACTATE, POTASSIUM CHLORIDE, CALCIUM CHLORIDE 600; 310; 30; 20 MG/100ML; MG/100ML; MG/100ML; MG/100ML
INJECTION, SOLUTION INTRAVENOUS CONTINUOUS
Status: CANCELLED | OUTPATIENT
Start: 2023-11-20

## 2023-11-20 ENCOUNTER — HOSPITAL ENCOUNTER (OUTPATIENT)
Dept: RADIOLOGY | Facility: HOSPITAL | Age: 33
Discharge: HOME | End: 2023-11-20
Attending: ANESTHESIOLOGY | Admitting: ANESTHESIOLOGY
Payer: OTHER GOVERNMENT

## 2023-11-20 ENCOUNTER — HOSPITAL ENCOUNTER (OUTPATIENT)
Facility: HOSPITAL | Age: 33
Discharge: HOME | End: 2023-11-20
Attending: ANESTHESIOLOGY | Admitting: ANESTHESIOLOGY
Payer: OTHER GOVERNMENT

## 2023-11-20 VITALS
DIASTOLIC BLOOD PRESSURE: 68 MMHG | RESPIRATION RATE: 18 BRPM | HEART RATE: 72 BPM | TEMPERATURE: 98 F | WEIGHT: 181 LBS | HEIGHT: 67 IN | SYSTOLIC BLOOD PRESSURE: 128 MMHG | OXYGEN SATURATION: 99 % | BODY MASS INDEX: 28.41 KG/M2

## 2023-11-20 DIAGNOSIS — M54.50 LOWER BACK PAIN: ICD-10-CM

## 2023-11-20 DIAGNOSIS — M47.816 LUMBAR SPONDYLOSIS: ICD-10-CM

## 2023-11-20 LAB
B-HCG UR QL: NEGATIVE
CTP QC/QA: YES

## 2023-11-20 PROCEDURE — 64493 INJ PARAVERT F JNT L/S 1 LEV: CPT | Mod: 50,PO | Performed by: ANESTHESIOLOGY

## 2023-11-20 PROCEDURE — 81025 URINE PREGNANCY TEST: CPT | Mod: PO | Performed by: ANESTHESIOLOGY

## 2023-11-20 PROCEDURE — 76000 FLUOROSCOPY <1 HR PHYS/QHP: CPT | Mod: TC,PO

## 2023-11-20 PROCEDURE — A4216 STERILE WATER/SALINE, 10 ML: HCPCS | Mod: PO | Performed by: ANESTHESIOLOGY

## 2023-11-20 PROCEDURE — 64494 INJ PARAVERT F JNT L/S 2 LEV: CPT | Mod: 50,PO | Performed by: ANESTHESIOLOGY

## 2023-11-20 PROCEDURE — 63600175 PHARM REV CODE 636 W HCPCS: Mod: PO | Performed by: ANESTHESIOLOGY

## 2023-11-20 PROCEDURE — 64493 INJ PARAVERT F JNT L/S 1 LEV: CPT | Mod: 50,KX,, | Performed by: ANESTHESIOLOGY

## 2023-11-20 PROCEDURE — 64493 PR INJ DX/THER AGNT PARAVERT FACET JOINT,IMG GUIDE,LUMBAR/SAC,1ST LVL: ICD-10-PCS | Mod: 50,KX,, | Performed by: ANESTHESIOLOGY

## 2023-11-20 PROCEDURE — 25000003 PHARM REV CODE 250: Mod: PO | Performed by: ANESTHESIOLOGY

## 2023-11-20 PROCEDURE — 64494 INJ PARAVERT F JNT L/S 2 LEV: CPT | Mod: 50,KX,, | Performed by: ANESTHESIOLOGY

## 2023-11-20 PROCEDURE — 25500020 PHARM REV CODE 255: Mod: PO | Performed by: ANESTHESIOLOGY

## 2023-11-20 PROCEDURE — 64494 PR INJ DX/THER AGNT PARAVERT FACET JOINT,IMG GUIDE,LUMBAR/SAC, 2ND LEVEL: ICD-10-PCS | Mod: 50,KX,, | Performed by: ANESTHESIOLOGY

## 2023-11-20 RX ORDER — SODIUM CHLORIDE, SODIUM LACTATE, POTASSIUM CHLORIDE, CALCIUM CHLORIDE 600; 310; 30; 20 MG/100ML; MG/100ML; MG/100ML; MG/100ML
INJECTION, SOLUTION INTRAVENOUS CONTINUOUS
Status: DISCONTINUED | OUTPATIENT
Start: 2023-11-20 | End: 2023-11-20 | Stop reason: HOSPADM

## 2023-11-20 RX ORDER — ALPRAZOLAM 0.5 MG/1
1 TABLET, ORALLY DISINTEGRATING ORAL
Status: DISCONTINUED | OUTPATIENT
Start: 2023-11-20 | End: 2023-11-20 | Stop reason: HOSPADM

## 2023-11-20 RX ORDER — SODIUM CHLORIDE 9 MG/ML
INJECTION, SOLUTION INTRAMUSCULAR; INTRAVENOUS; SUBCUTANEOUS
Status: DISCONTINUED | OUTPATIENT
Start: 2023-11-20 | End: 2023-11-20 | Stop reason: HOSPADM

## 2023-11-20 RX ORDER — LIDOCAINE HYDROCHLORIDE 10 MG/ML
INJECTION INFILTRATION; PERINEURAL
Status: DISCONTINUED | OUTPATIENT
Start: 2023-11-20 | End: 2023-11-20 | Stop reason: HOSPADM

## 2023-11-20 RX ORDER — METHYLPREDNISOLONE ACETATE 80 MG/ML
INJECTION, SUSPENSION INTRA-ARTICULAR; INTRALESIONAL; INTRAMUSCULAR; SOFT TISSUE
Status: DISCONTINUED | OUTPATIENT
Start: 2023-11-20 | End: 2023-11-20 | Stop reason: HOSPADM

## 2023-11-20 RX ADMIN — SODIUM CHLORIDE, POTASSIUM CHLORIDE, SODIUM LACTATE AND CALCIUM CHLORIDE: 600; 310; 30; 20 INJECTION, SOLUTION INTRAVENOUS at 12:11

## 2023-11-20 RX ADMIN — ALPRAZOLAM 1 MG: 0.5 TABLET, ORALLY DISINTEGRATING ORAL at 01:11

## 2023-11-20 NOTE — DISCHARGE SUMMARY
[facility] - Surgery  Discharge Note  Short Stay    Procedure(s) (LRB):  BLOCK, NERVE, FACET JOINT, LUMBAR, MEDIAL BRANCH Bilat L4/5 and L5/S1 (Bilateral)      OUTCOME: Patient tolerated treatment/procedure well without complication and is now ready for discharge.    DISPOSITION: Home or Self Care    FINAL DIAGNOSIS:  Lumbar spondylosis    FOLLOWUP: In clinic    DISCHARGE INSTRUCTIONS:    Discharge Procedure Orders   Diet Adult Regular     No dressing needed     Notify your health care provider if you experience any of the following:  temperature >100.4     Activity as tolerated

## 2023-11-20 NOTE — OP NOTE
PROCEDURE DATE: 11/20/2023    PROCEDURE:  Diagnostic bilateral L4/5 and L5/S1 medial branch nerve block     DIAGNOSIS:  Lumbar spondylosis    Post Op diagnosis: Same    PHYSICIAN: Jigar Gay MD    MEDICATIONS INJECTED: 0.25% bupivicaine, 1ml at each level    LOCAL ANESTHETIC USED: Lidocaine 1%, 2ml at each level    SEDATION MEDICATIONS:none    ESTIMATED BLOOD LOSS:  none    COMPLICATIONS:  none    TECHNIQUE: A time out was taken to identify the patient, procedure and side of the procedure. The patient was placed in a prone position, then prepped and draped in the usual sterile fashion using ChloraPrep and sterile towels.  The levels were determined under fluoroscopic guidance and then marked.  Local anesthetic was given by raising a wheal at the skin over each site and then infiltrated approximately 2cm deeper.  A 25-gauge 3.5 inch needle was introduced to the anatomic location of the right and then left L4/5 and L5/S1 medial branch nerves on the bilateral side.  The above medication was then injected. The patient tolerated the procedure well.     The patient was monitored after the procedure. The patient will be contacted in the next few days to determine extent of relief.  Patient was given post procedure and discharge instructions to follow at home.  The patient was discharged in a stable condition.

## 2023-11-27 RX ORDER — TIZANIDINE 2 MG/1
2 TABLET ORAL EVERY 8 HOURS PRN
Qty: 60 TABLET | Refills: 2 | Status: SHIPPED | OUTPATIENT
Start: 2023-11-27 | End: 2024-03-01

## 2023-12-22 ENCOUNTER — PATIENT MESSAGE (OUTPATIENT)
Dept: PAIN MEDICINE | Facility: CLINIC | Age: 33
End: 2023-12-22
Payer: OTHER GOVERNMENT

## 2023-12-27 NOTE — TELEPHONE ENCOUNTER
Relief lasted 5/6 hours. Was able to more household chores than usually. Usually can't stand to wash dishes no longer than 2 mins and was able to do so.

## 2023-12-28 DIAGNOSIS — M47.816 LUMBAR SPONDYLOSIS: ICD-10-CM

## 2023-12-28 DIAGNOSIS — M54.16 LUMBAR RADICULOPATHY: ICD-10-CM

## 2023-12-28 RX ORDER — PREGABALIN 50 MG/1
CAPSULE ORAL
Qty: 30 CAPSULE | Refills: 2 | Status: CANCELLED | OUTPATIENT
Start: 2023-12-28

## 2023-12-29 RX ORDER — PREGABALIN 50 MG/1
CAPSULE ORAL
Qty: 30 CAPSULE | Refills: 2 | Status: SHIPPED | OUTPATIENT
Start: 2023-12-29 | End: 2024-04-01 | Stop reason: SDUPTHER

## 2024-01-18 DIAGNOSIS — M47.816 LUMBAR SPONDYLOSIS: Primary | ICD-10-CM

## 2024-01-18 RX ORDER — ALPRAZOLAM 1 MG/1
1 TABLET, ORALLY DISINTEGRATING ORAL ONCE AS NEEDED
Status: CANCELLED | OUTPATIENT
Start: 2024-01-18 | End: 2035-06-16

## 2024-01-18 RX ORDER — SODIUM CHLORIDE, SODIUM LACTATE, POTASSIUM CHLORIDE, CALCIUM CHLORIDE 600; 310; 30; 20 MG/100ML; MG/100ML; MG/100ML; MG/100ML
INJECTION, SOLUTION INTRAVENOUS CONTINUOUS
Status: CANCELLED | OUTPATIENT
Start: 2024-01-18

## 2024-01-24 PROBLEM — E78.2 MIXED HYPERLIPIDEMIA: Status: ACTIVE | Noted: 2024-01-24

## 2024-01-26 ENCOUNTER — HOSPITAL ENCOUNTER (OUTPATIENT)
Dept: RADIOLOGY | Facility: HOSPITAL | Age: 34
Discharge: HOME | End: 2024-01-26
Attending: ANESTHESIOLOGY | Admitting: ANESTHESIOLOGY
Payer: OTHER GOVERNMENT

## 2024-01-26 ENCOUNTER — HOSPITAL ENCOUNTER (OUTPATIENT)
Facility: HOSPITAL | Age: 34
Discharge: HOME | End: 2024-01-26
Attending: ANESTHESIOLOGY | Admitting: ANESTHESIOLOGY
Payer: OTHER GOVERNMENT

## 2024-01-26 VITALS
HEART RATE: 98 BPM | DIASTOLIC BLOOD PRESSURE: 74 MMHG | BODY MASS INDEX: 27.28 KG/M2 | WEIGHT: 180 LBS | HEIGHT: 68 IN | RESPIRATION RATE: 17 BRPM | OXYGEN SATURATION: 99 % | SYSTOLIC BLOOD PRESSURE: 126 MMHG | TEMPERATURE: 98 F

## 2024-01-26 DIAGNOSIS — M54.50 LOWER BACK PAIN: ICD-10-CM

## 2024-01-26 DIAGNOSIS — M47.816 LUMBAR SPONDYLOSIS: ICD-10-CM

## 2024-01-26 LAB
B-HCG UR QL: NEGATIVE
CTP QC/QA: YES

## 2024-01-26 PROCEDURE — 64635 DESTROY LUMB/SAC FACET JNT: CPT | Mod: 50,PO | Performed by: ANESTHESIOLOGY

## 2024-01-26 PROCEDURE — 64636 DESTROY L/S FACET JNT ADDL: CPT | Mod: 50,,, | Performed by: ANESTHESIOLOGY

## 2024-01-26 PROCEDURE — 64636 DESTROY L/S FACET JNT ADDL: CPT | Mod: 50,PO | Performed by: ANESTHESIOLOGY

## 2024-01-26 PROCEDURE — 64635 DESTROY LUMB/SAC FACET JNT: CPT | Mod: 50,,, | Performed by: ANESTHESIOLOGY

## 2024-01-26 PROCEDURE — 81025 URINE PREGNANCY TEST: CPT | Mod: PO | Performed by: ANESTHESIOLOGY

## 2024-01-26 PROCEDURE — 76000 FLUOROSCOPY <1 HR PHYS/QHP: CPT | Mod: TC,PO

## 2024-01-26 PROCEDURE — 25000003 PHARM REV CODE 250: Mod: PO | Performed by: ANESTHESIOLOGY

## 2024-01-26 PROCEDURE — 63600175 PHARM REV CODE 636 W HCPCS: Mod: PO | Performed by: ANESTHESIOLOGY

## 2024-01-26 RX ORDER — SODIUM CHLORIDE, SODIUM LACTATE, POTASSIUM CHLORIDE, CALCIUM CHLORIDE 600; 310; 30; 20 MG/100ML; MG/100ML; MG/100ML; MG/100ML
INJECTION, SOLUTION INTRAVENOUS CONTINUOUS
Status: DISCONTINUED | OUTPATIENT
Start: 2024-01-26 | End: 2024-01-26 | Stop reason: HOSPADM

## 2024-01-26 RX ORDER — LIDOCAINE HYDROCHLORIDE 10 MG/ML
INJECTION, SOLUTION EPIDURAL; INFILTRATION; INTRACAUDAL; PERINEURAL
Status: DISCONTINUED | OUTPATIENT
Start: 2024-01-26 | End: 2024-01-26 | Stop reason: HOSPADM

## 2024-01-26 RX ORDER — MIDAZOLAM HYDROCHLORIDE 2 MG/2ML
INJECTION, SOLUTION INTRAMUSCULAR; INTRAVENOUS
Status: DISCONTINUED | OUTPATIENT
Start: 2024-01-26 | End: 2024-01-26 | Stop reason: HOSPADM

## 2024-01-26 RX ORDER — FENTANYL CITRATE 50 UG/ML
INJECTION, SOLUTION INTRAMUSCULAR; INTRAVENOUS
Status: DISCONTINUED | OUTPATIENT
Start: 2024-01-26 | End: 2024-01-26 | Stop reason: HOSPADM

## 2024-01-26 RX ORDER — LIDOCAINE HYDROCHLORIDE 20 MG/ML
INJECTION, SOLUTION EPIDURAL; INFILTRATION; INTRACAUDAL; PERINEURAL
Status: DISCONTINUED | OUTPATIENT
Start: 2024-01-26 | End: 2024-01-26 | Stop reason: HOSPADM

## 2024-01-26 RX ORDER — METHYLPREDNISOLONE ACETATE 40 MG/ML
INJECTION, SUSPENSION INTRA-ARTICULAR; INTRALESIONAL; INTRAMUSCULAR; SOFT TISSUE
Status: DISCONTINUED | OUTPATIENT
Start: 2024-01-26 | End: 2024-01-26 | Stop reason: HOSPADM

## 2024-01-26 RX ADMIN — SODIUM CHLORIDE, POTASSIUM CHLORIDE, SODIUM LACTATE AND CALCIUM CHLORIDE: 600; 310; 30; 20 INJECTION, SOLUTION INTRAVENOUS at 09:01

## 2024-01-26 NOTE — DISCHARGE SUMMARY
[facility] - Surgery  Discharge Note  Short Stay    Procedure(s) (LRB):  Radiofrequency Ablation, Nerve, Spinal, Lumbar, Medial Branch, L4/5 and L5/S1 (Bilateral)      OUTCOME: Patient tolerated treatment/procedure well without complication and is now ready for discharge.    DISPOSITION: Home or Self Care    FINAL DIAGNOSIS:  Lumbar spondylosis    FOLLOWUP: In clinic    DISCHARGE INSTRUCTIONS:    Discharge Procedure Orders   Diet Adult Regular     No dressing needed     Notify your health care provider if you experience any of the following:  temperature >100.4     Activity as tolerated

## 2024-01-26 NOTE — OP NOTE
PROCEDURE DATE: 1/26/2024    PROCEDURE:  Radiofrequency ablation of the bilateral L4/5 and L5/S1 medial branch nerves on the bilateral-side utilizing fluoroscopy    DIAGNOSIS:  Lumbar spondylosis    Post op Diagnosis: Same    PHYSICIAN: Jigar Gay MD    MEDICATIONS INJECTED:  From a mixture of 4ml of 2% lidocaine and 40mg of methylprednisone, 1ml of this solution was injected at each level.    LOCAL ANESTHETIC USED: Lidocaine 1%, 4 ml given at each site.    SEDATION MEDICATIONS: 4mg versed, 25mcg fentanyl    ESTIMATED BLOOD LOSS:  none    COMPLICATIONS:  none    TECHNIQUE:  A time out was taken to identify patient and procedure side prior to starting the procedure. Laying in a prone position, the patient was prepped and draped in the usual sterile fashion using ChloraPrep and sterile towels.  The levels were determined under fluoroscopic guidance and then marked.  Local anesthetic was given by raising a wheal at the skin over each site and then infiltrated approximately 2cm deeper.  A 20-gauge  100 mm Fanchimp RF needle was introduced to the anatomic location of the right and then left L4/5 and L5/S1 medial branch nerves.  Motor stimulation up to 2 Volts at each level confirmed no motor nerve involvement.  Impedance was less than 800 ohms at each level. The above noted medication was then injected slowly.  Ablation was performed per level utilizing Beeson radiofrequency generator 80°C for 90 seconds. The patient tolerated the procedure well.     The patient was monitored after the procedure.  Patient was given post procedure and discharge instructions to follow at home.  The patient was discharged in a stable condition

## 2024-01-26 NOTE — H&P
CC: Back pain    HPI: The patient is a 34yo woman with a history of lumbar spondylosis here for bilateral L4/5 and L5/S1 RFA. There are no major changes in history and physical from 9/29/23.    Past Medical History:   Diagnosis Date    Hyperlipidemia     Migraine     PCOS (polycystic ovarian syndrome)        Past Surgical History:   Procedure Laterality Date    EPIDURAL STEROID INJECTION INTO CERVICAL SPINE N/A 5/31/2021    Procedure: Injection-steroid-epidural-cervical C6/7 spread to the right;  Surgeon: Jigar Gay MD;  Location: [facility] OR;  Service: Pain Management;  Laterality: N/A;    EPIDURAL STEROID INJECTION INTO LUMBAR SPINE N/A 8/26/2021    Procedure: Injection-steroid-epidural-lumbar l5/S1 interlaminar;  Surgeon: Jigar Gay MD;  Location: [facility] OR;  Service: Pain Management;  Laterality: N/A;    EPIDURAL STEROID INJECTION INTO LUMBAR SPINE N/A 11/18/2022    Procedure: Injection-steroid-epidural-lumbar L5/S1;  Surgeon: Jigar Gay MD;  Location: [facility] OR;  Service: Pain Management;  Laterality: N/A;    INJECTION OF ANESTHETIC AGENT AROUND MEDIAL BRANCH NERVES INNERVATING LUMBAR FACET JOINT Bilateral 10/18/2023    Procedure: Block-nerve-medial branch-lumbar Bilat L4/5 and L5/S1;  Surgeon: Jigar Gay MD;  Location: [facility] OR;  Service: Pain Management;  Laterality: Bilateral;  Bilat L4/5 and L5/S1    INJECTION OF ANESTHETIC AGENT AROUND MEDIAL BRANCH NERVES INNERVATING LUMBAR FACET JOINT Bilateral 11/20/2023    Procedure: BLOCK, NERVE, FACET JOINT, LUMBAR, MEDIAL BRANCH Bilat L4/5 and L5/S1;  Surgeon: Jigar Gay MD;  Location: [facility] OR;  Service: Pain Management;  Laterality: Bilateral;    MOUTH SURGERY      TRANSFORAMINAL EPIDURAL INJECTION OF STEROID Left 10/21/2021    Procedure: Injection,steroid,epidural,transforaminal approach L5/S1;  Surgeon: Jigar Gay MD;  Location: [facility] OR;  Service: Pain Management;  Laterality: Left;    TRANSFORAMINAL EPIDURAL  "INJECTION OF STEROID Left 12/6/2021    Procedure: Injection,steroid,epidural,transforaminal approach L5/S1;  Surgeon: Jigar Gay MD;  Location: [facility] OR;  Service: Pain Management;  Laterality: Left;    TRANSFORAMINAL EPIDURAL INJECTION OF STEROID Left 2/21/2022    Procedure: Injection,steroid,epidural,transforaminal approach L5/S1;  Surgeon: Jigar Gay MD;  Location: [facility] OR;  Service: Pain Management;  Laterality: Left;    VAGINAL DELIVERY      x3    WISDOM TOOTH EXTRACTION         Family History   Problem Relation Age of Onset    Cancer Father        Social History     Socioeconomic History    Marital status:    Tobacco Use    Smoking status: Never    Smokeless tobacco: Never   Substance and Sexual Activity    Alcohol use: No    Drug use: No    Sexual activity: Yes     Partners: Male       Current Facility-Administered Medications   Medication Dose Route Frequency Provider Last Rate Last Admin    lactated ringers infusion   Intravenous Continuous Jigar Gay MD 25 mL/hr at 01/26/24 0933 New Bag at 01/26/24 0933       Review of patient's allergies indicates:   Allergen Reactions    No known drug allergies        Vitals:    01/23/24 1034 01/26/24 0922   BP:  (!) 142/77   Pulse:  89   Resp:  17   Temp:  97.7 °F (36.5 °C)   SpO2:  100%   Weight: 81.6 kg (180 lb)    Height: 5' 8" (1.727 m)        ASA 2, Mallampati 2    REVIEW OF SYSTEMS:     GENERAL: No weight loss, malaise or fevers.  HEENT:  No recent changes in vision or hearing  NECK: Negative for lumps, no difficulty with swallowing.  RESPIRATORY: Negative for cough, wheezing or shortness of breath, patient denies any recent URI.  CARDIOVASCULAR: Negative for chest pain, leg swelling or palpitations.  GI: Negative for abdominal discomfort, blood in stools or black stools or change in bowel habits.  MUSCULOSKELETAL: See HPI.  SKIN: Negative for lesions, rash, and itching.  PSYCH: No suicidal or homicidal ideations, no current mood " disturbances.  HEMATOLOGY/LYMPHOLOGY: Negative for prolonged bleeding, bruising easily or swollen nodes. Patient is not currently taking any anti-coagulants  ENDO: No history of diabetes or thyroid dysfunction  NEURO: No history of syncope, paralysis, seizures or tremors.All other reviewed and negative other than HPI.    Physical exam:  Gen: A and O x3, pleasant, well-groomed  Skin: No rashes or obvious lesions  HEENT: PERRLA, no obvious deformities on ears or in canals. No thyroid masses, trachea midline, no palpable lymph nodes in neck, axilla.  CVS: Regular rate and rhythm, normal S1 and S2, no murmurs.  Resp: Clear to auscultation bilaterally.  Abdomen: Soft, NT/ND, normal bowel sounds present.  Musculoskeletal/Neuro: Moving all extremities    Assessment:  Lumbar spondylosis  -     Case Request Operating Room: Radiofrequency Ablation, Nerve, Spinal, Lumbar, Medial Branch, L4/5 and L5/S1  -     Place in Outpatient; Standing  -     Vital signs; Standing  -     Place 18-22 gauage peripheral IV ; Standing  -     Verify informed consent; Standing  -     Notify physician ; Standing  -     Notify physician ; Standing  -     Notify physician (specify); Standing  -     Diet NPO; Standing  -     lactated ringers infusion    Other orders  -     IP VTE LOW RISK PATIENT; Standing  -     POCT urine pregnancy; Standing

## 2024-03-01 RX ORDER — TIZANIDINE 2 MG/1
TABLET ORAL
Qty: 60 TABLET | Refills: 2 | Status: SHIPPED | OUTPATIENT
Start: 2024-03-01 | End: 2024-06-04 | Stop reason: SDUPTHER

## 2024-04-01 DIAGNOSIS — M54.16 LUMBAR RADICULOPATHY: ICD-10-CM

## 2024-04-01 DIAGNOSIS — M47.816 LUMBAR SPONDYLOSIS: ICD-10-CM

## 2024-04-03 RX ORDER — PREGABALIN 50 MG/1
CAPSULE ORAL
Qty: 30 CAPSULE | Refills: 2 | Status: SHIPPED | OUTPATIENT
Start: 2024-04-03

## 2024-04-08 ENCOUNTER — TELEPHONE (OUTPATIENT)
Dept: PAIN MEDICINE | Facility: CLINIC | Age: 34
End: 2024-04-08
Payer: OTHER GOVERNMENT

## 2024-04-08 ENCOUNTER — OFFICE VISIT (OUTPATIENT)
Dept: PAIN MEDICINE | Facility: CLINIC | Age: 34
End: 2024-04-08
Payer: OTHER GOVERNMENT

## 2024-04-08 VITALS
SYSTOLIC BLOOD PRESSURE: 149 MMHG | WEIGHT: 191.81 LBS | HEIGHT: 68 IN | BODY MASS INDEX: 29.07 KG/M2 | DIASTOLIC BLOOD PRESSURE: 71 MMHG | HEART RATE: 94 BPM

## 2024-04-08 DIAGNOSIS — M51.36 DDD (DEGENERATIVE DISC DISEASE), LUMBAR: ICD-10-CM

## 2024-04-08 DIAGNOSIS — M54.16 LUMBAR RADICULOPATHY: Primary | ICD-10-CM

## 2024-04-08 DIAGNOSIS — M47.816 LUMBAR SPONDYLOSIS: ICD-10-CM

## 2024-04-08 PROCEDURE — 99214 OFFICE O/P EST MOD 30 MIN: CPT | Mod: S$PBB,,, | Performed by: PHYSICIAN ASSISTANT

## 2024-04-08 PROCEDURE — 99999 PR PBB SHADOW E&M-EST. PATIENT-LVL III: CPT | Mod: PBBFAC,,, | Performed by: PHYSICIAN ASSISTANT

## 2024-04-08 PROCEDURE — 99213 OFFICE O/P EST LOW 20 MIN: CPT | Mod: PBBFAC,PO | Performed by: PHYSICIAN ASSISTANT

## 2024-04-08 RX ORDER — TRAMADOL HYDROCHLORIDE 50 MG/1
50 TABLET ORAL
Qty: 30 TABLET | Refills: 0 | Status: SHIPPED | OUTPATIENT
Start: 2024-04-08 | End: 2024-06-04 | Stop reason: SDUPTHER

## 2024-04-08 NOTE — TELEPHONE ENCOUNTER
Physician - Dr Gay    Type of Procedure/Injection - Lumbar Transforaminal Epidural  L5/S1  and S1         Laterality - Left      Anxiolysis- Local      Need to hold medication - Yes      NSAIDs for 2 days      Clearance needed - No      Follow up - 4 week

## 2024-04-10 DIAGNOSIS — M54.16 LUMBAR RADICULOPATHY: Primary | ICD-10-CM

## 2024-04-10 RX ORDER — ALPRAZOLAM 1 MG/1
1 TABLET, ORALLY DISINTEGRATING ORAL ONCE AS NEEDED
Status: CANCELLED | OUTPATIENT
Start: 2024-04-10 | End: 2035-09-07

## 2024-04-10 NOTE — TELEPHONE ENCOUNTER
Spoke with patient and scheduled. Advised to hold NSAIDS x 2 days prior. Pre op information given and f/u appointment scheduled.

## 2024-04-12 NOTE — PROGRESS NOTES
This note was completed with dictation software and grammatical errors may exist.    CC:  Neck pain, back pain    HPI:  The patient is a 34-year-old woman with a history of cervical DDD who presented in referral from [facility] for cervical radiculopathy.  She is status post bilateral L4/5 and L5/S1 medial branch radiofrequency ablation on 01/26/2024 reporting almost complete relief for the 1st 3 and half weeks but then developed radicular symptoms.  She describes left low back pain with intermittent shooting pain into her left leg momentarily.  This is typically in the left posterolateral thigh.  She reports numbness in the left buttock as well.  The pain is sharp.  She drove to [place] and the pain was severe during this time but the pain had started prior to her drive.  She denies weakness at this time.    Prior history: She presents in follow-up for neck pain.  She was sent for direct procedure from to [facility].  She is status post C6/7 interlaminar ZAY on 05/31/2021 with 50% improvement, almost complete improvement of her right arm numbness and tingling however.  She reports that her neck pain began in March, 2021 when she was throwing a medicine ball over her head.  She had sudden pain in the neck, right shoulder and began having numbness and tingling in her right arm.  She reports that pain is aching, numb, sharp, shooting, tingling and deep.  She states that her pain was much worse when having to drive but also with running which she needs to do for her physical fitness test.  She does get some relief with physical therapy, medications including ibuprofen, Mobic and stretching helps.  She does report having chronic migraine headaches, has not seen neurology for this.  In the last year because of her neck pain this seems to have exacerbated her headaches.    Her other complaint is back pain in the mid and low back for the last year.  Particularly in the low back, radiates out to the  lateral hips, sometimes radiates along her posterior thighs into her calves.  This pain is worse with running, also worse with sitting too long and when moving from sitting to standing.  She denies any constant numbness, no bowel or bladder incontinence.    Pain intervention history: She is status post C6/7 interlaminar ZAY on 05/31/2021 with 50% improvement of neck pain, almost 90% improvement of right arm pain.She is status post L5/S1 ZAY on 08/26/2021 with No major benefit.   She is status post left L5/S1 transforaminal epidural steroid injection on 10/21/2021 with 50% relief.   She is status post left L5/S1 transforaminal epidural steroid injection on 12/06/2021 with additional relief for 3 weeks.   She is status post left L5/S1 transforaminal epidural steroid injection on 02/21/2022 with about 50% relief.   She is status post left L5/S1 transforaminal epidural steroid injection on 02/21/2022 with a couple days of relief, now reporting 0% relief.   She is status post bilateral L4/5 and L5/S1 medial branch radiofrequency ablation on 01/26/2024 reporting almost complete relief for the 1st 3 and half weeks but then developed radicular symptoms.      Spine surgeries:None    Antineuropathics:  Gabapentin 600 mg nightly  NSAIDs:  Mobic, ibuprofen both provide 30-50% relief.  Currently taking nabumetone 750 mg twice daily as needed  Physical therapy:  She has gone to [place] physical therapy but this did not give lasting relief and she reports having some increased pain afterwards.  She does continue her home exercise program.  Antidepressants:  Muscle relaxers:  Tizanidine 2 mg nightly as needed  Opioids:  Antiplatelets/Anticoagulants:    ROS:  She reports fatigability, headaches and back pain.  Balance of review of systems is negative.    Lab Results   Component Value Date    HGBA1C 5.3 09/25/2023       Lab Results   Component Value Date    WBC 5.21 09/25/2023    HGB 15.0 09/25/2023    HCT 42.0 09/25/2023    MCV 88  09/25/2023     09/25/2023             Past Medical History:   Diagnosis Date    Hyperlipidemia     Migraine     PCOS (polycystic ovarian syndrome)        Past Surgical History:   Procedure Laterality Date    EPIDURAL STEROID INJECTION INTO CERVICAL SPINE N/A 5/31/2021    Procedure: Injection-steroid-epidural-cervical C6/7 spread to the right;  Surgeon: Jigar Gay MD;  Location: [facility] OR;  Service: Pain Management;  Laterality: N/A;    EPIDURAL STEROID INJECTION INTO LUMBAR SPINE N/A 8/26/2021    Procedure: Injection-steroid-epidural-lumbar l5/S1 interlaminar;  Surgeon: Jigar Gay MD;  Location: [facility] OR;  Service: Pain Management;  Laterality: N/A;    EPIDURAL STEROID INJECTION INTO LUMBAR SPINE N/A 11/18/2022    Procedure: Injection-steroid-epidural-lumbar L5/S1;  Surgeon: Jigar Gay MD;  Location: [facility] OR;  Service: Pain Management;  Laterality: N/A;    INJECTION OF ANESTHETIC AGENT AROUND MEDIAL BRANCH NERVES INNERVATING LUMBAR FACET JOINT Bilateral 10/18/2023    Procedure: Block-nerve-medial branch-lumbar Bilat L4/5 and L5/S1;  Surgeon: Jigar Gay MD;  Location: [facility] OR;  Service: Pain Management;  Laterality: Bilateral;  Bilat L4/5 and L5/S1    INJECTION OF ANESTHETIC AGENT AROUND MEDIAL BRANCH NERVES INNERVATING LUMBAR FACET JOINT Bilateral 11/20/2023    Procedure: BLOCK, NERVE, FACET JOINT, LUMBAR, MEDIAL BRANCH Bilat L4/5 and L5/S1;  Surgeon: Jigar Gay MD;  Location: [facility] OR;  Service: Pain Management;  Laterality: Bilateral;    MOUTH SURGERY      RADIOFREQUENCY ABLATION OF LUMBAR MEDIAL BRANCH NERVE AT SINGLE LEVEL Bilateral 1/26/2024    Procedure: Radiofrequency Ablation, Nerve, Spinal, Lumbar, Medial Branch, L4/5 and L5/S1;  Surgeon: Jigar Gay MD;  Location: [facility] OR;  Service: Pain Management;  Laterality: Bilateral;    TRANSFORAMINAL EPIDURAL INJECTION OF STEROID Left 10/21/2021    Procedure: Injection,steroid,epidural,transforaminal  "approach L5/S1;  Surgeon: Jigar Gay MD;  Location: [facility] OR;  Service: Pain Management;  Laterality: Left;    TRANSFORAMINAL EPIDURAL INJECTION OF STEROID Left 12/6/2021    Procedure: Injection,steroid,epidural,transforaminal approach L5/S1;  Surgeon: Jigar Gay MD;  Location: [facility] OR;  Service: Pain Management;  Laterality: Left;    TRANSFORAMINAL EPIDURAL INJECTION OF STEROID Left 2/21/2022    Procedure: Injection,steroid,epidural,transforaminal approach L5/S1;  Surgeon: Jigar Gay MD;  Location: [facility] OR;  Service: Pain Management;  Laterality: Left;    VAGINAL DELIVERY      x3    WISDOM TOOTH EXTRACTION         Social History     Socioeconomic History    Marital status:    Tobacco Use    Smoking status: Never    Smokeless tobacco: Never   Substance and Sexual Activity    Alcohol use: No    Drug use: No    Sexual activity: Yes     Partners: Male         Medications/Allergies: See med card    Vitals:    04/08/24 1340   BP: (!) 149/71   Pulse: 94   Weight: 87 kg (191 lb 12.8 oz)   Height: 5' 8" (1.727 m)   PainSc:   6   PainLoc: Back         4/8/2024     1:41 PM 9/29/2023     2:59 PM 3/31/2022     8:20 AM   Last 3 PDI Scores   Pain Disability Index (PDI) 34 [phone] 42           Physical exam:  Gen: A and O x3, pleasant, well-groomed  Skin: No rashes or obvious lesions  HEENT: PERRLA, no obvious deformities on ears or in canals. Trachea midline.  CVS: Regular rate and rhythm, normal palpable pulses.  Resp:No increased work of breathing, symmetrical chest rise.  Abdomen: Soft, NT/ND.  Musculoskeletal:No antalgic gait.     Neuro:    Iliopsoas Quadriceps Knee  Flexion Tibialis  anterior Gastro- cnemius EHL   Lower: R 5/5 5/5 5/5 5/5 5/5 5/5    L 5/5 5/5 5/5 5/5 5/5 5/5      Left  Right    Patellar DTR 2+ 1+   Achilles DTR 2+ 1+   Munoz Absent  Absent   Clonus Absent Absent   Babinski Absent Absent     Intact and symmetrical to light touch and pinprick in L1-S1 dermatomes " bilaterally.    Lumbar spine:  Lumbar spine:  Range of motion is moderately decreased with both flexion and extension with increased left low back pain during each maneuver.  Frandy's test causes no increased pain on either side.    Supine straight leg raise low back and left leg pain.  Internal and external rotation of the hip causes no increased pain on either side.  Myofascial exam:  Moderate tenderness to palpation to the left lower lumbar paraspinous musculature and left upper buttock.      Imagin21 MRI C-spine:  C2-C3: No disc herniation or significant posterior osseous ridging. No significant spinal canal or foraminal stenosis.   C3-C4: No disc herniation or significant posterior osseous ridging. No significant spinal canal or foraminal stenosis.   C4-C5: Mild disc osteophyte complex.  Slight ventral cord flattening with preserved ventral and dorsal CSF.  No significant foraminal stenosis.   C5-C6: Mild disc osteophyte complex.  Slight ventral cord flattening with preserved ventral and dorsal CSF.  No significant foraminal stenosis.   C6-C7: Mild disc osteophyte complex.  Preserved ventral and dorsal CSF.  No significant foraminal stenosis.   C7-T1: No disc herniation or significant posterior osseous ridging. No significant spinal canal or foraminal stenosis.    21 MRI L-spine:  T12-L1: Unremarkable   L1-2: Unremarkable   L2-3: There is only a minimal disc bulge.  There is no spinal canal or significant foraminal stenosis.   L3-4: There is minimal bulging of the annulus.  There is also mild facet joint arthropathy.  There is flattening of the ventral dural sac.  There is borderline spinal stenosis.  The foramina are patent.   L4-5: There is a mild disc bulge slightly eccentric to the left.  There is mild facet joint arthropathy.  There is flattening of the ventral dural sac.  There is no spinal stenosis.  There is only mild bilateral foraminal stenosis without spinal stenosis.   L5-S1: There is  mild disc space narrowing.  There is a mild disc bulge with superimposed broad left paracentral and proximal foraminal disc protrusion.  There is no spinal stenosis but there is crowding of the left lateral recess which could irritate the left S1 root.  Also, there is mild-to-moderate left foraminal stenosis.    12/27/22 MRI lumbar spine   Alignment: Normal.     Vertebral column: Vertebral body heights are maintained.  No evidence of an acute fracture or aggressive marrow replacement process. Moderate intervertebral disc space narrowing with disc extrusion through an annular fissure at L5-S1.  Remainder of the disc spaces are preserved.     Cord: Normal.  Conus terminates at L1.     Degenerative findings:     L1-L2: Disc is normal in configuration.  There is no facet arthropathy.  There is no neural foraminal stenosis.  There is no spinal canal stenosis.     L2-L3: Disc is normal in configuration.  There is no facet arthropathy.  There is no neural foraminal stenosis.  There is no spinal canal stenosis.     L3-L4: Minimal diffuse disc bulge.  Mild bilateral facet arthropathy.  There is no neural foraminal stenosis.  There is no spinal canal stenosis.     L4-L5: Mild diffuse disc bulge.  Mild bilateral facet arthropathy.  Mild bilateral neural foraminal stenosis.  There is no spinal canal stenosis.     L5-S1: Diffuse disc bulge with superimposed left central/subarticular disc extrusion through an annular fissure, enlarged since the prior study on 08/06/2021, which now descends approximately 11 mm to the S1 pedicular level.  This narrows the left lateral recess and abuts the descending left S1 nerve root.  Mild bilateral facet arthropathy.  Moderate left and mild right neural foraminal stenosis.    Mild spinal canal stenosis.     Paraspinal muscles & soft tissues: No significant abnormalities.      Assessment:  The patient is a 34-year-old woman with a history of cervical DDD who presented in referral from [place]  [facility] for cervical radiculopathy.    1. Lumbar radiculopathy        2. DDD (degenerative disc disease), lumbar        3. Lumbar spondylosis              Plan:  1. We discussed that she has no longer having facet mediated pain following the lumbar radiofrequency ablation and she has a return of radicular symptoms.  We reviewed her lumbar spine MRI and discussed that her pain is due to L5/S1 where she has a disc bulge causing foraminal stenosis but also contacting the descending left S1 nerve.  I will schedule the patient for left L5/S1 and S1 transforaminal epidural steroid injection.  2. Follow-up in 4 weeks postprocedure or sooner as needed.

## 2024-04-12 NOTE — H&P (VIEW-ONLY)
This note was completed with dictation software and grammatical errors may exist.    CC:  Neck pain, back pain    HPI:  The patient is a 34-year-old woman with a history of cervical DDD who presented in referral from [facility] for cervical radiculopathy.  She is status post bilateral L4/5 and L5/S1 medial branch radiofrequency ablation on 01/26/2024 reporting almost complete relief for the 1st 3 and half weeks but then developed radicular symptoms.  She describes left low back pain with intermittent shooting pain into her left leg momentarily.  This is typically in the left posterolateral thigh.  She reports numbness in the left buttock as well.  The pain is sharp.  She drove to [place] and the pain was severe during this time but the pain had started prior to her drive.  She denies weakness at this time.    Prior history: She presents in follow-up for neck pain.  She was sent for direct procedure from to [facility].  She is status post C6/7 interlaminar ZAY on 05/31/2021 with 50% improvement, almost complete improvement of her right arm numbness and tingling however.  She reports that her neck pain began in March, 2021 when she was throwing a medicine ball over her head.  She had sudden pain in the neck, right shoulder and began having numbness and tingling in her right arm.  She reports that pain is aching, numb, sharp, shooting, tingling and deep.  She states that her pain was much worse when having to drive but also with running which she needs to do for her physical fitness test.  She does get some relief with physical therapy, medications including ibuprofen, Mobic and stretching helps.  She does report having chronic migraine headaches, has not seen neurology for this.  In the last year because of her neck pain this seems to have exacerbated her headaches.    Her other complaint is back pain in the mid and low back for the last year.  Particularly in the low back, radiates out to the  lateral hips, sometimes radiates along her posterior thighs into her calves.  This pain is worse with running, also worse with sitting too long and when moving from sitting to standing.  She denies any constant numbness, no bowel or bladder incontinence.    Pain intervention history: She is status post C6/7 interlaminar ZAY on 05/31/2021 with 50% improvement of neck pain, almost 90% improvement of right arm pain.She is status post L5/S1 ZAY on 08/26/2021 with No major benefit.   She is status post left L5/S1 transforaminal epidural steroid injection on 10/21/2021 with 50% relief.   She is status post left L5/S1 transforaminal epidural steroid injection on 12/06/2021 with additional relief for 3 weeks.   She is status post left L5/S1 transforaminal epidural steroid injection on 02/21/2022 with about 50% relief.   She is status post left L5/S1 transforaminal epidural steroid injection on 02/21/2022 with a couple days of relief, now reporting 0% relief.   She is status post bilateral L4/5 and L5/S1 medial branch radiofrequency ablation on 01/26/2024 reporting almost complete relief for the 1st 3 and half weeks but then developed radicular symptoms.      Spine surgeries:None    Antineuropathics:  Gabapentin 600 mg nightly  NSAIDs:  Mobic, ibuprofen both provide 30-50% relief.  Currently taking nabumetone 750 mg twice daily as needed  Physical therapy:  She has gone to [facility] but this did not give lasting relief and she reports having some increased pain afterwards.  She does continue her home exercise program.  Antidepressants:  Muscle relaxers:  Tizanidine 2 mg nightly as needed  Opioids:  Antiplatelets/Anticoagulants:    ROS:  She reports fatigability, headaches and back pain.  Balance of review of systems is negative.    Lab Results   Component Value Date    HGBA1C 5.3 09/25/2023       Lab Results   Component Value Date    WBC 5.21 09/25/2023    HGB 15.0 09/25/2023    HCT 42.0 09/25/2023    MCV 88  09/25/2023     09/25/2023             Past Medical History:   Diagnosis Date    Hyperlipidemia     Migraine     PCOS (polycystic ovarian syndrome)        Past Surgical History:   Procedure Laterality Date    EPIDURAL STEROID INJECTION INTO CERVICAL SPINE N/A 5/31/2021    Procedure: Injection-steroid-epidural-cervical C6/7 spread to the right;  Surgeon: Jigar Gay MD;  Location: [facility] OR;  Service: Pain Management;  Laterality: N/A;    EPIDURAL STEROID INJECTION INTO LUMBAR SPINE N/A 8/26/2021    Procedure: Injection-steroid-epidural-lumbar l5/S1 interlaminar;  Surgeon: Jigar Gay MD;  Location: [facility] OR;  Service: Pain Management;  Laterality: N/A;    EPIDURAL STEROID INJECTION INTO LUMBAR SPINE N/A 11/18/2022    Procedure: Injection-steroid-epidural-lumbar L5/S1;  Surgeon: Jigar Gay MD;  Location: [facility] OR;  Service: Pain Management;  Laterality: N/A;    INJECTION OF ANESTHETIC AGENT AROUND MEDIAL BRANCH NERVES INNERVATING LUMBAR FACET JOINT Bilateral 10/18/2023    Procedure: Block-nerve-medial branch-lumbar Bilat L4/5 and L5/S1;  Surgeon: Jigar Gay MD;  Location: [facility] OR;  Service: Pain Management;  Laterality: Bilateral;  Bilat L4/5 and L5/S1    INJECTION OF ANESTHETIC AGENT AROUND MEDIAL BRANCH NERVES INNERVATING LUMBAR FACET JOINT Bilateral 11/20/2023    Procedure: BLOCK, NERVE, FACET JOINT, LUMBAR, MEDIAL BRANCH Bilat L4/5 and L5/S1;  Surgeon: Jigar Gay MD;  Location: [facility] OR;  Service: Pain Management;  Laterality: Bilateral;    MOUTH SURGERY      RADIOFREQUENCY ABLATION OF LUMBAR MEDIAL BRANCH NERVE AT SINGLE LEVEL Bilateral 1/26/2024    Procedure: Radiofrequency Ablation, Nerve, Spinal, Lumbar, Medial Branch, L4/5 and L5/S1;  Surgeon: Jigar Gay MD;  Location: [facility] OR;  Service: Pain Management;  Laterality: Bilateral;    TRANSFORAMINAL EPIDURAL INJECTION OF STEROID Left 10/21/2021    Procedure: Injection,steroid,epidural,transforaminal  "approach L5/S1;  Surgeon: Jigar Gay MD;  Location: [facility] OR;  Service: Pain Management;  Laterality: Left;    TRANSFORAMINAL EPIDURAL INJECTION OF STEROID Left 12/6/2021    Procedure: Injection,steroid,epidural,transforaminal approach L5/S1;  Surgeon: Jigar Gay MD;  Location: [facility] OR;  Service: Pain Management;  Laterality: Left;    TRANSFORAMINAL EPIDURAL INJECTION OF STEROID Left 2/21/2022    Procedure: Injection,steroid,epidural,transforaminal approach L5/S1;  Surgeon: Jigar Gay MD;  Location: [facility] OR;  Service: Pain Management;  Laterality: Left;    VAGINAL DELIVERY      x3    WISDOM TOOTH EXTRACTION         Social History     Socioeconomic History    Marital status:    Tobacco Use    Smoking status: Never    Smokeless tobacco: Never   Substance and Sexual Activity    Alcohol use: No    Drug use: No    Sexual activity: Yes     Partners: Male         Medications/Allergies: See med card    Vitals:    04/08/24 1340   BP: (!) 149/71   Pulse: 94   Weight: 87 kg (191 lb 12.8 oz)   Height: 5' 8" (1.727 m)   PainSc:   6   PainLoc: Back         4/8/2024     1:41 PM 9/29/2023     2:59 PM 3/31/2022     8:20 AM   Last 3 PDI Scores   Pain Disability Index (PDI) 34 [phone] 42           Physical exam:  Gen: A and O x3, pleasant, well-groomed  Skin: No rashes or obvious lesions  HEENT: PERRLA, no obvious deformities on ears or in canals. Trachea midline.  CVS: Regular rate and rhythm, normal palpable pulses.  Resp:No increased work of breathing, symmetrical chest rise.  Abdomen: Soft, NT/ND.  Musculoskeletal:No antalgic gait.     Neuro:    Iliopsoas Quadriceps Knee  Flexion Tibialis  anterior Gastro- cnemius EHL   Lower: R 5/5 5/5 5/5 5/5 5/5 5/5    L 5/5 5/5 5/5 5/5 5/5 5/5      Left  Right    Patellar DTR 2+ 1+   Achilles DTR 2+ 1+   Munoz Absent  Absent   Clonus Absent Absent   Babinski Absent Absent     Intact and symmetrical to light touch and pinprick in L1-S1 dermatomes " bilaterally.    Lumbar spine:  Lumbar spine:  Range of motion is moderately decreased with both flexion and extension with increased left low back pain during each maneuver.  Frandy's test causes no increased pain on either side.    Supine straight leg raise low back and left leg pain.  Internal and external rotation of the hip causes no increased pain on either side.  Myofascial exam:  Moderate tenderness to palpation to the left lower lumbar paraspinous musculature and left upper buttock.      Imagin21 MRI C-spine:  C2-C3: No disc herniation or significant posterior osseous ridging. No significant spinal canal or foraminal stenosis.   C3-C4: No disc herniation or significant posterior osseous ridging. No significant spinal canal or foraminal stenosis.   C4-C5: Mild disc osteophyte complex.  Slight ventral cord flattening with preserved ventral and dorsal CSF.  No significant foraminal stenosis.   C5-C6: Mild disc osteophyte complex.  Slight ventral cord flattening with preserved ventral and dorsal CSF.  No significant foraminal stenosis.   C6-C7: Mild disc osteophyte complex.  Preserved ventral and dorsal CSF.  No significant foraminal stenosis.   C7-T1: No disc herniation or significant posterior osseous ridging. No significant spinal canal or foraminal stenosis.    21 MRI L-spine:  T12-L1: Unremarkable   L1-2: Unremarkable   L2-3: There is only a minimal disc bulge.  There is no spinal canal or significant foraminal stenosis.   L3-4: There is minimal bulging of the annulus.  There is also mild facet joint arthropathy.  There is flattening of the ventral dural sac.  There is borderline spinal stenosis.  The foramina are patent.   L4-5: There is a mild disc bulge slightly eccentric to the left.  There is mild facet joint arthropathy.  There is flattening of the ventral dural sac.  There is no spinal stenosis.  There is only mild bilateral foraminal stenosis without spinal stenosis.   L5-S1: There is  mild disc space narrowing.  There is a mild disc bulge with superimposed broad left paracentral and proximal foraminal disc protrusion.  There is no spinal stenosis but there is crowding of the left lateral recess which could irritate the left S1 root.  Also, there is mild-to-moderate left foraminal stenosis.    12/27/22 MRI lumbar spine   Alignment: Normal.     Vertebral column: Vertebral body heights are maintained.  No evidence of an acute fracture or aggressive marrow replacement process. Moderate intervertebral disc space narrowing with disc extrusion through an annular fissure at L5-S1.  Remainder of the disc spaces are preserved.     Cord: Normal.  Conus terminates at L1.     Degenerative findings:     L1-L2: Disc is normal in configuration.  There is no facet arthropathy.  There is no neural foraminal stenosis.  There is no spinal canal stenosis.     L2-L3: Disc is normal in configuration.  There is no facet arthropathy.  There is no neural foraminal stenosis.  There is no spinal canal stenosis.     L3-L4: Minimal diffuse disc bulge.  Mild bilateral facet arthropathy.  There is no neural foraminal stenosis.  There is no spinal canal stenosis.     L4-L5: Mild diffuse disc bulge.  Mild bilateral facet arthropathy.  Mild bilateral neural foraminal stenosis.  There is no spinal canal stenosis.     L5-S1: Diffuse disc bulge with superimposed left central/subarticular disc extrusion through an annular fissure, enlarged since the prior study on 08/06/2021, which now descends approximately 11 mm to the S1 pedicular level.  This narrows the left lateral recess and abuts the descending left S1 nerve root.  Mild bilateral facet arthropathy.  Moderate left and mild right neural foraminal stenosis.    Mild spinal canal stenosis.     Paraspinal muscles & soft tissues: No significant abnormalities.      Assessment:  The patient is a 34-year-old woman with a history of cervical DDD who presented in referral from [facility] for cervical radiculopathy.    1. Lumbar radiculopathy        2. DDD (degenerative disc disease), lumbar        3. Lumbar spondylosis              Plan:  1. We discussed that she has no longer having facet mediated pain following the lumbar radiofrequency ablation and she has a return of radicular symptoms.  We reviewed her lumbar spine MRI and discussed that her pain is due to L5/S1 where she has a disc bulge causing foraminal stenosis but also contacting the descending left S1 nerve.  I will schedule the patient for left L5/S1 and S1 transforaminal epidural steroid injection.  2. Follow-up in 4 weeks postprocedure or sooner as needed.

## 2024-05-03 ENCOUNTER — HOSPITAL ENCOUNTER (OUTPATIENT)
Dept: RADIOLOGY | Facility: HOSPITAL | Age: 34
Discharge: HOME | End: 2024-05-03
Attending: ANESTHESIOLOGY | Admitting: ANESTHESIOLOGY
Payer: OTHER GOVERNMENT

## 2024-05-03 ENCOUNTER — HOSPITAL ENCOUNTER (OUTPATIENT)
Facility: HOSPITAL | Age: 34
Discharge: HOME | End: 2024-05-03
Attending: ANESTHESIOLOGY | Admitting: ANESTHESIOLOGY
Payer: OTHER GOVERNMENT

## 2024-05-03 DIAGNOSIS — M54.50 LOWER BACK PAIN: ICD-10-CM

## 2024-05-03 DIAGNOSIS — M54.16 LUMBAR RADICULOPATHY: ICD-10-CM

## 2024-05-03 LAB
B-HCG UR QL: NEGATIVE
CTP QC/QA: YES

## 2024-05-03 PROCEDURE — 76000 FLUOROSCOPY <1 HR PHYS/QHP: CPT | Mod: TC,PO

## 2024-05-03 PROCEDURE — 64483 NJX AA&/STRD TFRM EPI L/S 1: CPT | Mod: LT,,, | Performed by: ANESTHESIOLOGY

## 2024-05-03 PROCEDURE — 63600175 PHARM REV CODE 636 W HCPCS: Mod: PO | Performed by: ANESTHESIOLOGY

## 2024-05-03 PROCEDURE — 25500020 PHARM REV CODE 255: Mod: PO | Performed by: ANESTHESIOLOGY

## 2024-05-03 PROCEDURE — 81025 URINE PREGNANCY TEST: CPT | Mod: PO | Performed by: ANESTHESIOLOGY

## 2024-05-03 PROCEDURE — 25000003 PHARM REV CODE 250: Mod: PO | Performed by: ANESTHESIOLOGY

## 2024-05-03 PROCEDURE — 64483 NJX AA&/STRD TFRM EPI L/S 1: CPT | Mod: PO,LT | Performed by: ANESTHESIOLOGY

## 2024-05-03 RX ORDER — LIDOCAINE HYDROCHLORIDE 10 MG/ML
INJECTION, SOLUTION EPIDURAL; INFILTRATION; INTRACAUDAL; PERINEURAL
Status: DISCONTINUED | OUTPATIENT
Start: 2024-05-03 | End: 2024-05-03 | Stop reason: HOSPADM

## 2024-05-03 RX ORDER — ALPRAZOLAM 0.5 MG/1
1 TABLET, ORALLY DISINTEGRATING ORAL ONCE AS NEEDED
Status: COMPLETED | OUTPATIENT
Start: 2024-05-03 | End: 2024-05-03

## 2024-05-03 RX ORDER — METHYLPREDNISOLONE ACETATE 80 MG/ML
INJECTION, SUSPENSION INTRA-ARTICULAR; INTRALESIONAL; INTRAMUSCULAR; SOFT TISSUE
Status: DISCONTINUED | OUTPATIENT
Start: 2024-05-03 | End: 2024-05-03 | Stop reason: HOSPADM

## 2024-05-03 RX ORDER — BUPIVACAINE HYDROCHLORIDE 2.5 MG/ML
INJECTION, SOLUTION EPIDURAL; INFILTRATION; INTRACAUDAL
Status: DISCONTINUED | OUTPATIENT
Start: 2024-05-03 | End: 2024-05-03 | Stop reason: HOSPADM

## 2024-05-03 RX ADMIN — ALPRAZOLAM 1 MG: 0.5 TABLET, ORALLY DISINTEGRATING ORAL at 02:05

## 2024-05-03 NOTE — DISCHARGE SUMMARY
[facility] - Surgery  Discharge Note  Short Stay    Procedure(s) (LRB):  Injection,steroid,epidural,transforaminal approach    L5/S1 and S1 (Left)      OUTCOME: Patient tolerated treatment/procedure well without complication and is now ready for discharge.    DISPOSITION: Home or Self Care    FINAL DIAGNOSIS:  Lumbar radiculopathy    FOLLOWUP: In clinic    DISCHARGE INSTRUCTIONS:    Discharge Procedure Orders   Diet Adult Regular     No dressing needed     Notify your health care provider if you experience any of the following:  temperature >100.4     Activity as tolerated

## 2024-05-03 NOTE — OP NOTE
PROCEDURE DATE: 5/3/2024    PROCEDURE: Left L5 and S1 transforaminal epidural steroid injection under fluoroscopy    DIAGNOSIS: Lumbar  Radiculopathy    Post op diagnosis: Same    PHYSICIAN: Jigar Gay MD    MEDICATIONS INJECTED:  Methylprednisolone 40mg (1ml) and 1ml 0.25% bupivicaine at each nerve root.     LOCAL ANESTHETIC INJECTED:  Lidocaine 1%. 4 ml per site.    SEDATION MEDICATIONS: none    ESTIMATED BLOOD LOSS:  none    COMPLICATIONS:  none    TECHNIQUE:   A time-out was taken to identify patient and procedure side prior to starting the procedure. The patient was placed in a prone position, prepped and draped in the usual sterile fashion using ChloraPrep and sterile towels.  The area to be injected was determined under fluoroscopic guidance in AP and oblique view.  Local anesthetic was given by raising a wheal and going down to the hub of a 25-gauge 1.5 inch needle.  In oblique view, a 5 inch 22-gauge bent-tip spinal needle was introduced towards 6 oclock position of the pedicle of each above named nerve root level.  The needle was walked medially then hinged into the neural foramen and position was confirmed in AP and lateral views.  Omnipaque contrast dye was injected to confirm appropriate placement and that there was no vascular uptake.  After negative aspiration for blood or CSF, the medication was then injected. This was performed at the right L5 and S1 level(s). The patient tolerated the procedure well.    The patient was monitored after the procedure.  Patient was given post procedure and discharge instructions to follow at home. The patient was discharged in a stable condition.

## 2024-05-06 VITALS
BODY MASS INDEX: 29.1 KG/M2 | DIASTOLIC BLOOD PRESSURE: 69 MMHG | SYSTOLIC BLOOD PRESSURE: 115 MMHG | OXYGEN SATURATION: 100 % | WEIGHT: 192 LBS | HEART RATE: 71 BPM | TEMPERATURE: 98 F | HEIGHT: 68 IN | RESPIRATION RATE: 16 BRPM

## 2024-06-04 RX ORDER — TIZANIDINE 2 MG/1
2 TABLET ORAL EVERY 8 HOURS PRN
Qty: 60 TABLET | Refills: 2 | Status: SHIPPED | OUTPATIENT
Start: 2024-06-04

## 2024-07-08 PROBLEM — E78.5 DYSLIPIDEMIA: Status: ACTIVE | Noted: 2024-01-24

## 2024-07-17 DIAGNOSIS — M47.816 LUMBAR SPONDYLOSIS: ICD-10-CM

## 2024-07-17 DIAGNOSIS — M54.16 LUMBAR RADICULOPATHY: ICD-10-CM

## 2024-07-18 RX ORDER — PREGABALIN 50 MG/1
CAPSULE ORAL
Qty: 30 CAPSULE | Refills: 2 | Status: SHIPPED | OUTPATIENT
Start: 2024-07-18

## 2024-09-06 RX ORDER — TIZANIDINE 2 MG/1
2 TABLET ORAL EVERY 8 HOURS PRN
Qty: 60 TABLET | Refills: 2 | Status: SHIPPED | OUTPATIENT
Start: 2024-09-06

## 2024-10-01 ENCOUNTER — TELEPHONE (OUTPATIENT)
Dept: PAIN MEDICINE | Facility: CLINIC | Age: 34
End: 2024-10-01

## 2024-10-01 ENCOUNTER — OFFICE VISIT (OUTPATIENT)
Dept: PAIN MEDICINE | Facility: CLINIC | Age: 34
End: 2024-10-01
Payer: OTHER GOVERNMENT

## 2024-10-01 VITALS
HEART RATE: 91 BPM | SYSTOLIC BLOOD PRESSURE: 120 MMHG | DIASTOLIC BLOOD PRESSURE: 65 MMHG | HEIGHT: 68 IN | WEIGHT: 196.44 LBS | BODY MASS INDEX: 29.77 KG/M2

## 2024-10-01 DIAGNOSIS — M54.16 LUMBAR RADICULOPATHY: Primary | ICD-10-CM

## 2024-10-01 DIAGNOSIS — M47.816 LUMBAR SPONDYLOSIS: ICD-10-CM

## 2024-10-01 PROCEDURE — 99214 OFFICE O/P EST MOD 30 MIN: CPT | Mod: S$PBB,,, | Performed by: PHYSICIAN ASSISTANT

## 2024-10-01 PROCEDURE — 99213 OFFICE O/P EST LOW 20 MIN: CPT | Mod: PBBFAC,PO | Performed by: PHYSICIAN ASSISTANT

## 2024-10-01 PROCEDURE — 99999 PR PBB SHADOW E&M-EST. PATIENT-LVL III: CPT | Mod: PBBFAC,,, | Performed by: PHYSICIAN ASSISTANT

## 2024-10-01 RX ORDER — ALPRAZOLAM 1 MG/1
1 TABLET, ORALLY DISINTEGRATING ORAL ONCE AS NEEDED
OUTPATIENT
Start: 2024-10-01 | End: 2036-02-28

## 2024-10-01 NOTE — TELEPHONE ENCOUNTER
Physician - Dr Gay    Type of Procedure/Injection - Lumbar Transforaminal Epidural  L5/S1 and S1          Laterality - Left      Anxiolysis- Local      Need to hold medication - Yes      NSAIDs for 2 days    Tirzepatide (Mounjaro) 7 days      Clearance needed - No      Follow up - 4 week

## 2024-10-01 NOTE — PROGRESS NOTES
This note was completed with dictation software and grammatical errors may exist.    CC:  Neck pain, back pain    HPI:  The patient is a 34-year-old woman with a history of cervical DDD who presented in referral from [facility] for cervical radiculopathy.  She is status post left L5/S1 and S1 transforaminal epidural steroid injection on 05/03/2024 with 50-60% relief lasting about 4 and half months.  The pain started to worsen over the last 2 weeks.  It is located in the left low back radiating into the left buttock, left posterolateral thigh.  Pain is worse with activity such as bathing her dogs, cleaning the garage and driving for a long distance.  She does have some numbness in her left buttock but denies any lower extremity weakness.  She would like to repeat the injection and states that this injection gave her more relief than she has had in the past.    Prior history: She presents in follow-up for neck pain.  She was sent for direct procedure from to [facility].  She is status post C6/7 interlaminar ZAY on 05/31/2021 with 50% improvement, almost complete improvement of her right arm numbness and tingling however.  She reports that her neck pain began in March, 2021 when she was throwing a medicine ball over her head.  She had sudden pain in the neck, right shoulder and began having numbness and tingling in her right arm.  She reports that pain is aching, numb, sharp, shooting, tingling and deep.  She states that her pain was much worse when having to drive but also with running which she needs to do for her physical fitness test.  She does get some relief with physical therapy, medications including ibuprofen, Mobic and stretching helps.  She does report having chronic migraine headaches, has not seen neurology for this.  In the last year because of her neck pain this seems to have exacerbated her headaches.    Her other complaint is back pain in the mid and low back for the last year.   Particularly in the low back, radiates out to the lateral hips, sometimes radiates along her posterior thighs into her calves.  This pain is worse with running, also worse with sitting too long and when moving from sitting to standing.  She denies any constant numbness, no bowel or bladder incontinence.    Pain intervention history: She is status post C6/7 interlaminar ZAY on 05/31/2021 with 50% improvement of neck pain, almost 90% improvement of right arm pain.She is status post L5/S1 ZAY on 08/26/2021 with No major benefit.   She is status post left L5/S1 transforaminal epidural steroid injection on 10/21/2021 with 50% relief.   She is status post left L5/S1 transforaminal epidural steroid injection on 12/06/2021 with additional relief for 3 weeks.   She is status post left L5/S1 transforaminal epidural steroid injection on 02/21/2022 with about 50% relief.   She is status post left L5/S1 transforaminal epidural steroid injection on 02/21/2022 with a couple days of relief, now reporting 0% relief.   She is status post bilateral L4/5 and L5/S1 medial branch radiofrequency ablation on 01/26/2024 reporting almost complete relief for the 1st 3 and half weeks but then developed radicular symptoms.    She is status post left L5/S1 and S1 transforaminal epidural steroid injection on 05/03/2024 with 50-60% relief lasting about 4 and half months.      Spine surgeries:None    Antineuropathics:  Gabapentin 600 mg nightly  NSAIDs:  Mobic, ibuprofen both provide 30-50% relief.  Currently taking nabumetone 750 mg twice daily as needed  Physical therapy:  She has gone to [facility] but this did not give lasting relief and she reports having some increased pain afterwards.  She does continue her home exercise program.  Antidepressants:  Muscle relaxers:  Tizanidine 2 mg nightly as needed  Opioids:  Antiplatelets/Anticoagulants:    ROS:  She reports fatigability, headaches and back pain.  Balance of review of systems  is negative.    Lab Results   Component Value Date    HGBA1C 5.3 09/25/2023       Lab Results   Component Value Date    WBC 5.21 09/25/2023    HGB 15.0 09/25/2023    HCT 42.0 09/25/2023    MCV 88 09/25/2023     09/25/2023             Past Medical History:   Diagnosis Date    Hyperlipidemia     Migraine     PCOS (polycystic ovarian syndrome)        Past Surgical History:   Procedure Laterality Date    EPIDURAL STEROID INJECTION INTO CERVICAL SPINE N/A 5/31/2021    Procedure: Injection-steroid-epidural-cervical C6/7 spread to the right;  Surgeon: Jigar Gay MD;  Location: [facility] OR;  Service: Pain Management;  Laterality: N/A;    EPIDURAL STEROID INJECTION INTO LUMBAR SPINE N/A 8/26/2021    Procedure: Injection-steroid-epidural-lumbar l5/S1 interlaminar;  Surgeon: Jigar Gay MD;  Location: [facility] OR;  Service: Pain Management;  Laterality: N/A;    EPIDURAL STEROID INJECTION INTO LUMBAR SPINE N/A 11/18/2022    Procedure: Injection-steroid-epidural-lumbar L5/S1;  Surgeon: Jigar Gay MD;  Location: [facility] OR;  Service: Pain Management;  Laterality: N/A;    INJECTION OF ANESTHETIC AGENT AROUND MEDIAL BRANCH NERVES INNERVATING LUMBAR FACET JOINT Bilateral 10/18/2023    Procedure: Block-nerve-medial branch-lumbar Bilat L4/5 and L5/S1;  Surgeon: Jigar Gay MD;  Location: [facility] OR;  Service: Pain Management;  Laterality: Bilateral;  Bilat L4/5 and L5/S1    INJECTION OF ANESTHETIC AGENT AROUND MEDIAL BRANCH NERVES INNERVATING LUMBAR FACET JOINT Bilateral 11/20/2023    Procedure: BLOCK, NERVE, FACET JOINT, LUMBAR, MEDIAL BRANCH Bilat L4/5 and L5/S1;  Surgeon: Jigar Gay MD;  Location: [facility] OR;  Service: Pain Management;  Laterality: Bilateral;    MOUTH SURGERY      RADIOFREQUENCY ABLATION OF LUMBAR MEDIAL BRANCH NERVE AT SINGLE LEVEL Bilateral 1/26/2024    Procedure: Radiofrequency Ablation, Nerve, Spinal, Lumbar, Medial Branch, L4/5 and L5/S1;  Surgeon: Jigar Gay MD;   "Location: [facility] OR;  Service: Pain Management;  Laterality: Bilateral;    TRANSFORAMINAL EPIDURAL INJECTION OF STEROID Left 10/21/2021    Procedure: Injection,steroid,epidural,transforaminal approach L5/S1;  Surgeon: Jigar Gay MD;  Location: [facility] OR;  Service: Pain Management;  Laterality: Left;    TRANSFORAMINAL EPIDURAL INJECTION OF STEROID Left 12/6/2021    Procedure: Injection,steroid,epidural,transforaminal approach L5/S1;  Surgeon: Jigar Gay MD;  Location: [facility] OR;  Service: Pain Management;  Laterality: Left;    TRANSFORAMINAL EPIDURAL INJECTION OF STEROID Left 2/21/2022    Procedure: Injection,steroid,epidural,transforaminal approach L5/S1;  Surgeon: Jigar Gay MD;  Location: [facility] OR;  Service: Pain Management;  Laterality: Left;    TRANSFORAMINAL EPIDURAL INJECTION OF STEROID Left 5/3/2024    Procedure: Injection,steroid,epidural,transforaminal approach    L5/S1 and S1;  Surgeon: Jigar Gay MD;  Location: [facility] OR;  Service: Pain Management;  Laterality: Left;    VAGINAL DELIVERY      x3    WISDOM TOOTH EXTRACTION         Social History     Socioeconomic History    Marital status:    Tobacco Use    Smoking status: Never    Smokeless tobacco: Never   Substance and Sexual Activity    Alcohol use: No    Drug use: No    Sexual activity: Yes     Partners: Male         Medications/Allergies: See med card    Vitals:    10/01/24 0820   BP: 120/65   Pulse: 91   Weight: 89.1 kg (196 lb 6.9 oz)   Height: 5' 8" (1.727 m)   PainSc:   3   PainLoc: Back         10/1/2024     8:20 AM 4/8/2024     1:41 PM 9/29/2023     2:59 PM   Last 3 PDI Scores   Pain Disability Index (PDI) 32 [phone] 54           Physical exam:  Gen: A and O x3, pleasant, well-groomed  Skin: No rashes or obvious lesions  HEENT: PERRLA, no obvious deformities on ears or in canals. Trachea midline.  CVS: Regular rate and rhythm, normal palpable pulses.  Resp:No increased work of breathing, symmetrical chest " rise.  Abdomen: Soft, NT/ND.  Musculoskeletal:No antalgic gait.     Neuro:    Iliopsoas Quadriceps Knee  Flexion Tibialis  anterior Gastro- cnemius EHL   Lower: R 5/5 5/5 5/5 5/5 5/5 5/5    L 5/5 5/5 5/5 5/5 5/5 5/5      Left  Right    Patellar DTR 2+ 1+   Achilles DTR 2+ 1+   Munoz Absent  Absent   Clonus Absent Absent   Babinski Absent Absent     Intact and symmetrical to light touch and pinprick in L1-S1 dermatomes bilaterally.    Lumbar spine:  Lumbar spine:  Range of motion is moderately decreased with both flexion and extension with increased left low back pain during each maneuver.  Frandy's test causes no increased pain on either side.    Supine straight leg raise low back and left leg pain.  Internal and external rotation of the hip causes no increased pain on either side.  Myofascial exam:  Moderate tenderness to palpation to the left lower lumbar paraspinous musculature and left upper buttock.      Imagin21 MRI C-spine:  C2-C3: No disc herniation or significant posterior osseous ridging. No significant spinal canal or foraminal stenosis.   C3-C4: No disc herniation or significant posterior osseous ridging. No significant spinal canal or foraminal stenosis.   C4-C5: Mild disc osteophyte complex.  Slight ventral cord flattening with preserved ventral and dorsal CSF.  No significant foraminal stenosis.   C5-C6: Mild disc osteophyte complex.  Slight ventral cord flattening with preserved ventral and dorsal CSF.  No significant foraminal stenosis.   C6-C7: Mild disc osteophyte complex.  Preserved ventral and dorsal CSF.  No significant foraminal stenosis.   C7-T1: No disc herniation or significant posterior osseous ridging. No significant spinal canal or foraminal stenosis.    21 MRI L-spine:  T12-L1: Unremarkable   L1-2: Unremarkable   L2-3: There is only a minimal disc bulge.  There is no spinal canal or significant foraminal stenosis.   L3-4: There is minimal bulging of the annulus.  There  is also mild facet joint arthropathy.  There is flattening of the ventral dural sac.  There is borderline spinal stenosis.  The foramina are patent.   L4-5: There is a mild disc bulge slightly eccentric to the left.  There is mild facet joint arthropathy.  There is flattening of the ventral dural sac.  There is no spinal stenosis.  There is only mild bilateral foraminal stenosis without spinal stenosis.   L5-S1: There is mild disc space narrowing.  There is a mild disc bulge with superimposed broad left paracentral and proximal foraminal disc protrusion.  There is no spinal stenosis but there is crowding of the left lateral recess which could irritate the left S1 root.  Also, there is mild-to-moderate left foraminal stenosis.    12/27/22 MRI lumbar spine   Alignment: Normal.     Vertebral column: Vertebral body heights are maintained.  No evidence of an acute fracture or aggressive marrow replacement process. Moderate intervertebral disc space narrowing with disc extrusion through an annular fissure at L5-S1.  Remainder of the disc spaces are preserved.     Cord: Normal.  Conus terminates at L1.     Degenerative findings:     L1-L2: Disc is normal in configuration.  There is no facet arthropathy.  There is no neural foraminal stenosis.  There is no spinal canal stenosis.     L2-L3: Disc is normal in configuration.  There is no facet arthropathy.  There is no neural foraminal stenosis.  There is no spinal canal stenosis.     L3-L4: Minimal diffuse disc bulge.  Mild bilateral facet arthropathy.  There is no neural foraminal stenosis.  There is no spinal canal stenosis.     L4-L5: Mild diffuse disc bulge.  Mild bilateral facet arthropathy.  Mild bilateral neural foraminal stenosis.  There is no spinal canal stenosis.     L5-S1: Diffuse disc bulge with superimposed left central/subarticular disc extrusion through an annular fissure, enlarged since the prior study on 08/06/2021, which now descends approximately 11 mm to  the S1 pedicular level.  This narrows the left lateral recess and abuts the descending left S1 nerve root.  Mild bilateral facet arthropathy.  Moderate left and mild right neural foraminal stenosis.    Mild spinal canal stenosis.     Paraspinal muscles & soft tissues: No significant abnormalities.      Assessment:  The patient is a 34-year-old woman with a history of cervical DDD who presented in referral from [facility] for cervical radiculopathy.    1. Lumbar radiculopathy        2. Lumbar spondylosis              Plan:  1. I will schedule patient to repeat a left L5/S1 and S1 transforaminal epidural steroid injection.  She had about 4 and half months of greater than 50% relief with her last injection.    2. Continue home exercise program.    3. Follow-up in 4 weeks postprocedure or sooner as needed.

## 2024-10-01 NOTE — TELEPHONE ENCOUNTER
Spoke with pt, pt picked a date 10/23/24. pre-procedure instructions given: Hold NSAID's 2 days prior to procedure and hold Mounjaro injection 7 days prior to procedure. Patient stated she understood instructions.

## 2024-10-16 DIAGNOSIS — M54.16 LUMBAR RADICULOPATHY: ICD-10-CM

## 2024-10-16 DIAGNOSIS — M47.816 LUMBAR SPONDYLOSIS: ICD-10-CM

## 2024-10-17 RX ORDER — PREGABALIN 50 MG/1
CAPSULE ORAL
Qty: 30 CAPSULE | Refills: 2 | Status: SHIPPED | OUTPATIENT
Start: 2024-10-17

## 2024-10-23 ENCOUNTER — HOSPITAL ENCOUNTER (OUTPATIENT)
Facility: HOSPITAL | Age: 34
Discharge: HOME | End: 2024-10-23
Attending: ANESTHESIOLOGY | Admitting: ANESTHESIOLOGY
Payer: OTHER GOVERNMENT

## 2024-10-23 ENCOUNTER — HOSPITAL ENCOUNTER (OUTPATIENT)
Dept: RADIOLOGY | Facility: HOSPITAL | Age: 34
Discharge: HOME | End: 2024-10-23
Attending: ANESTHESIOLOGY | Admitting: ANESTHESIOLOGY
Payer: OTHER GOVERNMENT

## 2024-10-23 VITALS
OXYGEN SATURATION: 100 % | SYSTOLIC BLOOD PRESSURE: 117 MMHG | BODY MASS INDEX: 29.7 KG/M2 | RESPIRATION RATE: 16 BRPM | HEIGHT: 68 IN | HEART RATE: 90 BPM | DIASTOLIC BLOOD PRESSURE: 58 MMHG | TEMPERATURE: 97 F | WEIGHT: 196 LBS

## 2024-10-23 DIAGNOSIS — M54.50 LOWER BACK PAIN: ICD-10-CM

## 2024-10-23 DIAGNOSIS — M54.16 LUMBAR RADICULOPATHY: ICD-10-CM

## 2024-10-23 LAB
B-HCG UR QL: NEGATIVE
CTP QC/QA: YES

## 2024-10-23 PROCEDURE — 81025 URINE PREGNANCY TEST: CPT | Mod: PO | Performed by: ANESTHESIOLOGY

## 2024-10-23 PROCEDURE — 25000003 PHARM REV CODE 250: Mod: PO | Performed by: ANESTHESIOLOGY

## 2024-10-23 PROCEDURE — 64483 NJX AA&/STRD TFRM EPI L/S 1: CPT | Mod: PO,LT | Performed by: ANESTHESIOLOGY

## 2024-10-23 PROCEDURE — 63600175 PHARM REV CODE 636 W HCPCS: Mod: JZ,JG,PO | Performed by: ANESTHESIOLOGY

## 2024-10-23 PROCEDURE — 64483 NJX AA&/STRD TFRM EPI L/S 1: CPT | Mod: LT,,, | Performed by: ANESTHESIOLOGY

## 2024-10-23 PROCEDURE — 64484 NJX AA&/STRD TFRM EPI L/S EA: CPT | Mod: PO,LT | Performed by: ANESTHESIOLOGY

## 2024-10-23 PROCEDURE — 64484 NJX AA&/STRD TFRM EPI L/S EA: CPT | Mod: LT,,, | Performed by: ANESTHESIOLOGY

## 2024-10-23 RX ORDER — LIDOCAINE HYDROCHLORIDE 10 MG/ML
INJECTION, SOLUTION EPIDURAL; INFILTRATION; INTRACAUDAL; PERINEURAL
Status: DISCONTINUED | OUTPATIENT
Start: 2024-10-23 | End: 2024-10-23 | Stop reason: HOSPADM

## 2024-10-23 RX ORDER — ALPRAZOLAM 0.5 MG/1
1 TABLET, ORALLY DISINTEGRATING ORAL ONCE AS NEEDED
Status: COMPLETED | OUTPATIENT
Start: 2024-10-23 | End: 2024-10-23

## 2024-10-23 RX ORDER — BUPIVACAINE HYDROCHLORIDE 2.5 MG/ML
INJECTION, SOLUTION EPIDURAL; INFILTRATION; INTRACAUDAL
Status: DISCONTINUED | OUTPATIENT
Start: 2024-10-23 | End: 2024-10-23 | Stop reason: HOSPADM

## 2024-10-23 RX ORDER — METHYLPREDNISOLONE ACETATE 80 MG/ML
INJECTION, SUSPENSION INTRA-ARTICULAR; INTRALESIONAL; INTRAMUSCULAR; SOFT TISSUE
Status: DISCONTINUED | OUTPATIENT
Start: 2024-10-23 | End: 2024-10-23 | Stop reason: HOSPADM

## 2024-10-23 RX ADMIN — ALPRAZOLAM 1 MG: 0.5 TABLET, ORALLY DISINTEGRATING ORAL at 03:10

## 2024-10-24 NOTE — H&P
CC: Back pain    HPI: The patient is a 32yo woman with a history of lumbar spondylosis here for bilateral L4/5 and L5/S1 medial branch block. There are no major changes in history and physical from 9/29/23.    Past Medical History:   Diagnosis Date    Hyperlipidemia     Migraine     PCOS (polycystic ovarian syndrome)        Past Surgical History:   Procedure Laterality Date    EPIDURAL STEROID INJECTION INTO CERVICAL SPINE N/A 5/31/2021    Procedure: Injection-steroid-epidural-cervical C6/7 spread to the right;  Surgeon: Jigar Gay MD;  Location: [facility] OR;  Service: Pain Management;  Laterality: N/A;    EPIDURAL STEROID INJECTION INTO LUMBAR SPINE N/A 8/26/2021    Procedure: Injection-steroid-epidural-lumbar l5/S1 interlaminar;  Surgeon: Jigar Gay MD;  Location: [facility] OR;  Service: Pain Management;  Laterality: N/A;    EPIDURAL STEROID INJECTION INTO LUMBAR SPINE N/A 11/18/2022    Procedure: Injection-steroid-epidural-lumbar L5/S1;  Surgeon: Jigar Gay MD;  Location: [facility] OR;  Service: Pain Management;  Laterality: N/A;    INJECTION OF ANESTHETIC AGENT AROUND MEDIAL BRANCH NERVES INNERVATING LUMBAR FACET JOINT Bilateral 10/18/2023    Procedure: Block-nerve-medial branch-lumbar Bilat L4/5 and L5/S1;  Surgeon: Jigar Gay MD;  Location: [facility] OR;  Service: Pain Management;  Laterality: Bilateral;  Bilat L4/5 and L5/S1    MOUTH SURGERY      TRANSFORAMINAL EPIDURAL INJECTION OF STEROID Left 10/21/2021    Procedure: Injection,steroid,epidural,transforaminal approach L5/S1;  Surgeon: Jigar Gay MD;  Location: [facility] OR;  Service: Pain Management;  Laterality: Left;    TRANSFORAMINAL EPIDURAL INJECTION OF STEROID Left 12/6/2021    Procedure: Injection,steroid,epidural,transforaminal approach L5/S1;  Surgeon: Jigar Gay MD;  Location: [facility] OR;  Service: Pain Management;  Laterality: Left;    TRANSFORAMINAL EPIDURAL INJECTION OF STEROID Left 2/21/2022    Procedure:  "Injection,steroid,epidural,transforaminal approach L5/S1;  Surgeon: Jigar Gay MD;  Location: [facility] OR;  Service: Pain Management;  Laterality: Left;    VAGINAL DELIVERY      x3    WISDOM TOOTH EXTRACTION         Family History   Problem Relation Age of Onset    Cancer Father        Social History     Socioeconomic History    Marital status:    Tobacco Use    Smoking status: Never    Smokeless tobacco: Never   Substance and Sexual Activity    Alcohol use: No    Drug use: No    Sexual activity: Yes     Partners: Male       Current Facility-Administered Medications   Medication Dose Route Frequency Provider Last Rate Last Admin    lactated ringers infusion   Intravenous Continuous Jigar Gay MD           Review of patient's allergies indicates:   Allergen Reactions    No known drug allergies        Vitals:    11/15/23 1524 11/20/23 1244   BP:  114/71   Pulse:  85   Resp:  15   Temp:  97.8 °F (36.6 °C)   TempSrc:  Skin   SpO2:  97%   Weight: 82.1 kg (181 lb)    Height: 5' 7" (1.702 m)        ASA 2, Mallampati 2    REVIEW OF SYSTEMS:     GENERAL: No weight loss, malaise or fevers.  HEENT:  No recent changes in vision or hearing  NECK: Negative for lumps, no difficulty with swallowing.  RESPIRATORY: Negative for cough, wheezing or shortness of breath, patient denies any recent URI.  CARDIOVASCULAR: Negative for chest pain, leg swelling or palpitations.  GI: Negative for abdominal discomfort, blood in stools or black stools or change in bowel habits.  MUSCULOSKELETAL: See HPI.  SKIN: Negative for lesions, rash, and itching.  PSYCH: No suicidal or homicidal ideations, no current mood disturbances.  HEMATOLOGY/LYMPHOLOGY: Negative for prolonged bleeding, bruising easily or swollen nodes. Patient is not currently taking any anti-coagulants  ENDO: No history of diabetes or thyroid dysfunction  NEURO: No history of syncope, paralysis, seizures or tremors.All other reviewed and negative other than " HPI.    Physical exam:  Gen: A and O x3, pleasant, well-groomed  Skin: No rashes or obvious lesions  HEENT: PERRLA, no obvious deformities on ears or in canals. No thyroid masses, trachea midline, no palpable lymph nodes in neck, axilla.  CVS: Regular rate and rhythm, normal S1 and S2, no murmurs.  Resp: Clear to auscultation bilaterally.  Abdomen: Soft, NT/ND, normal bowel sounds present.  Musculoskeletal/Neuro: Moving all extremities    Assessment:  Lumbar spondylosis  -     Case Request Operating Room: Radiofrequency Ablation, Nerve, Spinal, Lumbar, Medial Branch, L4/5 & L5/S1  -     Vital signs; Standing  -     Place 18-22 [facility] IV ; Standing  -     Verify informed consent; Standing  -     Notify physician ; Standing  -     Notify physician ; Standing  -     Notify physician (specify); Standing  -     Diet NPO; Standing  -     lactated ringers infusion  -     Place in Outpatient; Standing    Other orders  -     IP VTE LOW RISK PATIENT; Standing  -     POCT urine pregnancy; Standing           PAST SURGICAL HISTORY:  No significant past surgical history

## 2024-11-20 RX ORDER — TIZANIDINE 2 MG/1
2 TABLET ORAL EVERY 8 HOURS PRN
Qty: 60 TABLET | Refills: 17 | Status: SHIPPED | OUTPATIENT
Start: 2024-11-20

## 2024-12-09 PROBLEM — J45.909 ASTHMA DUE TO SEASONAL ALLERGIES: Status: ACTIVE | Noted: 2024-12-09

## 2024-12-09 PROBLEM — E66.811 OBESITY (BMI 30.0-34.9): Status: ACTIVE | Noted: 2024-12-09

## 2024-12-09 PROBLEM — F32.A ANXIETY AND DEPRESSION: Status: ACTIVE | Noted: 2024-12-09

## 2024-12-09 PROBLEM — F41.9 ANXIETY AND DEPRESSION: Status: ACTIVE | Noted: 2024-12-09

## 2024-12-12 ENCOUNTER — OFFICE VISIT (OUTPATIENT)
Dept: PAIN MEDICINE | Facility: CLINIC | Age: 34
End: 2024-12-12
Payer: OTHER GOVERNMENT

## 2024-12-12 VITALS
DIASTOLIC BLOOD PRESSURE: 70 MMHG | WEIGHT: 187.5 LBS | HEIGHT: 68 IN | SYSTOLIC BLOOD PRESSURE: 116 MMHG | HEART RATE: 85 BPM | BODY MASS INDEX: 28.42 KG/M2

## 2024-12-12 DIAGNOSIS — M47.816 LUMBAR SPONDYLOSIS: ICD-10-CM

## 2024-12-12 DIAGNOSIS — G43.809 OTHER MIGRAINE WITHOUT STATUS MIGRAINOSUS, NOT INTRACTABLE: ICD-10-CM

## 2024-12-12 DIAGNOSIS — M54.16 LUMBAR RADICULOPATHY: Primary | ICD-10-CM

## 2024-12-12 PROCEDURE — 99213 OFFICE O/P EST LOW 20 MIN: CPT | Mod: S$PBB,,, | Performed by: PHYSICIAN ASSISTANT

## 2024-12-12 PROCEDURE — 99999 PR PBB SHADOW E&M-EST. PATIENT-LVL IV: CPT | Mod: PBBFAC,,, | Performed by: PHYSICIAN ASSISTANT

## 2024-12-12 PROCEDURE — 99214 OFFICE O/P EST MOD 30 MIN: CPT | Mod: PBBFAC,PO | Performed by: PHYSICIAN ASSISTANT

## 2024-12-12 NOTE — PROGRESS NOTES
This note was completed with dictation software and grammatical errors may exist.    CC:  Neck pain, back pain    HPI:  The patient is a 34-year-old woman with a history of cervical DDD who presented in referral from [facility] for cervical radiculopathy.  She is status post left L5/S1 and S1 transforaminal epidural steroid injection on 10/23/2024 with 50% relief.  She reports that her remaining pain is tolerable.  She has some spikes of pain but not nearly as severe as it was prior to the injection.  She does report some new pain on the right side and also some numbness but this is currently tolerable as well.  She has a history of migraine headaches which had improved for a period of time.  However, over the past couple of months these have been worse.  She just got over a headache that lasted 3 days.      Prior history: She presents in follow-up for neck pain.  She was sent for direct procedure from to [facility].  She is status post C6/7 interlaminar ZAY on 05/31/2021 with 50% improvement, almost complete improvement of her right arm numbness and tingling however.  She reports that her neck pain began in March, 2021 when she was throwing a medicine ball over her head.  She had sudden pain in the neck, right shoulder and began having numbness and tingling in her right arm.  She reports that pain is aching, numb, sharp, shooting, tingling and deep.  She states that her pain was much worse when having to drive but also with running which she needs to do for her physical fitness test.  She does get some relief with physical therapy, medications including ibuprofen, Mobic and stretching helps.  She does report having chronic migraine headaches, has not seen neurology for this.  In the last year because of her neck pain this seems to have exacerbated her headaches.    Her other complaint is back pain in the mid and low back for the last year.  Particularly in the low back, radiates out to the  lateral hips, sometimes radiates along her posterior thighs into her calves.  This pain is worse with running, also worse with sitting too long and when moving from sitting to standing.  She denies any constant numbness, no bowel or bladder incontinence.    Pain intervention history: She is status post C6/7 interlaminar ZAY on 05/31/2021 with 50% improvement of neck pain, almost 90% improvement of right arm pain.She is status post L5/S1 ZAY on 08/26/2021 with No major benefit.   She is status post left L5/S1 transforaminal epidural steroid injection on 10/21/2021 with 50% relief.   She is status post left L5/S1 transforaminal epidural steroid injection on 12/06/2021 with additional relief for 3 weeks.   She is status post left L5/S1 transforaminal epidural steroid injection on 02/21/2022 with about 50% relief.   She is status post left L5/S1 transforaminal epidural steroid injection on 02/21/2022 with a couple days of relief, now reporting 0% relief.   She is status post bilateral L4/5 and L5/S1 medial branch radiofrequency ablation on 01/26/2024 reporting almost complete relief for the 1st 3 and half weeks but then developed radicular symptoms.    She is status post left L5/S1 and S1 transforaminal epidural steroid injection on 05/03/2024 with 50-60% relief lasting about 4 and half months.      Spine surgeries:None    Antineuropathics:  Gabapentin 600 mg nightly  NSAIDs:  Mobic, ibuprofen both provide 30-50% relief.  Currently taking nabumetone 750 mg twice daily as needed  Physical therapy:  She has gone to [facility] but this did not give lasting relief and she reports having some increased pain afterwards.  She does continue her home exercise program.  Antidepressants:  Muscle relaxers:  Tizanidine 2 mg nightly as needed  Opioids:  Antiplatelets/Anticoagulants:    ROS:  She reports fatigability, headaches and back pain.  Balance of review of systems is negative.    Lab Results   Component Value Date     HGBA1C 5.3 09/25/2023       Lab Results   Component Value Date    WBC 5.21 09/25/2023    HGB 15.0 09/25/2023    HCT 42.0 09/25/2023    MCV 88 09/25/2023     09/25/2023             Past Medical History:   Diagnosis Date    Migraine     PCOS (polycystic ovarian syndrome)        Past Surgical History:   Procedure Laterality Date    EPIDURAL STEROID INJECTION INTO CERVICAL SPINE N/A 5/31/2021    Procedure: Injection-steroid-epidural-cervical C6/7 spread to the right;  Surgeon: Jigar Gay MD;  Location: [facility] OR;  Service: Pain Management;  Laterality: N/A;    EPIDURAL STEROID INJECTION INTO LUMBAR SPINE N/A 8/26/2021    Procedure: Injection-steroid-epidural-lumbar l5/S1 interlaminar;  Surgeon: Jigar Gay MD;  Location: [facility] OR;  Service: Pain Management;  Laterality: N/A;    EPIDURAL STEROID INJECTION INTO LUMBAR SPINE N/A 11/18/2022    Procedure: Injection-steroid-epidural-lumbar L5/S1;  Surgeon: Jigar Gay MD;  Location: [facility] OR;  Service: Pain Management;  Laterality: N/A;    INJECTION OF ANESTHETIC AGENT AROUND MEDIAL BRANCH NERVES INNERVATING LUMBAR FACET JOINT Bilateral 10/18/2023    Procedure: Block-nerve-medial branch-lumbar Bilat L4/5 and L5/S1;  Surgeon: Jigar Gay MD;  Location: [facility] OR;  Service: Pain Management;  Laterality: Bilateral;  Bilat L4/5 and L5/S1    INJECTION OF ANESTHETIC AGENT AROUND MEDIAL BRANCH NERVES INNERVATING LUMBAR FACET JOINT Bilateral 11/20/2023    Procedure: BLOCK, NERVE, FACET JOINT, LUMBAR, MEDIAL BRANCH Bilat L4/5 and L5/S1;  Surgeon: Jigar Gay MD;  Location: [facility] OR;  Service: Pain Management;  Laterality: Bilateral;    MOUTH SURGERY      RADIOFREQUENCY ABLATION OF LUMBAR MEDIAL BRANCH NERVE AT SINGLE LEVEL Bilateral 1/26/2024    Procedure: Radiofrequency Ablation, Nerve, Spinal, Lumbar, Medial Branch, L4/5 and L5/S1;  Surgeon: Jigar Gay MD;  Location: [facility] OR;  Service: Pain Management;  Laterality: Bilateral;  "   TRANSFORAMINAL EPIDURAL INJECTION OF STEROID Left 10/21/2021    Procedure: Injection,steroid,epidural,transforaminal approach L5/S1;  Surgeon: Jigar Gay MD;  Location: [facility] OR;  Service: Pain Management;  Laterality: Left;    TRANSFORAMINAL EPIDURAL INJECTION OF STEROID Left 12/6/2021    Procedure: Injection,steroid,epidural,transforaminal approach L5/S1;  Surgeon: Jigar Gay MD;  Location: [facility] OR;  Service: Pain Management;  Laterality: Left;    TRANSFORAMINAL EPIDURAL INJECTION OF STEROID Left 2/21/2022    Procedure: Injection,steroid,epidural,transforaminal approach L5/S1;  Surgeon: Jigar Gay MD;  Location: [facility] OR;  Service: Pain Management;  Laterality: Left;    TRANSFORAMINAL EPIDURAL INJECTION OF STEROID Left 5/3/2024    Procedure: Injection,steroid,epidural,transforaminal approach    L5/S1 and S1;  Surgeon: Jigar Gay MD;  Location: [facility] OR;  Service: Pain Management;  Laterality: Left;    TRANSFORAMINAL EPIDURAL INJECTION OF STEROID Left 10/23/2024    Procedure: Injection,steroid,epidural,transforaminal approach L5/S1 and S1;  Surgeon: Jigar Gay MD;  Location: [facility] OR;  Service: Pain Management;  Laterality: Left;    VAGINAL DELIVERY      x3    WISDOM TOOTH EXTRACTION         Social History     Socioeconomic History    Marital status:    Tobacco Use    Smoking status: Never    Smokeless tobacco: Never   Substance and Sexual Activity    Alcohol use: No    Drug use: No    Sexual activity: Yes     Partners: Male         Medications/Allergies: See med card    Vitals:    12/12/24 0822   BP: 116/70   Pulse: 85   Weight: 85.1 kg (187 lb 8 oz)   Height: 5' 8" (1.727 m)   PainSc:   3   PainLoc: Back         12/12/2024     8:20 AM 10/1/2024     8:20 AM 4/8/2024     1:41 PM   Last 3 PDI Scores   Pain Disability Index (PDI) 28 32 34           Physical exam:  Gen: A and O x3, pleasant, well-groomed  Skin: No rashes or obvious lesions  HEENT: PERRLA, no obvious " deformities on ears or in canals. Trachea midline.  CVS: Regular rate and rhythm, normal palpable pulses.  Resp:No increased work of breathing, symmetrical chest rise.  Abdomen: Soft, NT/ND.  Musculoskeletal:No antalgic gait.     Neuro:    Iliopsoas Quadriceps Knee  Flexion Tibialis  anterior Gastro- cnemius EHL   Lower: R 5/5 5/5 5/5 5/5 5/5 5/5    L 5/5 5/5 5/5 5/5 5/5 5/5      Left  Right    Patellar DTR 2+ 1+   Achilles DTR 2+ 1+   Munoz Absent  Absent   Clonus Absent Absent   Babinski Absent Absent     Intact and symmetrical to light touch and pinprick in L1-S1 dermatomes bilaterally.    Lumbar spine:  Lumbar spine:  Range of motion is moderately decreased with both flexion and extension with increased left low back pain during each maneuver.  Frandy's test causes no increased pain on either side.    Supine straight leg raise low back and left leg pain.  Internal and external rotation of the hip causes no increased pain on either side.  Myofascial exam:  Moderate tenderness to palpation to the left lower lumbar paraspinous musculature and left upper buttock.      Imagin21 MRI C-spine:  C2-C3: No disc herniation or significant posterior osseous ridging. No significant spinal canal or foraminal stenosis.   C3-C4: No disc herniation or significant posterior osseous ridging. No significant spinal canal or foraminal stenosis.   C4-C5: Mild disc osteophyte complex.  Slight ventral cord flattening with preserved ventral and dorsal CSF.  No significant foraminal stenosis.   C5-C6: Mild disc osteophyte complex.  Slight ventral cord flattening with preserved ventral and dorsal CSF.  No significant foraminal stenosis.   C6-C7: Mild disc osteophyte complex.  Preserved ventral and dorsal CSF.  No significant foraminal stenosis.   C7-T1: No disc herniation or significant posterior osseous ridging. No significant spinal canal or foraminal stenosis.    21 MRI L-spine:  T12-L1: Unremarkable   L1-2:  Unremarkable   L2-3: There is only a minimal disc bulge.  There is no spinal canal or significant foraminal stenosis.   L3-4: There is minimal bulging of the annulus.  There is also mild facet joint arthropathy.  There is flattening of the ventral dural sac.  There is borderline spinal stenosis.  The foramina are patent.   L4-5: There is a mild disc bulge slightly eccentric to the left.  There is mild facet joint arthropathy.  There is flattening of the ventral dural sac.  There is no spinal stenosis.  There is only mild bilateral foraminal stenosis without spinal stenosis.   L5-S1: There is mild disc space narrowing.  There is a mild disc bulge with superimposed broad left paracentral and proximal foraminal disc protrusion.  There is no spinal stenosis but there is crowding of the left lateral recess which could irritate the left S1 root.  Also, there is mild-to-moderate left foraminal stenosis.    12/27/22 MRI lumbar spine   Alignment: Normal.     Vertebral column: Vertebral body heights are maintained.  No evidence of an acute fracture or aggressive marrow replacement process. Moderate intervertebral disc space narrowing with disc extrusion through an annular fissure at L5-S1.  Remainder of the disc spaces are preserved.     Cord: Normal.  Conus terminates at L1.     Degenerative findings:     L1-L2: Disc is normal in configuration.  There is no facet arthropathy.  There is no neural foraminal stenosis.  There is no spinal canal stenosis.     L2-L3: Disc is normal in configuration.  There is no facet arthropathy.  There is no neural foraminal stenosis.  There is no spinal canal stenosis.     L3-L4: Minimal diffuse disc bulge.  Mild bilateral facet arthropathy.  There is no neural foraminal stenosis.  There is no spinal canal stenosis.     L4-L5: Mild diffuse disc bulge.  Mild bilateral facet arthropathy.  Mild bilateral neural foraminal stenosis.  There is no spinal canal stenosis.     L5-S1: Diffuse disc bulge  with superimposed left central/subarticular disc extrusion through an annular fissure, enlarged since the prior study on 08/06/2021, which now descends approximately 11 mm to the S1 pedicular level.  This narrows the left lateral recess and abuts the descending left S1 nerve root.  Mild bilateral facet arthropathy.  Moderate left and mild right neural foraminal stenosis.    Mild spinal canal stenosis.     Paraspinal muscles & soft tissues: No significant abnormalities.      Assessment:  The patient is a 34-year-old woman with a history of cervical DDD who presented in referral from [facility] for cervical radiculopathy.    1. Lumbar radiculopathy        2. Lumbar spondylosis        3. Other migraine without status migrainosus, not intractable  Ambulatory referral/consult to Neurology            Plan:  1. The patient did well following the left L5/S1 and S1 transforaminal epidural steroid injection.  She can contact us to repeat this in the future.  We also discussed doing an injection on the right side if necessary.  2. I placed a referral to headache Neurology for her migraines.    3. She will continue her home exercise program.    4. Follow-up as needed.

## 2024-12-23 ENCOUNTER — TELEPHONE (OUTPATIENT)
Dept: NEUROLOGY | Facility: CLINIC | Age: 34
End: 2024-12-23
Payer: OTHER GOVERNMENT

## 2024-12-23 NOTE — TELEPHONE ENCOUNTER
Spoke with patient and scheduled appointment from referral for headaches. Appointment scheduled for 8 am on 2/7/25 with Shazia Denis.

## 2025-01-27 DIAGNOSIS — M54.16 LUMBAR RADICULOPATHY: ICD-10-CM

## 2025-01-27 DIAGNOSIS — M47.816 LUMBAR SPONDYLOSIS: ICD-10-CM

## 2025-01-27 RX ORDER — PREGABALIN 50 MG/1
CAPSULE ORAL
Qty: 30 CAPSULE | Refills: 2 | Status: SHIPPED | OUTPATIENT
Start: 2025-01-27

## 2025-01-30 ENCOUNTER — LAB VISIT (OUTPATIENT)
Dept: LAB | Facility: HOSPITAL | Age: 35
End: 2025-01-30
Attending: INTERNAL MEDICINE
Payer: OTHER GOVERNMENT

## 2025-01-30 DIAGNOSIS — Z00.00 WELLNESS EXAMINATION: ICD-10-CM

## 2025-01-30 LAB
ALBUMIN SERPL BCP-MCNC: 4.3 G/DL (ref 3.5–5.2)
ALP SERPL-CCNC: 50 U/L (ref 40–150)
ALT SERPL W/O P-5'-P-CCNC: 15 U/L (ref 10–44)
ANION GAP SERPL CALC-SCNC: 12 MMOL/L (ref 8–16)
AST SERPL-CCNC: 13 U/L (ref 10–40)
BASOPHILS # BLD AUTO: 0.07 K/UL (ref 0–0.2)
BASOPHILS NFR BLD: 1 % (ref 0–1.9)
BILIRUB SERPL-MCNC: 0.5 MG/DL (ref 0.1–1)
BUN SERPL-MCNC: 8 MG/DL (ref 6–20)
CALCIUM SERPL-MCNC: 9.8 MG/DL (ref 8.7–10.5)
CHLORIDE SERPL-SCNC: 106 MMOL/L (ref 95–110)
CHOLEST SERPL-MCNC: 119 MG/DL (ref 120–199)
CHOLEST/HDLC SERPL: 2.2 {RATIO} (ref 2–5)
CO2 SERPL-SCNC: 22 MMOL/L (ref 23–29)
CREAT SERPL-MCNC: 0.8 MG/DL (ref 0.5–1.4)
DIFFERENTIAL METHOD BLD: NORMAL
EOSINOPHIL # BLD AUTO: 0.1 K/UL (ref 0–0.5)
EOSINOPHIL NFR BLD: 1.6 % (ref 0–8)
ERYTHROCYTE [DISTWIDTH] IN BLOOD BY AUTOMATED COUNT: 13.5 % (ref 11.5–14.5)
EST. GFR  (NO RACE VARIABLE): >60 ML/MIN/1.73 M^2
GLUCOSE SERPL-MCNC: 75 MG/DL (ref 70–110)
HCT VFR BLD AUTO: 43.3 % (ref 37–48.5)
HDLC SERPL-MCNC: 53 MG/DL (ref 40–75)
HDLC SERPL: 44.5 % (ref 20–50)
HGB BLD-MCNC: 13.9 G/DL (ref 12–16)
IMM GRANULOCYTES # BLD AUTO: 0.02 K/UL (ref 0–0.04)
IMM GRANULOCYTES NFR BLD AUTO: 0.3 % (ref 0–0.5)
LDLC SERPL CALC-MCNC: 47.8 MG/DL (ref 63–159)
LYMPHOCYTES # BLD AUTO: 1.8 K/UL (ref 1–4.8)
LYMPHOCYTES NFR BLD: 24.8 % (ref 18–48)
MCH RBC QN AUTO: 28.8 PG (ref 27–31)
MCHC RBC AUTO-ENTMCNC: 32.1 G/DL (ref 32–36)
MCV RBC AUTO: 90 FL (ref 82–98)
MONOCYTES # BLD AUTO: 0.6 K/UL (ref 0.3–1)
MONOCYTES NFR BLD: 8.3 % (ref 4–15)
NEUTROPHILS # BLD AUTO: 4.7 K/UL (ref 1.8–7.7)
NEUTROPHILS NFR BLD: 64 % (ref 38–73)
NONHDLC SERPL-MCNC: 66 MG/DL
NRBC BLD-RTO: 0 /100 WBC
PLATELET # BLD AUTO: 397 K/UL (ref 150–450)
PMV BLD AUTO: 9.7 FL (ref 9.2–12.9)
POTASSIUM SERPL-SCNC: 4.5 MMOL/L (ref 3.5–5.1)
PROT SERPL-MCNC: 7.4 G/DL (ref 6–8.4)
RBC # BLD AUTO: 4.82 M/UL (ref 4–5.4)
SODIUM SERPL-SCNC: 140 MMOL/L (ref 136–145)
TRIGL SERPL-MCNC: 91 MG/DL (ref 30–150)
TSH SERPL DL<=0.005 MIU/L-ACNC: 2.21 UIU/ML (ref 0.4–4)
WBC # BLD AUTO: 7.31 K/UL (ref 3.9–12.7)

## 2025-01-30 PROCEDURE — 80053 COMPREHEN METABOLIC PANEL: CPT | Performed by: INTERNAL MEDICINE

## 2025-01-30 PROCEDURE — 80061 LIPID PANEL: CPT | Performed by: INTERNAL MEDICINE

## 2025-01-30 PROCEDURE — 36415 COLL VENOUS BLD VENIPUNCTURE: CPT | Performed by: INTERNAL MEDICINE

## 2025-01-30 PROCEDURE — 84443 ASSAY THYROID STIM HORMONE: CPT | Performed by: INTERNAL MEDICINE

## 2025-01-30 PROCEDURE — 85025 COMPLETE CBC W/AUTO DIFF WBC: CPT | Performed by: INTERNAL MEDICINE

## 2025-02-23 ENCOUNTER — PATIENT MESSAGE (OUTPATIENT)
Dept: PAIN MEDICINE | Facility: CLINIC | Age: 35
End: 2025-02-23
Payer: OTHER GOVERNMENT

## 2025-02-25 ENCOUNTER — TELEPHONE (OUTPATIENT)
Dept: PAIN MEDICINE | Facility: CLINIC | Age: 35
End: 2025-02-25
Payer: OTHER GOVERNMENT

## 2025-02-25 DIAGNOSIS — M54.16 LUMBAR RADICULOPATHY: Primary | ICD-10-CM

## 2025-02-25 RX ORDER — ALPRAZOLAM 1 MG/1
1 TABLET, ORALLY DISINTEGRATING ORAL ONCE AS NEEDED
OUTPATIENT
Start: 2025-02-25 | End: 2036-07-24

## 2025-02-25 RX ORDER — HYDROCODONE BITARTRATE AND ACETAMINOPHEN 5; 325 MG/1; MG/1
1 TABLET ORAL EVERY 8 HOURS PRN
Qty: 15 TABLET | Refills: 0 | Status: SHIPPED | OUTPATIENT
Start: 2025-02-25

## 2025-02-25 NOTE — TELEPHONE ENCOUNTER
Spoke with patient and scheduled. Advised to hold NSAIDs x 2 days and Mounjaro x 7 days prior. Pre op information given and follow up appointment scheduled.

## 2025-02-25 NOTE — TELEPHONE ENCOUNTER
Physician - Dr Gya    Type of Procedure/Injection - Lumbar Transforaminal Epidural  L5/S1 and S1      Laterality - Left      Priority - Normal      Anxiolysis- Local      Need to hold medication - Yes      NSAIDs for 2 days    Tirzepatide (Mounjaro) 7 days      Clearance needed - No      Follow up - 4 week

## 2025-02-25 NOTE — TELEPHONE ENCOUNTER
I didn't recall any interaction between lipitor and tramadol but I double checked and there is no interaction. The interaction would be between buproprion, busipirone and tramdol since these all increase serotonin but again this is a fairly low risk. That said, we can do a low dose hydrocodone, I'll send it.

## 2025-03-04 ENCOUNTER — RESULTS FOLLOW-UP (OUTPATIENT)
Dept: URGENT CARE | Facility: CLINIC | Age: 35
End: 2025-03-04

## 2025-03-04 ENCOUNTER — OFFICE VISIT (OUTPATIENT)
Dept: URGENT CARE | Facility: CLINIC | Age: 35
End: 2025-03-04
Payer: OTHER GOVERNMENT

## 2025-03-04 VITALS
RESPIRATION RATE: 20 BRPM | OXYGEN SATURATION: 96 % | HEART RATE: 98 BPM | HEIGHT: 67 IN | TEMPERATURE: 99 F | DIASTOLIC BLOOD PRESSURE: 74 MMHG | WEIGHT: 184.19 LBS | SYSTOLIC BLOOD PRESSURE: 113 MMHG | BODY MASS INDEX: 28.91 KG/M2

## 2025-03-04 DIAGNOSIS — R52 BODY ACHES: ICD-10-CM

## 2025-03-04 DIAGNOSIS — J45.909 ASTHMA DUE TO SEASONAL ALLERGIES: ICD-10-CM

## 2025-03-04 DIAGNOSIS — R09.89 DECREASED BREATH SOUNDS OF BOTH LUNGS: ICD-10-CM

## 2025-03-04 DIAGNOSIS — R05.9 COUGH, UNSPECIFIED TYPE: ICD-10-CM

## 2025-03-04 DIAGNOSIS — R53.83 FATIGUE, UNSPECIFIED TYPE: ICD-10-CM

## 2025-03-04 DIAGNOSIS — R51.9 NONINTRACTABLE HEADACHE, UNSPECIFIED CHRONICITY PATTERN, UNSPECIFIED HEADACHE TYPE: ICD-10-CM

## 2025-03-04 DIAGNOSIS — J30.2 ACUTE SEASONAL ALLERGIC RHINITIS: Primary | ICD-10-CM

## 2025-03-04 DIAGNOSIS — J02.9 SORE THROAT: ICD-10-CM

## 2025-03-04 LAB
CTP QC/QA: YES
CTP QC/QA: YES
POC MOLECULAR INFLUENZA A AGN: NEGATIVE
POC MOLECULAR INFLUENZA B AGN: NEGATIVE
SARS CORONAVIRUS 2 ANTIGEN: NEGATIVE

## 2025-03-04 PROCEDURE — 96372 THER/PROPH/DIAG INJ SC/IM: CPT | Mod: S$GLB,,, | Performed by: NURSE PRACTITIONER

## 2025-03-04 PROCEDURE — 87502 INFLUENZA DNA AMP PROBE: CPT | Mod: QW,S$GLB,, | Performed by: NURSE PRACTITIONER

## 2025-03-04 PROCEDURE — 87811 SARS-COV-2 COVID19 W/OPTIC: CPT | Mod: QW,S$GLB,, | Performed by: NURSE PRACTITIONER

## 2025-03-04 PROCEDURE — 99204 OFFICE O/P NEW MOD 45 MIN: CPT | Mod: 25,S$GLB,, | Performed by: NURSE PRACTITIONER

## 2025-03-04 RX ORDER — DEXAMETHASONE SODIUM PHOSPHATE 10 MG/ML
10 INJECTION INTRAMUSCULAR; INTRAVENOUS
Status: COMPLETED | OUTPATIENT
Start: 2025-03-04 | End: 2025-03-04

## 2025-03-04 RX ORDER — DEXAMETHASONE SODIUM PHOSPHATE 10 MG/ML
10 INJECTION INTRAMUSCULAR; INTRAVENOUS
Status: DISCONTINUED | OUTPATIENT
Start: 2025-03-04 | End: 2025-03-04

## 2025-03-04 RX ADMIN — DEXAMETHASONE SODIUM PHOSPHATE 10 MG: 10 INJECTION INTRAMUSCULAR; INTRAVENOUS at 11:03

## 2025-03-04 NOTE — PATIENT INSTRUCTIONS
"    Asthma in adults - Discharge instructions     How do I care for myself at home? --   Ask the doctor or nurse what you should do when you go home. Make sure that you understand exactly what you need to do to care for yourself. Ask questions if there is anything you do not understand.  You should also:   Take all of your medicines as instructed - The doctor might prescribe:   Quick-relief medicines - These stop symptoms quickly, in 5 to 15 minutes. Almost everyone with asthma carries a quick-relief inhaler with them. People use these medicines whenever they have asthma symptoms. Most people need these medicines 1 or 2 times a week, or less often. But when asthma symptoms get worse, more doses might be needed.   Long-term controller medicines - These control asthma and help prevent future attacks. People who get asthma symptoms more than 2 times a week should use a controller medicine every day.  Some medicines can work as both a controller medicine and a quick-relief medicine. These are taken once or twice a day as controller medicines. They can also be used for quick relief.   Avoid smoking - Quitting smoking is the most important thing that you can do for your health. If you are having trouble quitting, your doctor or nurse can help. Do not allow others to smoke near you. Smoke can stay on clothes and furniture and cause breathing problems.   Avoid "triggers" - These are things that make your symptoms worse. Common triggers include smoke, air pollution, dust, mold, pollen, strong chemicals or smells, and very cold or dry air. For some people, being around certain animals can trigger symptoms. Exercise and stress can also be triggers.  If you can't avoid certain triggers, talk with your doctor about what you can do. For example, you might need to take an extra dose of your quick-relief inhaler medicine before you exercise or are around things you are allergic to.   Lower your risk of getting sick - Some " "infections can make asthma symptoms worse. These include the common cold, the flu, and COVID-19.  It's important to get the COVID-19 vaccine. This lowers the risk of severe illness if you do get COVID-19. You should also get a flu shot every year. Plus, some people need to get a vaccine to help prevent pneumonia.   Follow your "asthma action plan" if you have one (form 1) - This is a list of instructions that tell you:   Which medicines to use each day at home   Which medicines to take if your symptoms get worse   When to get help or call for an ambulance  As part of your action plan, you might need to use something called a "peak flow meter." Breathing into this device shows how your lungs are working. Your doctor will show you the right way to use your peak flow meter.    Patient Instructions   You received a steroid shot today. Please keep in mind that you should not take long term steroids unless prescribed by your physician. You should not exceed 4 steroid injections in a year and if you have multiple injections they should be spaced out adequately (atleast 3 months apart). As discussed, there are potential risks/side effects with steroid injections. This can elevate your blood pressure, elevate your blood sugar, cause water weight gain, nervous energy, increased heart rate, redness to the face and dimpling of the skin where the shot goes in. Please contact your doctor if you have any of these side effects. If you have diabetes this injection will raise your blood sugar. This will normalize over the next 2-3 days. Please make sure you monitor you blood sugar accordingly. This medication can also increase you blood pressure. If you have high blood pressure please monitor your blood pressure as discussed.       Urgent Care Discharge Information    If you have been discharged from the clinic prior to your point of care test results being completed, please make sure to check your [facility] account.  If there is a " change in treatment, we will communicate with you through here.  If your test is positive, and medications are ordered, these will be sent to your preferred pharmacy.   If your test is negative, no further steps needed. If you do not hear from us or have questions, please call the clinic.        - You must understand that you have received an Urgent Care treatment only and that you may be released before all of your medical problems are known or treated.   - You, the patient, will arrange for follow up care as instructed with your primary care provider or recommended specialist.   -  Please arrange follow up with your primary medical clinic as soon as possible.   - If your condition worsens or fails to improve we recommend that you receive another evaluation at the ER immediately or contact your PCP to discuss your concerns, or return here.   - Please do not drive or make any important decisions for 24 hours if you have received any pain medications, sedatives or mood altering drugs during your visit.    WE CANNOT RULE OUT ALL POSSIBLE CAUSES OF YOUR SYMPTOMS IN THE URGENT CARE SETTING.  PLEASE GO TO THE ER IF YOU FEELS YOUR CONDITION IS WORSENING OR YOU WOULD LIKE EMERGENT EVALUATION.  GO TO THE EMERGENCY DEPARTMENT FOR ANY NEW OR WORSENING SYMPTOMS INCLUDING:  WORSENING ABDOMINAL PAIN, DARK/BLACK/BLOODY BOWEL MOVEMENTS, VOMITING BLOOD, HARD ABDOMEN, FEVER, CHEST PAIN, SHORTNESS OF BREATH, LOSS OF CONSCIOUSNESS, OR ANY OTHER CONCERN.

## 2025-03-04 NOTE — PROGRESS NOTES
"Subjective:      Patient ID: Phill May is a 35 y.o. female.    Vitals:  height is 5' 7" (1.702 m) and weight is 83.6 kg (184 lb 3.1 oz). Her oral temperature is 98.7 °F (37.1 °C). Her blood pressure is 113/74 and her pulse is 98. Her respiration is 20 and oxygen saturation is 96%.     Chief Complaint: Cough    Other  This is a new problem. The current episode started yesterday. The problem occurs constantly. The problem has been rapidly worsening. Associated symptoms include coughing (dry cough), fatigue, headaches, myalgias and a sore throat (scratchy, but not painful). Pertinent negatives include no abdominal pain, change in bowel habit, chest pain (chest tightness), chills, congestion, fever, nausea, swollen glands or vomiting. She has tried nothing for the symptoms. The treatment provided no relief.       Constitution: Positive for fatigue. Negative for chills and fever.   HENT:  Positive for sore throat (scratchy, but not painful). Negative for congestion.    Cardiovascular:  Negative for chest pain (chest tightness).   Respiratory:  Positive for cough (dry cough).    Gastrointestinal:  Negative for abdominal pain, nausea and vomiting.   Musculoskeletal:  Positive for muscle ache.   Neurological:  Positive for headaches.      Objective:     Physical Exam   Constitutional: She is oriented to person, place, and time. She appears well-developed. She is cooperative.  Non-toxic appearance. She does not appear ill. No distress. normal and well-groomedawake  HENT:   Head: Normocephalic and atraumatic.   Ears:   Right Ear: Hearing, tympanic membrane, external ear and ear canal normal.   Left Ear: Hearing, tympanic membrane, external ear and ear canal normal.   Nose: Mucosal edema and rhinorrhea present. No purulent discharge or nasal deformity. No epistaxis. Right sinus exhibits no maxillary sinus tenderness and no frontal sinus tenderness. Left sinus exhibits no maxillary sinus tenderness and no frontal sinus " tenderness.   Mouth/Throat: Uvula is midline and mucous membranes are normal. No trismus in the jaw. Normal dentition. No uvula swelling. Posterior oropharyngeal edema and posterior oropharyngeal erythema present. No oropharyngeal exudate.   Post Nasal Drip      Comments: Post Nasal Drip  Eyes: Conjunctivae and lids are normal. No scleral icterus.   Neck: Trachea normal and phonation normal. Neck supple. No edema present. No erythema present. No neck rigidity present.   Cardiovascular: Normal rate, regular rhythm, normal heart sounds and normal pulses.   Pulmonary/Chest: Effort normal. No respiratory distress. She has decreased breath sounds. She has wheezes in the right upper field and the left upper field. She has no rhonchi.   Slight wheeze         Comments: Slight wheeze    Abdominal: Normal appearance.   Musculoskeletal: Normal range of motion.         General: No deformity. Normal range of motion.   Neurological: She is alert and oriented to person, place, and time. She exhibits normal muscle tone. Coordination normal.   Skin: Skin is warm, dry, intact, not diaphoretic and not pale.   Psychiatric: Her speech is normal and behavior is normal. Judgment and thought content normal.   Nursing note and vitals reviewed.    Assessment:     1. Acute seasonal allergic rhinitis    2. Asthma due to seasonal allergies    3. Decreased breath sounds of both lungs    4. Nonintractable headache, unspecified chronicity pattern, unspecified headache type    5. Cough, unspecified type    6. Fatigue, unspecified type    7. Body aches    8. Sore throat        Plan:     Results for orders placed or performed in visit on 03/04/25   SARS Coronavirus 2 Antigen, POCT Manual Read    Collection Time: 03/04/25 11:10 AM   Result Value Ref Range    SARS Coronavirus 2 Antigen Negative Negative, Presumptive Negative     Acceptable Yes    POCT Influenza A/B MOLECULAR    Collection Time: 03/04/25 11:11 AM   Result Value Ref Range     POC Molecular Influenza A Ag Negative Negative    POC Molecular Influenza B Ag Negative Negative     Acceptable Yes          Acute seasonal allergic rhinitis  -     Discontinue: dexAMETHasone injection 10 mg  -     dexAMETHasone injection 10 mg    Asthma due to seasonal allergies  -     Discontinue: dexAMETHasone injection 10 mg  -     dexAMETHasone injection 10 mg    Decreased breath sounds of both lungs    Nonintractable headache, unspecified chronicity pattern, unspecified headache type  -     SARS Coronavirus 2 Antigen, POCT Manual Read  -     POCT Influenza A/B MOLECULAR    Cough, unspecified type  -     SARS Coronavirus 2 Antigen, POCT Manual Read  -     POCT Influenza A/B MOLECULAR    Fatigue, unspecified type    Body aches    Sore throat        Asthma in adults - Discharge instructions     How do I care for myself at home? --   Ask the doctor or nurse what you should do when you go home. Make sure that you understand exactly what you need to do to care for yourself. Ask questions if there is anything you do not understand.  You should also:   Take all of your medicines as instructed - The doctor might prescribe:   Quick-relief medicines - These stop symptoms quickly, in 5 to 15 minutes. Almost everyone with asthma carries a quick-relief inhaler with them. People use these medicines whenever they have asthma symptoms. Most people need these medicines 1 or 2 times a week, or less often. But when asthma symptoms get worse, more doses might be needed.   Long-term controller medicines - These control asthma and help prevent future attacks. People who get asthma symptoms more than 2 times a week should use a controller medicine every day.  Some medicines can work as both a controller medicine and a quick-relief medicine. These are taken once or twice a day as controller medicines. They can also be used for quick relief.   Avoid smoking - Quitting smoking is the most important thing that you can  "do for your health. If you are having trouble quitting, your doctor or nurse can help. Do not allow others to smoke near you. Smoke can stay on clothes and furniture and cause breathing problems.   Avoid "triggers" - These are things that make your symptoms worse. Common triggers include smoke, air pollution, dust, mold, pollen, strong chemicals or smells, and very cold or dry air. For some people, being around certain animals can trigger symptoms. Exercise and stress can also be triggers.  If you can't avoid certain triggers, talk with your doctor about what you can do. For example, you might need to take an extra dose of your quick-relief inhaler medicine before you exercise or are around things you are allergic to.   Lower your risk of getting sick - Some infections can make asthma symptoms worse. These include the common cold, the flu, and COVID-19.  It's important to get the COVID-19 vaccine. This lowers the risk of severe illness if you do get COVID-19. You should also get a flu shot every year. Plus, some people need to get a vaccine to help prevent pneumonia.   Follow your "asthma action plan" if you have one (form 1) - This is a list of instructions that tell you:   Which medicines to use each day at home   Which medicines to take if your symptoms get worse   When to get help or call for an ambulance  As part of your action plan, you might need to use something called a "peak flow meter." Breathing into this device shows how your lungs are working. Your doctor will show you the right way to use your peak flow meter.           Patient Instructions   You received a steroid shot today. Please keep in mind that you should not take long term steroids unless prescribed by your physician. You should not exceed 4 steroid injections in a year and if you have multiple injections they should be spaced out adequately (atleast 3 months apart). As discussed, there are potential risks/side effects with steroid injections. " This can elevate your blood pressure, elevate your blood sugar, cause water weight gain, nervous energy, increased heart rate, redness to the face and dimpling of the skin where the shot goes in. Please contact your doctor if you have any of these side effects. If you have diabetes this injection will raise your blood sugar. This will normalize over the next 2-3 days. Please make sure you monitor you blood sugar accordingly. This medication can also increase you blood pressure. If you have high blood pressure please monitor your blood pressure as discussed.

## 2025-03-14 ENCOUNTER — TELEPHONE (OUTPATIENT)
Dept: SURGERY | Facility: HOSPITAL | Age: 35
End: 2025-03-14
Payer: OTHER GOVERNMENT

## 2025-03-14 NOTE — TELEPHONE ENCOUNTER
Pt is scheduled with Dr Gay on 3/17 but has a work conflict and needs to reschedule. Please contact pt to reschedule.

## 2025-03-18 ENCOUNTER — TELEPHONE (OUTPATIENT)
Dept: PAIN MEDICINE | Facility: CLINIC | Age: 35
End: 2025-03-18
Payer: OTHER GOVERNMENT

## 2025-03-18 NOTE — TELEPHONE ENCOUNTER
----- Message from Wyatt Pollock sent at 3/18/2025  9:41 AM CDT -----  Type:  Reschedule Appointment Request Caller is requesting to reschedule appointment.   Name of Caller:pt When is the first available appointment? Scheduled for 3/21 Symptoms:na Would the patient rather a call back or a response via [facility]? Call back Best Call Back Number:[phone]  Additional Information: Pt needed to reschedule the procedure on 3/21 due to work.    Please call Back to advise. Thanks!

## 2025-03-26 ENCOUNTER — TELEPHONE (OUTPATIENT)
Dept: SURGERY | Facility: HOSPITAL | Age: 35
End: 2025-03-26
Payer: OTHER GOVERNMENT

## 2025-03-26 NOTE — TELEPHONE ENCOUNTER
"Pt is scheduled for [facility] tomorrow - Thurs., 3/27/2025. Pt called & states she is "not feeling well and would like to reschedule". Please call pt to reschedule. Thank you.   "

## 2025-04-04 DIAGNOSIS — M54.16 LUMBAR RADICULOPATHY: Primary | ICD-10-CM

## 2025-04-04 RX ORDER — HYDROCODONE BITARTRATE AND ACETAMINOPHEN 5; 325 MG/1; MG/1
1 TABLET ORAL EVERY 8 HOURS PRN
Qty: 15 TABLET | Refills: 0 | Status: SHIPPED | OUTPATIENT
Start: 2025-04-04

## 2025-04-07 ENCOUNTER — TELEPHONE (OUTPATIENT)
Dept: SURGERY | Facility: HOSPITAL | Age: 35
End: 2025-04-07
Payer: OTHER GOVERNMENT

## 2025-04-07 ENCOUNTER — HOSPITAL ENCOUNTER (OUTPATIENT)
Dept: RADIOLOGY | Facility: HOSPITAL | Age: 35
Discharge: HOME | End: 2025-04-07
Attending: ANESTHESIOLOGY
Payer: OTHER GOVERNMENT

## 2025-04-07 ENCOUNTER — TELEPHONE (OUTPATIENT)
Dept: PAIN MEDICINE | Facility: CLINIC | Age: 35
End: 2025-04-07
Payer: OTHER GOVERNMENT

## 2025-04-07 DIAGNOSIS — M54.50 LOWER BACK PAIN: ICD-10-CM

## 2025-04-07 NOTE — TELEPHONE ENCOUNTER
----- Message from Itzel sent at 4/7/2025  7:58 AM CDT -----  Contact: Patient  Type:  Sooner Apoointment RequestCaller is requesting a sooner appointment.  Caller declined first available appointment listed below.  Caller will not accept being placed on the waitlist and is requesting a message be sent to doctor.Name of Caller:  PatientWhen is the first available appointment?  N/AWould the patient rather a call back or a response via [facility]?   Call [facility] Call Back Number:  [phone] Information:   States she would like to speak with someone to reschedule today's procedure - please call - thank you  
Returned patient's call, no answer, left message to call back.   
23-Feb-2020 13:26

## 2025-04-08 ENCOUNTER — TELEPHONE (OUTPATIENT)
Dept: PAIN MEDICINE | Facility: CLINIC | Age: 35
End: 2025-04-08
Payer: OTHER GOVERNMENT

## 2025-04-08 DIAGNOSIS — M54.16 LUMBAR RADICULITIS: ICD-10-CM

## 2025-04-08 DIAGNOSIS — M54.16 LUMBAR RADICULOPATHY: Primary | ICD-10-CM

## 2025-04-08 NOTE — TELEPHONE ENCOUNTER
Spoke with pt who stated she is in the  and she had an assignment causing her to miss her procedure  4/8 she rescheduled for 4/23 informed pt to follow same instructions as previous and that she will receive a phone call 1-2 dys prior to her procedure for arrival time and other instructions. Her in clinic f/u is 5/26. Pt verbalized understanding.

## 2025-04-23 ENCOUNTER — HOSPITAL ENCOUNTER (OUTPATIENT)
Dept: RADIOLOGY | Facility: HOSPITAL | Age: 35
Discharge: HOME | End: 2025-04-23
Attending: ANESTHESIOLOGY
Payer: OTHER GOVERNMENT

## 2025-04-23 ENCOUNTER — HOSPITAL ENCOUNTER (OUTPATIENT)
Facility: HOSPITAL | Age: 35
Discharge: HOME | End: 2025-04-23
Attending: ANESTHESIOLOGY | Admitting: ANESTHESIOLOGY
Payer: OTHER GOVERNMENT

## 2025-04-23 VITALS
WEIGHT: 178 LBS | TEMPERATURE: 98 F | RESPIRATION RATE: 16 BRPM | HEIGHT: 67 IN | OXYGEN SATURATION: 100 % | SYSTOLIC BLOOD PRESSURE: 147 MMHG | DIASTOLIC BLOOD PRESSURE: 70 MMHG | HEART RATE: 87 BPM | BODY MASS INDEX: 27.94 KG/M2

## 2025-04-23 DIAGNOSIS — M54.16 LUMBAR RADICULITIS: ICD-10-CM

## 2025-04-23 DIAGNOSIS — M54.16 LUMBAR RADICULOPATHY: ICD-10-CM

## 2025-04-23 DIAGNOSIS — M54.50 LOWER BACK PAIN: ICD-10-CM

## 2025-04-23 LAB
B-HCG UR QL: NEGATIVE
CTP QC/QA: YES

## 2025-04-23 PROCEDURE — 63600175 PHARM REV CODE 636 W HCPCS: Mod: PO | Performed by: ANESTHESIOLOGY

## 2025-04-23 PROCEDURE — 64483 NJX AA&/STRD TFRM EPI L/S 1: CPT | Mod: PO,LT | Performed by: ANESTHESIOLOGY

## 2025-04-23 PROCEDURE — 25000003 PHARM REV CODE 250: Mod: PO | Performed by: ANESTHESIOLOGY

## 2025-04-23 PROCEDURE — 25500020 PHARM REV CODE 255: Mod: PO | Performed by: ANESTHESIOLOGY

## 2025-04-23 PROCEDURE — 64483 NJX AA&/STRD TFRM EPI L/S 1: CPT | Mod: LT,,, | Performed by: ANESTHESIOLOGY

## 2025-04-23 RX ORDER — LIDOCAINE HYDROCHLORIDE 10 MG/ML
INJECTION, SOLUTION EPIDURAL; INFILTRATION; INTRACAUDAL; PERINEURAL
Status: DISCONTINUED | OUTPATIENT
Start: 2025-04-23 | End: 2025-04-23 | Stop reason: HOSPADM

## 2025-04-23 RX ORDER — ALPRAZOLAM 0.5 MG/1
1 TABLET, ORALLY DISINTEGRATING ORAL NIGHTLY PRN
Status: DISCONTINUED | OUTPATIENT
Start: 2025-04-23 | End: 2025-04-23 | Stop reason: HOSPADM

## 2025-04-23 RX ORDER — METHYLPREDNISOLONE ACETATE 80 MG/ML
INJECTION, SUSPENSION INTRA-ARTICULAR; INTRALESIONAL; INTRAMUSCULAR; SOFT TISSUE
Status: DISCONTINUED | OUTPATIENT
Start: 2025-04-23 | End: 2025-04-23 | Stop reason: HOSPADM

## 2025-04-23 RX ORDER — BUPIVACAINE HYDROCHLORIDE 2.5 MG/ML
INJECTION, SOLUTION EPIDURAL; INFILTRATION; INTRACAUDAL; PERINEURAL
Status: DISCONTINUED | OUTPATIENT
Start: 2025-04-23 | End: 2025-04-23 | Stop reason: HOSPADM

## 2025-04-23 RX ADMIN — ALPRAZOLAM 1 MG: 0.5 TABLET, ORALLY DISINTEGRATING ORAL at 04:04

## 2025-04-23 NOTE — DISCHARGE SUMMARY
[facility] - Surgery  Discharge Note  Short Stay    Procedure(s) (LRB):  INJECTION, SPINE, LUMBOSACRAL, TRANSFORAMINAL APPROACH L5/S1 and S/1 (Left)      OUTCOME: Patient tolerated treatment/procedure well without complication and is now ready for discharge.    DISPOSITION: Home or Self Care    FINAL DIAGNOSIS:  Lumbar radiculopathy    FOLLOWUP: In clinic    DISCHARGE INSTRUCTIONS:    Discharge Procedure Orders   Diet Adult Regular     No dressing needed     Notify your health care provider if you experience any of the following:  temperature >100.4     Activity as tolerated           16

## 2025-04-23 NOTE — H&P
CC: Back pain    HPI: The patient is a 34yo woman with a history of lumbar radiculopathy here for left L5 and S1 TFESI. There are no major changes in history and physical from 12/12/24.    Past Medical History:   Diagnosis Date    Migraine     PCOS (polycystic ovarian syndrome)        Past Surgical History:   Procedure Laterality Date    EPIDURAL STEROID INJECTION INTO CERVICAL SPINE N/A 5/31/2021    Procedure: Injection-steroid-epidural-cervical C6/7 spread to the right;  Surgeon: Jigar Gay MD;  Location: [facility] OR;  Service: Pain Management;  Laterality: N/A;    EPIDURAL STEROID INJECTION INTO LUMBAR SPINE N/A 8/26/2021    Procedure: Injection-steroid-epidural-lumbar l5/S1 interlaminar;  Surgeon: Jigar Gay MD;  Location: [facility] OR;  Service: Pain Management;  Laterality: N/A;    EPIDURAL STEROID INJECTION INTO LUMBAR SPINE N/A 11/18/2022    Procedure: Injection-steroid-epidural-lumbar L5/S1;  Surgeon: Jigar Gay MD;  Location: [facility] OR;  Service: Pain Management;  Laterality: N/A;    INJECTION OF ANESTHETIC AGENT AROUND MEDIAL BRANCH NERVES INNERVATING LUMBAR FACET JOINT Bilateral 10/18/2023    Procedure: Block-nerve-medial branch-lumbar Bilat L4/5 and L5/S1;  Surgeon: Jigar Gay MD;  Location: [facility] OR;  Service: Pain Management;  Laterality: Bilateral;  Bilat L4/5 and L5/S1    INJECTION OF ANESTHETIC AGENT AROUND MEDIAL BRANCH NERVES INNERVATING LUMBAR FACET JOINT Bilateral 11/20/2023    Procedure: BLOCK, NERVE, FACET JOINT, LUMBAR, MEDIAL BRANCH Bilat L4/5 and L5/S1;  Surgeon: Jigar Gay MD;  Location: [facility] OR;  Service: Pain Management;  Laterality: Bilateral;    MOUTH SURGERY      RADIOFREQUENCY ABLATION OF LUMBAR MEDIAL BRANCH NERVE AT SINGLE LEVEL Bilateral 1/26/2024    Procedure: Radiofrequency Ablation, Nerve, Spinal, Lumbar, Medial Branch, L4/5 and L5/S1;  Surgeon: Jigar Gay MD;  Location: [facility] OR;  Service: Pain Management;  Laterality: Bilateral;     TRANSFORAMINAL EPIDURAL INJECTION OF STEROID Left 10/21/2021    Procedure: Injection,steroid,epidural,transforaminal approach L5/S1;  Surgeon: Jigar Gay MD;  Location: [facility] OR;  Service: Pain Management;  Laterality: Left;    TRANSFORAMINAL EPIDURAL INJECTION OF STEROID Left 12/6/2021    Procedure: Injection,steroid,epidural,transforaminal approach L5/S1;  Surgeon: Jigar Gay MD;  Location: [facility] OR;  Service: Pain Management;  Laterality: Left;    TRANSFORAMINAL EPIDURAL INJECTION OF STEROID Left 2/21/2022    Procedure: Injection,steroid,epidural,transforaminal approach L5/S1;  Surgeon: Jigar Gay MD;  Location: [facility] OR;  Service: Pain Management;  Laterality: Left;    TRANSFORAMINAL EPIDURAL INJECTION OF STEROID Left 5/3/2024    Procedure: Injection,steroid,epidural,transforaminal approach    L5/S1 and S1;  Surgeon: Jigar Gay MD;  Location: [facility] OR;  Service: Pain Management;  Laterality: Left;    TRANSFORAMINAL EPIDURAL INJECTION OF STEROID Left 10/23/2024    Procedure: Injection,steroid,epidural,transforaminal approach L5/S1 and S1;  Surgeon: Jigar Gay MD;  Location: [facility] OR;  Service: Pain Management;  Laterality: Left;    TRANSFORAMINAL EPIDURAL INJECTION OF STEROID Left 4/7/2025    Procedure: Injection,steroid,epidural,transforaminal approach  L5/S1 and S1;  Surgeon: Jigar Gay MD;  Location: [facility] OR;  Service: Pain Management;  Laterality: Left;  normal    VAGINAL DELIVERY      x3    WISDOM TOOTH EXTRACTION         Family History   Problem Relation Name Age of Onset    Cancer Father         Social History     Socioeconomic History    Marital status:    Tobacco Use    Smoking status: Never    Smokeless tobacco: Never   Substance and Sexual Activity    Alcohol use: No    Drug use: No    Sexual activity: Yes     Partners: Male       Current Medications[1]    Review of patient's allergies indicates:   Allergen Reactions    No known drug allergies   "      Vitals:    04/16/25 1142 04/23/25 1517   BP:  126/84   Pulse:  90   Resp:  17   Temp:  98.1 °F (36.7 °C)   TempSrc:  Skin   SpO2:  100%   Weight: 80.7 kg (178 lb)    Height: 5' 7" (1.702 m)        ASA 2, Mallampati 2    REVIEW OF SYSTEMS:     GENERAL: No weight loss, malaise or fevers.  HEENT:  No recent changes in vision or hearing  NECK: Negative for lumps, no difficulty with swallowing.  RESPIRATORY: Negative for cough, wheezing or shortness of breath, patient denies any recent URI.  CARDIOVASCULAR: Negative for chest pain, leg swelling or palpitations.  GI: Negative for abdominal discomfort, blood in stools or black stools or change in bowel habits.  MUSCULOSKELETAL: See HPI.  SKIN: Negative for lesions, rash, and itching.  PSYCH: No suicidal or homicidal ideations, no current mood disturbances.  HEMATOLOGY/LYMPHOLOGY: Negative for prolonged bleeding, bruising easily or swollen nodes. Patient is not currently taking any anti-coagulants  ENDO: No history of diabetes or thyroid dysfunction  NEURO: No history of syncope, paralysis, seizures or tremors.All other reviewed and negative other than HPI.    Physical exam:  Gen: A and O x3, pleasant, well-groomed  Skin: No rashes or obvious lesions  HEENT: PERRLA, no obvious deformities on ears or in canals. No thyroid masses, trachea midline, no palpable lymph nodes in neck, axilla.  CVS: Regular rate and rhythm, normal S1 and S2, no murmurs.  Resp: Clear to auscultation bilaterally.  Abdomen: Soft, NT/ND, normal bowel sounds present.  Musculoskeletal/Neuro: Moving all extremities    Assessment:  Lumbar radiculopathy  -     Place in Outpatient; Standing  -     Vital signs; Standing  -     Verify informed consent; Standing  -     Notify physician ; Standing  -     Notify physician ; Standing  -     Notify physician (specify); Standing  -     Diet NPO; Standing    Lumbar radiculitis    Other orders  -     IP VTE LOW RISK PATIENT; Standing               [1]   No " current facility-administered medications for this encounter.

## 2025-04-23 NOTE — OP NOTE
PROCEDURE DATE: 4/23/2025    PROCEDURE: Left L5 and S1 transforaminal epidural steroid injection under fluoroscopy    DIAGNOSIS: Lumbar  Radiculopathy    Post op diagnosis: Same    PHYSICIAN: Jigar Gay MD    MEDICATIONS INJECTED:  Methylprednisolone 40mg (1ml) and 1ml 0.25% bupivicaine at each nerve root.     LOCAL ANESTHETIC INJECTED:  Lidocaine 1%. 4 ml per site.    SEDATION MEDICATIONS: none    ESTIMATED BLOOD LOSS:  none    COMPLICATIONS:  none    TECHNIQUE:   A time-out was taken to identify patient and procedure side prior to starting the procedure. The patient was placed in a prone position, prepped and draped in the usual sterile fashion using ChloraPrep and sterile towels.  The area to be injected was determined under fluoroscopic guidance in AP and oblique view.  Local anesthetic was given by raising a wheal and going down to the hub of a 25-gauge 1.5 inch needle.  In oblique view, a 5 inch 22-gauge bent-tip spinal needle was introduced towards 6 oclock position of the pedicle of each above named nerve root level.  The needle was walked medially then hinged into the neural foramen and position was confirmed in AP and lateral views.  Omnipaque contrast dye was injected to confirm appropriate placement and that there was no vascular uptake.  After negative aspiration for blood or CSF, the medication was then injected. This was performed at the left L5 and S1 level(s). The patient tolerated the procedure well.    The patient was monitored after the procedure.  Patient was given post procedure and discharge instructions to follow at home. The patient was discharged in a stable condition.

## 2025-04-30 DIAGNOSIS — M47.816 LUMBAR SPONDYLOSIS: ICD-10-CM

## 2025-04-30 DIAGNOSIS — M54.16 LUMBAR RADICULOPATHY: ICD-10-CM

## 2025-05-01 RX ORDER — PREGABALIN 50 MG/1
CAPSULE ORAL
Qty: 30 CAPSULE | Refills: 2 | Status: SHIPPED | OUTPATIENT
Start: 2025-05-01

## 2025-05-26 ENCOUNTER — OFFICE VISIT (OUTPATIENT)
Dept: PAIN MEDICINE | Facility: CLINIC | Age: 35
End: 2025-05-26
Payer: OTHER GOVERNMENT

## 2025-05-26 VITALS
SYSTOLIC BLOOD PRESSURE: 117 MMHG | HEART RATE: 98 BPM | BODY MASS INDEX: 27.5 KG/M2 | WEIGHT: 175.63 LBS | DIASTOLIC BLOOD PRESSURE: 74 MMHG

## 2025-05-26 DIAGNOSIS — M47.816 LUMBAR SPONDYLOSIS: ICD-10-CM

## 2025-05-26 DIAGNOSIS — M54.16 LUMBAR RADICULOPATHY, CHRONIC: Primary | ICD-10-CM

## 2025-05-26 PROCEDURE — 99214 OFFICE O/P EST MOD 30 MIN: CPT | Mod: PBBFAC,PO | Performed by: PHYSICIAN ASSISTANT

## 2025-05-26 PROCEDURE — 99999 PR PBB SHADOW E&M-EST. PATIENT-LVL IV: CPT | Mod: PBBFAC,,, | Performed by: PHYSICIAN ASSISTANT

## 2025-05-26 PROCEDURE — 99213 OFFICE O/P EST LOW 20 MIN: CPT | Mod: S$PBB,,, | Performed by: PHYSICIAN ASSISTANT

## 2025-05-28 NOTE — PROGRESS NOTES
This note was completed with dictation software and grammatical errors may exist.    CC:  Neck pain, back pain    HPI:  The patient is a 35-year-old woman with a history of cervical DDD who presented in referral from [facility] for cervical radiculopathy.    History of Present Illness    Patient presents for follow-up after a recent injection for back pain. She initially experienced relief for a few days post-injection, pain returned and is now most noticeable during extended periods of activity and at night, affecting sleep. She estimates about 30-35% pain relief compared to before the injection, which is less than the 60% relief from a previous injection in December. The right side has minimal pain, while the main discomfort is on the left side.    Pain is described as severe and worse than baseline, but not as intense as before the injection. She reports persistent back numbness. Occasionally, depending on prolonged sitting or standing, there is a tingling sensation down the left leg and in the great toe. The numbness extends from the lower back and buttocks to the upper side. Shooting pain is reported, particularly after long periods of sitting.    During a recent 6.5-hour car trip, she felt pain and used seat heat for relief, stopping to stretch periodically. Upon arrival, pain was significant, making it difficult to sleep that night.    She uses a rowing machine for exercise, finding it beneficial as it provides a good stretch without causing excessive pain. Muscle soreness is noted after rowing but is distinguished from the back pain. Walking is normal, with no perceived leg weakness during daily activities. The only time a difference is noticed between the two legs is during extended periods of standing, which can lead to shooting pain.    She denies any leg giving out, current leg weakness during normal activities, or urinary or bowel incontinence.           Prior history: She presents in  follow-up for neck pain.  She was sent for direct procedure from to [facility].  She is status post C6/7 interlaminar ZAY on 05/31/2021 with 50% improvement, almost complete improvement of her right arm numbness and tingling however.  She reports that her neck pain began in March, 2021 when she was throwing a medicine ball over her head.  She had sudden pain in the neck, right shoulder and began having numbness and tingling in her right arm.  She reports that pain is aching, numb, sharp, shooting, tingling and deep.  She states that her pain was much worse when having to drive but also with running which she needs to do for her physical fitness test.  She does get some relief with physical therapy, medications including ibuprofen, Mobic and stretching helps.  She does report having chronic migraine headaches, has not seen neurology for this.  In the last year because of her neck pain this seems to have exacerbated her headaches.    Her other complaint is back pain in the mid and low back for the last year.  Particularly in the low back, radiates out to the lateral hips, sometimes radiates along her posterior thighs into her calves.  This pain is worse with running, also worse with sitting too long and when moving from sitting to standing.  She denies any constant numbness, no bowel or bladder incontinence.    Pain intervention history: She is status post C6/7 interlaminar ZAY on 05/31/2021 with 50% improvement of neck pain, almost 90% improvement of right arm pain.She is status post L5/S1 ZAY on 08/26/2021 with No major benefit.   She is status post left L5/S1 transforaminal epidural steroid injection on 10/21/2021 with 50% relief.   She is status post left L5/S1 transforaminal epidural steroid injection on 12/06/2021 with additional relief for 3 weeks.   She is status post left L5/S1 transforaminal epidural steroid injection on 02/21/2022 with about 50% relief.   She is status post left L5/S1 transforaminal  epidural steroid injection on 02/21/2022 with a couple days of relief, now reporting 0% relief.   She is status post bilateral L4/5 and L5/S1 medial branch radiofrequency ablation on 01/26/2024 reporting almost complete relief for the 1st 3 and half weeks but then developed radicular symptoms.    She is status post left L5/S1 and S1 transforaminal epidural steroid injection on 05/03/2024 with 50-60% relief lasting about 4 and half months.  She is status post left L5 and S1 transforaminal epidural steroid injection on 04/23/2025 with 30-35% relief.    Spine surgeries:None    Antineuropathics:  Gabapentin 600 mg nightly  NSAIDs:  Mobic, ibuprofen both provide 30-50% relief.  Currently taking nabumetone 750 mg twice daily as needed  Physical therapy:  She has gone to [place] physical therapy but this did not give lasting relief and she reports having some increased pain afterwards.  She does continue her home exercise program.  Antidepressants:  Muscle relaxers:  Tizanidine 2 mg nightly as needed  Opioids:  Antiplatelets/Anticoagulants:    ROS:  She reports fatigability, headaches and back pain.  Balance of review of systems is negative.    Lab Results   Component Value Date    HGBA1C 5.3 09/25/2023       Lab Results   Component Value Date    WBC 7.31 01/30/2025    HGB 13.9 01/30/2025    HCT 43.3 01/30/2025    MCV 90 01/30/2025     01/30/2025             Past Medical History:   Diagnosis Date    Migraine     PCOS (polycystic ovarian syndrome)        Past Surgical History:   Procedure Laterality Date    EPIDURAL STEROID INJECTION INTO CERVICAL SPINE N/A 5/31/2021    Procedure: Injection-steroid-epidural-cervical C6/7 spread to the right;  Surgeon: Jigar Gay MD;  Location: [facility] OR;  Service: Pain Management;  Laterality: N/A;    EPIDURAL STEROID INJECTION INTO LUMBAR SPINE N/A 8/26/2021    Procedure: Injection-steroid-epidural-lumbar l5/S1 interlaminar;  Surgeon: Jigar Gay MD;  Location: [facility] OR;   Service: Pain Management;  Laterality: N/A;    EPIDURAL STEROID INJECTION INTO LUMBAR SPINE N/A 11/18/2022    Procedure: Injection-steroid-epidural-lumbar L5/S1;  Surgeon: Jigar Gay MD;  Location: [facility] OR;  Service: Pain Management;  Laterality: N/A;    INJECTION OF ANESTHETIC AGENT AROUND MEDIAL BRANCH NERVES INNERVATING LUMBAR FACET JOINT Bilateral 10/18/2023    Procedure: Block-nerve-medial branch-lumbar Bilat L4/5 and L5/S1;  Surgeon: Jigar Gay MD;  Location: [facility] OR;  Service: Pain Management;  Laterality: Bilateral;  Bilat L4/5 and L5/S1    INJECTION OF ANESTHETIC AGENT AROUND MEDIAL BRANCH NERVES INNERVATING LUMBAR FACET JOINT Bilateral 11/20/2023    Procedure: BLOCK, NERVE, FACET JOINT, LUMBAR, MEDIAL BRANCH Bilat L4/5 and L5/S1;  Surgeon: Jigar Gay MD;  Location: [facility] OR;  Service: Pain Management;  Laterality: Bilateral;    INJECTION, SPINE, LUMBOSACRAL, TRANSFORAMINAL APPROACH Left 4/23/2025    Procedure: INJECTION, SPINE, LUMBOSACRAL, TRANSFORAMINAL APPROACH L5/S1 and S/1;  Surgeon: Jigar Gay MD;  Location: [facility] OR;  Service: Pain Management;  Laterality: Left;    MOUTH SURGERY      RADIOFREQUENCY ABLATION OF LUMBAR MEDIAL BRANCH NERVE AT SINGLE LEVEL Bilateral 1/26/2024    Procedure: Radiofrequency Ablation, Nerve, Spinal, Lumbar, Medial Branch, L4/5 and L5/S1;  Surgeon: Jigar Gay MD;  Location: [facility] OR;  Service: Pain Management;  Laterality: Bilateral;    TRANSFORAMINAL EPIDURAL INJECTION OF STEROID Left 10/21/2021    Procedure: Injection,steroid,epidural,transforaminal approach L5/S1;  Surgeon: Jigar Gay MD;  Location: [facility] OR;  Service: Pain Management;  Laterality: Left;    TRANSFORAMINAL EPIDURAL INJECTION OF STEROID Left 12/6/2021    Procedure: Injection,steroid,epidural,transforaminal approach L5/S1;  Surgeon: Jigar Gay MD;  Location: [facility] OR;  Service: Pain Management;  Laterality: Left;    TRANSFORAMINAL EPIDURAL  INJECTION OF STEROID Left 2/21/2022    Procedure: Injection,steroid,epidural,transforaminal approach L5/S1;  Surgeon: Jigar Gay MD;  Location: [facility] OR;  Service: Pain Management;  Laterality: Left;    TRANSFORAMINAL EPIDURAL INJECTION OF STEROID Left 5/3/2024    Procedure: Injection,steroid,epidural,transforaminal approach    L5/S1 and S1;  Surgeon: Jigar Gay MD;  Location: [facility] OR;  Service: Pain Management;  Laterality: Left;    TRANSFORAMINAL EPIDURAL INJECTION OF STEROID Left 10/23/2024    Procedure: Injection,steroid,epidural,transforaminal approach L5/S1 and S1;  Surgeon: Jigar Gay MD;  Location: [facility] OR;  Service: Pain Management;  Laterality: Left;    TRANSFORAMINAL EPIDURAL INJECTION OF STEROID Left 4/7/2025    Procedure: Injection,steroid,epidural,transforaminal approach  L5/S1 and S1;  Surgeon: Jigar Gay MD;  Location: [facility] OR;  Service: Pain Management;  Laterality: Left;  normal    VAGINAL DELIVERY      x3    WISDOM TOOTH EXTRACTION         Social History     Socioeconomic History    Marital status:    Tobacco Use    Smoking status: Never    Smokeless tobacco: Never   Substance and Sexual Activity    Alcohol use: No    Drug use: No    Sexual activity: Yes     Partners: Male         Medications/Allergies: See med card    Vitals:    05/26/25 1607   BP: 117/74   Pulse: 98   Weight: 79.6 kg (175 lb 9.5 oz)   PainSc:   4   PainLoc: Back         5/26/2025     4:06 PM 12/12/2024     8:20 AM 10/1/2024     8:20 AM   Last 3 PDI Scores   Pain Disability Index (PDI) 34 28 32           Physical exam:  Gen: A and O x3, pleasant, well-groomed  Skin: No rashes or obvious lesions  HEENT: PERRLA, no obvious deformities on ears or in canals. Trachea midline.  CVS: Regular rate and rhythm, normal palpable pulses.  Resp:No increased work of breathing, symmetrical chest rise.  Abdomen: Soft, NT/ND.  Musculoskeletal:No antalgic gait.     Neuro:    Iliopsoas Quadriceps  Knee  Flexion Tibialis  anterior Gastro- cnemius EHL   Lower: R 5/5 5/5 5/5 5/5 5/5 5/5    L 4+/5 5/5 5/5 5/5 5/5 5/5      Left  Right    Patellar DTR 2+ 1+   Achilles DTR 2+ 1+   Munoz Absent  Absent   Clonus Absent Absent   Babinski Absent Absent     Intact and symmetrical to light touch and pinprick in L1-S1 dermatomes bilaterally.    Lumbar spine:  Lumbar spine:  Range of motion is moderately decreased with both flexion and extension with increased left low back pain during each maneuver.  Frandy's test causes no increased pain on either side.    Supine straight leg raise low back and left leg pain.  Internal and external rotation of the hip causes no increased pain on either side.  Myofascial exam:  Moderate tenderness to palpation to the left lower lumbar paraspinous musculature and left upper buttock.      Imagin21 MRI C-spine:  C2-C3: No disc herniation or significant posterior osseous ridging. No significant spinal canal or foraminal stenosis.   C3-C4: No disc herniation or significant posterior osseous ridging. No significant spinal canal or foraminal stenosis.   C4-C5: Mild disc osteophyte complex.  Slight ventral cord flattening with preserved ventral and dorsal CSF.  No significant foraminal stenosis.   C5-C6: Mild disc osteophyte complex.  Slight ventral cord flattening with preserved ventral and dorsal CSF.  No significant foraminal stenosis.   C6-C7: Mild disc osteophyte complex.  Preserved ventral and dorsal CSF.  No significant foraminal stenosis.   C7-T1: No disc herniation or significant posterior osseous ridging. No significant spinal canal or foraminal stenosis.    21 MRI L-spine:  T12-L1: Unremarkable   L1-2: Unremarkable   L2-3: There is only a minimal disc bulge.  There is no spinal canal or significant foraminal stenosis.   L3-4: There is minimal bulging of the annulus.  There is also mild facet joint arthropathy.  There is flattening of the ventral dural sac.  There is  borderline spinal stenosis.  The foramina are patent.   L4-5: There is a mild disc bulge slightly eccentric to the left.  There is mild facet joint arthropathy.  There is flattening of the ventral dural sac.  There is no spinal stenosis.  There is only mild bilateral foraminal stenosis without spinal stenosis.   L5-S1: There is mild disc space narrowing.  There is a mild disc bulge with superimposed broad left paracentral and proximal foraminal disc protrusion.  There is no spinal stenosis but there is crowding of the left lateral recess which could irritate the left S1 root.  Also, there is mild-to-moderate left foraminal stenosis.    12/27/22 MRI lumbar spine   Alignment: Normal.     Vertebral column: Vertebral body heights are maintained.  No evidence of an acute fracture or aggressive marrow replacement process. Moderate intervertebral disc space narrowing with disc extrusion through an annular fissure at L5-S1.  Remainder of the disc spaces are preserved.     Cord: Normal.  Conus terminates at L1.     Degenerative findings:     L1-L2: Disc is normal in configuration.  There is no facet arthropathy.  There is no neural foraminal stenosis.  There is no spinal canal stenosis.     L2-L3: Disc is normal in configuration.  There is no facet arthropathy.  There is no neural foraminal stenosis.  There is no spinal canal stenosis.     L3-L4: Minimal diffuse disc bulge.  Mild bilateral facet arthropathy.  There is no neural foraminal stenosis.  There is no spinal canal stenosis.     L4-L5: Mild diffuse disc bulge.  Mild bilateral facet arthropathy.  Mild bilateral neural foraminal stenosis.  There is no spinal canal stenosis.     L5-S1: Diffuse disc bulge with superimposed left central/subarticular disc extrusion through an annular fissure, enlarged since the prior study on 08/06/2021, which now descends approximately 11 mm to the S1 pedicular level.  This narrows the left lateral recess and abuts the descending left S1  nerve root.  Mild bilateral facet arthropathy.  Moderate left and mild right neural foraminal stenosis.    Mild spinal canal stenosis.     Paraspinal muscles & soft tissues: No significant abnormalities.      Assessment:  The patient is a 35-year-old woman with a history of cervical DDD who presented in referral from [facility] for cervical radiculopathy.    1. Lumbar radiculopathy, chronic  MRI Lumbar Spine Without Contrast      2. Lumbar spondylosis              Plan:  1. Unfortunately she did not have the same amount of relief that she typically gets following an epidural steroid injection.  I am going to update her lumbar spine MRI for further evaluation and she will follow-up to review the images.

## 2025-06-04 ENCOUNTER — HOSPITAL ENCOUNTER (OUTPATIENT)
Dept: RADIOLOGY | Facility: HOSPITAL | Age: 35
Discharge: HOME | End: 2025-06-04
Attending: PHYSICIAN ASSISTANT
Payer: OTHER GOVERNMENT

## 2025-06-04 DIAGNOSIS — M54.16 LUMBAR RADICULOPATHY, CHRONIC: ICD-10-CM

## 2025-06-04 PROCEDURE — 72148 MRI LUMBAR SPINE W/O DYE: CPT | Mod: 26,,, | Performed by: RADIOLOGY

## 2025-06-04 PROCEDURE — 72148 MRI LUMBAR SPINE W/O DYE: CPT | Mod: TC,PO

## 2025-06-05 DIAGNOSIS — M54.16 LUMBAR RADICULOPATHY: ICD-10-CM

## 2025-06-05 RX ORDER — HYDROCODONE BITARTRATE AND ACETAMINOPHEN 5; 325 MG/1; MG/1
1 TABLET ORAL EVERY 8 HOURS PRN
Qty: 15 TABLET | Refills: 0 | Status: SHIPPED | OUTPATIENT
Start: 2025-06-05

## 2025-06-13 ENCOUNTER — OFFICE VISIT (OUTPATIENT)
Dept: PAIN MEDICINE | Facility: CLINIC | Age: 35
End: 2025-06-13
Payer: OTHER GOVERNMENT

## 2025-06-13 ENCOUNTER — PATIENT MESSAGE (OUTPATIENT)
Dept: PAIN MEDICINE | Facility: CLINIC | Age: 35
End: 2025-06-13

## 2025-06-13 VITALS
WEIGHT: 175.38 LBS | BODY MASS INDEX: 27.47 KG/M2 | HEART RATE: 93 BPM | SYSTOLIC BLOOD PRESSURE: 111 MMHG | DIASTOLIC BLOOD PRESSURE: 66 MMHG

## 2025-06-13 DIAGNOSIS — M54.89 VERTEBROGENIC PAIN: Primary | ICD-10-CM

## 2025-06-13 DIAGNOSIS — M54.16 LUMBAR RADICULOPATHY: ICD-10-CM

## 2025-06-13 PROCEDURE — 99999 PR PBB SHADOW E&M-EST. PATIENT-LVL IV: CPT | Mod: PBBFAC,,, | Performed by: PHYSICIAN ASSISTANT

## 2025-06-13 PROCEDURE — 99214 OFFICE O/P EST MOD 30 MIN: CPT | Mod: PBBFAC,PO | Performed by: PHYSICIAN ASSISTANT

## 2025-06-13 NOTE — PROGRESS NOTES
This note was completed with dictation software and grammatical errors may exist.    CC:  Neck pain, back pain    HPI:  The patient is a 35-year-old woman with a history of cervical DDD who presented in referral from [facility] for cervical radiculopathy.    History of Present Illness    Patient presents for follow-up of ongoing back and leg pain, predominantly in the back radiating to the left leg. Her back pain is more intense, while leg pain is described as a tingling sensation. Pain distribution is approximately 85% back pain and 15% left leg pain.    Her pain worsens with prolonged standing, activities like mopping, and car rides, forcing breaks or stopping entirely. It is most noticeable in the morning, with her experiencing significant discomfort upon waking. Pain disturbs sleep and is particularly painful when bending backwards.    Her symptoms have worsened since her last MRI about 2.5 years ago. The current MRI shows progression of disc degeneration and changes in disc protrusion, particularly at the L5-S1 level.      Prior history: She presents in follow-up for neck pain.  She was sent for direct procedure from to [facility].  She is status post C6/7 interlaminar ZAY on 05/31/2021 with 50% improvement, almost complete improvement of her right arm numbness and tingling however.  She reports that her neck pain began in March, 2021 when she was throwing a medicine ball over her head.  She had sudden pain in the neck, right shoulder and began having numbness and tingling in her right arm.  She reports that pain is aching, numb, sharp, shooting, tingling and deep.  She states that her pain was much worse when having to drive but also with running which she needs to do for her physical fitness test.  She does get some relief with physical therapy, medications including ibuprofen, Mobic and stretching helps.  She does report having chronic migraine headaches, has not seen neurology for this.   In the last year because of her neck pain this seems to have exacerbated her headaches.    Her other complaint is back pain in the mid and low back for the last year.  Particularly in the low back, radiates out to the lateral hips, sometimes radiates along her posterior thighs into her calves.  This pain is worse with running, also worse with sitting too long and when moving from sitting to standing.  She denies any constant numbness, no bowel or bladder incontinence.    Pain intervention history: She is status post C6/7 interlaminar ZAY on 05/31/2021 with 50% improvement of neck pain, almost 90% improvement of right arm pain.She is status post L5/S1 ZAY on 08/26/2021 with No major benefit.   She is status post left L5/S1 transforaminal epidural steroid injection on 10/21/2021 with 50% relief.   She is status post left L5/S1 transforaminal epidural steroid injection on 12/06/2021 with additional relief for 3 weeks.   She is status post left L5/S1 transforaminal epidural steroid injection on 02/21/2022 with about 50% relief.   She is status post left L5/S1 transforaminal epidural steroid injection on 02/21/2022 with a couple days of relief, now reporting 0% relief.   She is status post bilateral L4/5 and L5/S1 medial branch radiofrequency ablation on 01/26/2024 reporting almost complete relief for the 1st 3 and half weeks but then developed radicular symptoms.    She is status post left L5/S1 and S1 transforaminal epidural steroid injection on 05/03/2024 with 50-60% relief lasting about 4 and half months.  She is status post left L5 and S1 transforaminal epidural steroid injection on 04/23/2025 with 30-35% relief.    Spine surgeries:None    Antineuropathics:  Gabapentin 600 mg nightly  NSAIDs:  Mobic, ibuprofen both provide 30-50% relief.  Currently taking nabumetone 750 mg twice daily as needed  Physical therapy:  She has gone to [facility] but this did not give lasting relief and she reports having  some increased pain afterwards.  She does continue her home exercise program.  Antidepressants:  Muscle relaxers:  Tizanidine 2 mg nightly as needed  Opioids:  Antiplatelets/Anticoagulants:    ROS:  She reports fatigability, headaches and back pain.  Balance of review of systems is negative.    Lab Results   Component Value Date    HGBA1C 5.3 09/25/2023       Lab Results   Component Value Date    WBC 7.31 01/30/2025    HGB 13.9 01/30/2025    HCT 43.3 01/30/2025    MCV 90 01/30/2025     01/30/2025             Past Medical History:   Diagnosis Date    Migraine     PCOS (polycystic ovarian syndrome)        Past Surgical History:   Procedure Laterality Date    EPIDURAL STEROID INJECTION INTO CERVICAL SPINE N/A 5/31/2021    Procedure: Injection-steroid-epidural-cervical C6/7 spread to the right;  Surgeon: Jigar Gay MD;  Location: [facility] OR;  Service: Pain Management;  Laterality: N/A;    EPIDURAL STEROID INJECTION INTO LUMBAR SPINE N/A 8/26/2021    Procedure: Injection-steroid-epidural-lumbar l5/S1 interlaminar;  Surgeon: Jigar Gay MD;  Location: [facility] OR;  Service: Pain Management;  Laterality: N/A;    EPIDURAL STEROID INJECTION INTO LUMBAR SPINE N/A 11/18/2022    Procedure: Injection-steroid-epidural-lumbar L5/S1;  Surgeon: Jigar Gay MD;  Location: [facility] OR;  Service: Pain Management;  Laterality: N/A;    INJECTION OF ANESTHETIC AGENT AROUND MEDIAL BRANCH NERVES INNERVATING LUMBAR FACET JOINT Bilateral 10/18/2023    Procedure: Block-nerve-medial branch-lumbar Bilat L4/5 and L5/S1;  Surgeon: Jigar Gay MD;  Location: [facility] OR;  Service: Pain Management;  Laterality: Bilateral;  Bilat L4/5 and L5/S1    INJECTION OF ANESTHETIC AGENT AROUND MEDIAL BRANCH NERVES INNERVATING LUMBAR FACET JOINT Bilateral 11/20/2023    Procedure: BLOCK, NERVE, FACET JOINT, LUMBAR, MEDIAL BRANCH Bilat L4/5 and L5/S1;  Surgeon: Jigar Gay MD;  Location: [facility] OR;  Service: Pain Management;   Laterality: Bilateral;    INJECTION, SPINE, LUMBOSACRAL, TRANSFORAMINAL APPROACH Left 4/23/2025    Procedure: INJECTION, SPINE, LUMBOSACRAL, TRANSFORAMINAL APPROACH L5/S1 and S/1;  Surgeon: Jigar Gay MD;  Location: [facility] OR;  Service: Pain Management;  Laterality: Left;    MOUTH SURGERY      RADIOFREQUENCY ABLATION OF LUMBAR MEDIAL BRANCH NERVE AT SINGLE LEVEL Bilateral 1/26/2024    Procedure: Radiofrequency Ablation, Nerve, Spinal, Lumbar, Medial Branch, L4/5 and L5/S1;  Surgeon: Jigar Gay MD;  Location: [facility] OR;  Service: Pain Management;  Laterality: Bilateral;    TRANSFORAMINAL EPIDURAL INJECTION OF STEROID Left 10/21/2021    Procedure: Injection,steroid,epidural,transforaminal approach L5/S1;  Surgeon: Jigar Gay MD;  Location: [facility] OR;  Service: Pain Management;  Laterality: Left;    TRANSFORAMINAL EPIDURAL INJECTION OF STEROID Left 12/6/2021    Procedure: Injection,steroid,epidural,transforaminal approach L5/S1;  Surgeon: Jigar Gay MD;  Location: [facility] OR;  Service: Pain Management;  Laterality: Left;    TRANSFORAMINAL EPIDURAL INJECTION OF STEROID Left 2/21/2022    Procedure: Injection,steroid,epidural,transforaminal approach L5/S1;  Surgeon: Jigar Gay MD;  Location: [facility] OR;  Service: Pain Management;  Laterality: Left;    TRANSFORAMINAL EPIDURAL INJECTION OF STEROID Left 5/3/2024    Procedure: Injection,steroid,epidural,transforaminal approach    L5/S1 and S1;  Surgeon: Jigar Gay MD;  Location: [facility] OR;  Service: Pain Management;  Laterality: Left;    TRANSFORAMINAL EPIDURAL INJECTION OF STEROID Left 10/23/2024    Procedure: Injection,steroid,epidural,transforaminal approach L5/S1 and S1;  Surgeon: Jigar Gay MD;  Location: [facility] OR;  Service: Pain Management;  Laterality: Left;    TRANSFORAMINAL EPIDURAL INJECTION OF STEROID Left 4/7/2025    Procedure: Injection,steroid,epidural,transforaminal approach  L5/S1 and S1;  Surgeon: Victor Hugo  Jigar EDWARD MD;  Location: [facility] OR;  Service: Pain Management;  Laterality: Left;  normal    VAGINAL DELIVERY      x3    WISDOM TOOTH EXTRACTION         Social History     Socioeconomic History    Marital status:    Tobacco Use    Smoking status: Never    Smokeless tobacco: Never   Substance and Sexual Activity    Alcohol use: No    Drug use: No    Sexual activity: Yes     Partners: Male         Medications/Allergies: See med card    Vitals:    25 0801   BP: 111/66   Pulse: 93   Weight: 79.5 kg (175 lb 6 oz)   PainSc:   5   PainLoc: Back         2025     8:00 AM 2025     4:06 PM 2024     8:20 AM   Last 3 PDI Scores   Pain Disability Index (PDI) 37 [phone]           Physical exam:  Gen: A and O x3, pleasant, well-groomed  Skin: No rashes or obvious lesions  HEENT: PERRLA, no obvious deformities on ears or in canals. Trachea midline.  CVS: Regular rate and rhythm, normal palpable pulses.  Resp:No increased work of breathing, symmetrical chest rise.  Abdomen: Soft, NT/ND.  Musculoskeletal:No antalgic gait.     Neuro:    Iliopsoas Quadriceps Knee  Flexion Tibialis  anterior Gastro- cnemius EHL   Lower: R 5/5 5/5 5/5 5/5 5/5 5/5    L 4+/5 5/5 5/5 5/5 5/5 5/5      Left  Right    Patellar DTR 2+ 1+   Achilles DTR 2+ 1+   Munoz Absent  Absent   Clonus Absent Absent   Babinski Absent Absent     Intact and symmetrical to light touch and pinprick in L1-S1 dermatomes bilaterally.    Lumbar spine:  Lumbar spine:  Range of motion is moderately decreased with both flexion and extension with increased left low back pain during each maneuver.  Frandy's test causes no increased pain on either side.    Supine straight leg raise low back and left leg pain.  Internal and external rotation of the hip causes no increased pain on either side.  Myofascial exam:  Moderate tenderness to palpation to the left lower lumbar paraspinous musculature and left upper buttock.      Imagin21 MRI C-spine:  C2-C3: No  disc herniation or significant posterior osseous ridging. No significant spinal canal or foraminal stenosis.   C3-C4: No disc herniation or significant posterior osseous ridging. No significant spinal canal or foraminal stenosis.   C4-C5: Mild disc osteophyte complex.  Slight ventral cord flattening with preserved ventral and dorsal CSF.  No significant foraminal stenosis.   C5-C6: Mild disc osteophyte complex.  Slight ventral cord flattening with preserved ventral and dorsal CSF.  No significant foraminal stenosis.   C6-C7: Mild disc osteophyte complex.  Preserved ventral and dorsal CSF.  No significant foraminal stenosis.   C7-T1: No disc herniation or significant posterior osseous ridging. No significant spinal canal or foraminal stenosis.    8/6/21 MRI L-spine:  T12-L1: Unremarkable   L1-2: Unremarkable   L2-3: There is only a minimal disc bulge.  There is no spinal canal or significant foraminal stenosis.   L3-4: There is minimal bulging of the annulus.  There is also mild facet joint arthropathy.  There is flattening of the ventral dural sac.  There is borderline spinal stenosis.  The foramina are patent.   L4-5: There is a mild disc bulge slightly eccentric to the left.  There is mild facet joint arthropathy.  There is flattening of the ventral dural sac.  There is no spinal stenosis.  There is only mild bilateral foraminal stenosis without spinal stenosis.   L5-S1: There is mild disc space narrowing.  There is a mild disc bulge with superimposed broad left paracentral and proximal foraminal disc protrusion.  There is no spinal stenosis but there is crowding of the left lateral recess which could irritate the left S1 root.  Also, there is mild-to-moderate left foraminal stenosis.    12/27/22 MRI lumbar spine   Alignment: Normal.     Vertebral column: Vertebral body heights are maintained.  No evidence of an acute fracture or aggressive marrow replacement process. Moderate intervertebral disc space narrowing  with disc extrusion through an annular fissure at L5-S1.  Remainder of the disc spaces are preserved.     Cord: Normal.  Conus terminates at L1.     Degenerative findings:     L1-L2: Disc is normal in configuration.  There is no facet arthropathy.  There is no neural foraminal stenosis.  There is no spinal canal stenosis.     L2-L3: Disc is normal in configuration.  There is no facet arthropathy.  There is no neural foraminal stenosis.  There is no spinal canal stenosis.     L3-L4: Minimal diffuse disc bulge.  Mild bilateral facet arthropathy.  There is no neural foraminal stenosis.  There is no spinal canal stenosis.     L4-L5: Mild diffuse disc bulge.  Mild bilateral facet arthropathy.  Mild bilateral neural foraminal stenosis.  There is no spinal canal stenosis.     L5-S1: Diffuse disc bulge with superimposed left central/subarticular disc extrusion through an annular fissure, enlarged since the prior study on 08/06/2021, which now descends approximately 11 mm to the S1 pedicular level.  This narrows the left lateral recess and abuts the descending left S1 nerve root.  Mild bilateral facet arthropathy.  Moderate left and mild right neural foraminal stenosis.    Mild spinal canal stenosis.     Paraspinal muscles & soft tissues: No significant abnormalities.    06/04/2025 MRI lumbar spine   Alignment: Within normal limits.     Vertebrae: Vertebral body heights are maintained. No acute fracture is identified. No evidence of an aggressive marrow replacement process. Advanced mixed Modic type 1/2 endplate changes with Schmorl's nodes and marginal osteophyte formation at L5-S1, progressed since 20/2.     Discs: Advanced disc degeneration at L5-S1 with severe intervertebral disc space narrowing, disc desiccation, disc bulge and superimposed disc protrusion through an annular fissure at L5-S1, progressed since 2022.  Remainder of the disc spaces are relatively maintained.     Cord: Normal.  Conus terminates at L1.      Degenerative findings:     T12-L1: Disc is normal configuration.  Mild bilateral facet arthropathy.  No neural foraminal or spinal canal stenosis.     L1-L2: Disc is normal in configuration. Mild bilateral facet arthropathy.  No neural foraminal or spinal canal stenosis.     L2-L3: Disc is normal in configuration. Mild bilateral facet arthropathy.  No neural foraminal or spinal canal stenosis.     L3-L4: Mild circumferential disc bulge.  Mild bilateral facet arthropathy and ligamentum flavum thickening.   No significant neural foraminal or spinal canal stenosis.     L4-L5: Mild circumferential disc bulge.  Mild bilateral facet arthropathy and ligamentum flavum thickening.  Mild bilateral neural foraminal stenosis.  Mild spinal canal and lateral recess stenosis.     L5-S1: Severe disc space narrowing.  Circumferential disc bulge with superimposed broad-based central to left foraminal disc protrusion through an annular fissure.  Moderate left facet arthropathy with left facet effusion.  Ligamentum flavum thickening.  Moderate left and mild right neural foraminal stenosis.  Mild spinal canal stenosis.  Moderate left and mild right lateral recess stenosis, with possible impingement upon the descending left S1 nerve roots.     Paraspinal muscles & soft tissues: No significant abnormalities.      Assessment:  The patient is a 35-year-old woman with a history of cervical DDD who presented in referral from [facility] for cervical radiculopathy.    1. Vertebrogenic pain        2. Lumbar radiculopathy  Ambulatory referral/consult to Neurosurgery            Plan:  1. I reviewed the patient's lumbar spine MRI with her and compared to the prior MRI from 2 and half years ago.  We discussed that she has progressive disc space narrowing at L5/S1 now with Modic type 1 and type 2 endplate changes as well as left S1 nerve impingement.  We discussed that she is likely symptomatic from both at times.  Since she did  not have as much relief with the most recent epidural steroid injection I am going to have her follow-up with Neurosurgery for further evaluation.  We also discussed Intracept at L5/S1 for vertebrogenic pain.  She will contact me after her follow-up with Neurosurgery.    This note was generated with the assistance of ambient listening technology. Verbal consent was obtained by the patient and accompanying visitor(s) for the recording of patient appointment to facilitate this note. I attest to having reviewed and edited the generated note for accuracy, though some syntax or spelling errors may persist. Please contact the author of this note for any clarification.

## 2025-06-17 NOTE — TELEPHONE ENCOUNTER
Please send Rx. I have reviewed the [place] Board of Pharmacy website and there are no abberancies.

## 2025-06-18 ENCOUNTER — OFFICE VISIT (OUTPATIENT)
Dept: NEUROSURGERY | Facility: CLINIC | Age: 35
End: 2025-06-18
Payer: OTHER GOVERNMENT

## 2025-06-18 VITALS
SYSTOLIC BLOOD PRESSURE: 134 MMHG | DIASTOLIC BLOOD PRESSURE: 87 MMHG | WEIGHT: 175.25 LBS | BODY MASS INDEX: 27.51 KG/M2 | HEIGHT: 67 IN | HEART RATE: 100 BPM | RESPIRATION RATE: 18 BRPM

## 2025-06-18 DIAGNOSIS — M47.816 LUMBAR SPONDYLOSIS: Primary | ICD-10-CM

## 2025-06-18 DIAGNOSIS — M54.16 LUMBAR RADICULOPATHY: ICD-10-CM

## 2025-06-18 RX ORDER — TRAMADOL HYDROCHLORIDE 50 MG/1
50 TABLET, FILM COATED ORAL EVERY 12 HOURS PRN
Qty: 30 TABLET | Refills: 0 | Status: SHIPPED | OUTPATIENT
Start: 2025-06-18

## 2025-06-18 NOTE — PROGRESS NOTES
Neurosurgery History & Physical    Patient ID: Phill May is a 35 y.o. female.    Chief Complaint   Patient presents with    Follow-up     Lumbar / review MRI       History of Present Illness:   Ms. May is a 35 year old female who presents today for evaluation of low back and left leg pain. She has chronic low back pain. Her leg pain feels like shooting, tingling. Currently her back pain is more constant and bothersome.     Her pain worsens with prolonged standing, activities like mopping, and car rides, forcing breaks or stopping entirely. It is most noticeable in the morning, with her experiencing significant discomfort upon waking. Pain disturbs sleep and is particularly painful when bending backwards.     She has been established with Pain Management and had multiple procedures including injections, MBB/ablations without lasting relief.     Her next step is the Intracept procedure but recommended she come to see NSGY to discuss possible surgical options if she opts for this in the future.     Review of Systems  Constitutional: no fever, chills or night sweats. No changes in weight   Eyes: no visual changes   ENT: no nasal congestion or sore throat   Respiratory: no cough or shortness of breath   Cardiovascular: no chest pain or palpitations   Gastrointestinal: no nausea or vomiting   Genitourinary: no hematuria or dysuria   Integument/Breast: no rash or pruritis   Hematologic/Lymphatic: no easy bruising or lymphadenopathy   Musculoskeletal: no arthralgias or myalgias.   Neurological: +low back pain, left leg pain. no seizures or tremors   Behavioral/Psych: no auditory or visual hallucinations   Endocrine: no heat or cold intolerance     Past Medical History:   Diagnosis Date    Migraine     PCOS (polycystic ovarian syndrome)      Social History[1]  Family History   Problem Relation Name Age of Onset    Cancer Father       Review of patient's allergies indicates:   Allergen Reactions    No known drug  "allergies      Current Medications[2]  Blood pressure 134/87, pulse 100, resp. rate 18, height 5' 7" (1.702 m), weight 79.5 kg (175 lb 4.3 oz).      Neurological Exam    General: well developed, well nourished, no distress.   Neurologic: Alert and oriented. Thought content appropriate.  Cranial nerves: face symmetric, EOMI.   Motor Strength: Moves all extremities spontaneously with good tone. No abnormal movements seen.      Strength   Deltoids Triceps Biceps Wrist Extension Wrist Flexion Hand    Upper: R 5/5 5/5 5/5 5/5 5/5 5/5     L 5/5 5/5 5/5 5/5 5/5 5/5       Iliopsoas Quadriceps Knee  Flexion Tibialis  anterior Gastro- cnemius EHL   Lower: R 5/5 5/5 5/5 5/5 5/5 5/5     L 5/5 5/5 5/5 5/5 5/5 5/5      + SLR, left  - LUCA    Gait: normal    Sensory: intact to light touch throughout  DTR's - 2 + and symmetric in UE and LE  Clonus: absent  Pulm: Normal respiratory effort  Skin: Intact, no visible rashes or lesions         Imaging:   MRI L spine dated 6/4/2025:  FINDINGS:  Alignment: Within normal limits.     Vertebrae: Vertebral body heights are maintained. No acute fracture is identified. No evidence of an aggressive marrow replacement process. Advanced mixed Modic type 1/2 endplate changes with Schmorl's nodes and marginal osteophyte formation at L5-S1, progressed since 20/2.     Discs: Advanced disc degeneration at L5-S1 with severe intervertebral disc space narrowing, disc desiccation, disc bulge and superimposed disc protrusion through an annular fissure at L5-S1, progressed since 2022.  Remainder of the disc spaces are relatively maintained.     Cord: Normal.  Conus terminates at L1.     Degenerative findings:     T12-L1: Disc is normal configuration.  Mild bilateral facet arthropathy.  No neural foraminal or spinal canal stenosis.     L1-L2: Disc is normal in configuration. Mild bilateral facet arthropathy.  No neural foraminal or spinal canal stenosis.     L2-L3: Disc is normal in configuration. Mild " bilateral facet arthropathy.  No neural foraminal or spinal canal stenosis.     L3-L4: Mild circumferential disc bulge.  Mild bilateral facet arthropathy and ligamentum flavum thickening.   No significant neural foraminal or spinal canal stenosis.     L4-L5: Mild circumferential disc bulge.  Mild bilateral facet arthropathy and ligamentum flavum thickening.  Mild bilateral neural foraminal stenosis.  Mild spinal canal and lateral recess stenosis.     L5-S1: Severe disc space narrowing.  Circumferential disc bulge with superimposed broad-based central to left foraminal disc protrusion through an annular fissure.  Moderate left facet arthropathy with left facet effusion.  Ligamentum flavum thickening.  Moderate left and mild right neural foraminal stenosis.  Mild spinal canal stenosis.  Moderate left and mild right lateral recess stenosis, with possible impingement upon the descending left S1 nerve roots.     Paraspinal muscles & soft tissues: No significant abnormalities.     Impression:     1. Multilevel degenerative changes of the lumbar spine, as described in detail above, with advanced disc degeneration at L5-S1, which has progressed since 2022.  Findings result in mild spinal canal and moderate left and mild right lateral recess stenosis, with possible impingement upon the descending left S1 nerve roots.  Moderate left and mild right neural foraminal stenosis at this level.  2. Mild bilateral neural foraminal and mild spinal canal and lateral recess stenosis at L4-5.  No significant neural foraminal or spinal canal stenosis elsewhere in the lumbar spine.    Assessment & Plan:   35 year old female with L5-S1 disc dengeration, modic endplate changes, progressed since last imaging. We discussed options for continued conservative treatment versus surgery which I think would entail decompression and fusion at L5-S1. She is active , very active with young kids. She is not sure she is ready to consider surgery.  She is likely going to move forward with intracept procedure and will contact us if she would like to see a surgeon. Recommended dynamic XRs L spine prior to RTC for surgical discussion. She is agreeable to plan.          [1]   Social History  Socioeconomic History    Marital status:    Tobacco Use    Smoking status: Never    Smokeless tobacco: Never   Substance and Sexual Activity    Alcohol use: No    Drug use: No    Sexual activity: Yes     Partners: Male   [2]   Current Outpatient Medications:     albuterol (PROVENTIL/VENTOLIN HFA) 90 mcg/actuation inhaler, USE 2 INHALATIONS EVERY 6 HOURS AS NEEDED FOR WHEEZING (RESCUE), Disp: 17 g, Rfl: 6    atorvastatin (LIPITOR) 20 MG tablet, Take 1 tablet (20 mg total) by mouth once daily., Disp: 90 tablet, Rfl: 1    buPROPion (WELLBUTRIN XL) 300 MG 24 hr tablet, Take 1 tablet (300 mg total) by mouth once daily., Disp: 90 tablet, Rfl: 1    busPIRone (BUSPAR) 10 MG tablet, Take 1 tablet (10 mg total) by mouth after meals as needed (anxiety)., Disp: 30 tablet, Rfl: 1    cetirizine (ZYRTEC) 10 MG tablet, TAKE 1 TABLET DAILY, Disp: 90 tablet, Rfl: 3    dextroamphetamine-amphetamine (ADDERALL XR) 30 MG 24 hr capsule, Take by mouth once daily., Disp: , Rfl:     diclofenac (VOLTAREN) 50 MG EC tablet, Take 1 tablet (50 mg total) by mouth 3 (three) times daily., Disp: 90 tablet, Rfl: 0    fluticasone propionate (FLONASE) 50 mcg/actuation nasal spray, 1 spray (50 mcg total) by Each Nostril route 2 (two) times daily as needed for Rhinitis., Disp: 16 g, Rfl: 3    HYDROcodone-acetaminophen (NORCO) 5-325 mg per tablet, Take 1 tablet by mouth every 8 (eight) hours as needed for Pain., Disp: 15 tablet, Rfl: 0    metFORMIN (GLUCOPHAGE) 1000 MG tablet, TAKE 1 TABLET TWICE A DAY WITH MEALS, Disp: 180 tablet, Rfl: 1    montelukast (SINGULAIR) 10 mg tablet, Take 1 tablet (10 mg total) by mouth nightly., Disp: 90 tablet, Rfl: 1    ondansetron (ZOFRAN-ODT) 4 MG TbDL, Take 1 tablet (4 mg  total) by mouth every 12 (twelve) hours as needed (nuasea)., Disp: 20 tablet, Rfl: 0    pregabalin (LYRICA) 50 MG capsule, TAKE 1 CAPSULE(50 MG) BY MOUTH EVERY EVENING, Disp: 30 capsule, Rfl: 2    sumatriptan (IMITREX) 50 MG tablet, Take one tablet at onset of headache, and can repeat 2 hours later if needed.  Do not exceed 200 mg in 24 hours, Disp: 27 tablet, Rfl: 1    tirzepatide, weight loss, (ZEPBOUND) 15 mg/0.5 mL PnIj, Inject 15 mg into the skin every 7 days., Disp: 4 Pen, Rfl: 0    tiZANidine (ZANAFLEX) 2 MG tablet, TAKE 1 TABLET EVERY 8 HOURS AS NEEDED, Disp: 60 tablet, Rfl: 17

## 2025-06-19 ENCOUNTER — TELEPHONE (OUTPATIENT)
Dept: PAIN MEDICINE | Facility: CLINIC | Age: 35
End: 2025-06-19
Payer: OTHER GOVERNMENT

## 2025-06-19 DIAGNOSIS — M54.51 VERTEBROGENIC LOW BACK PAIN: ICD-10-CM

## 2025-06-19 DIAGNOSIS — M54.89 VERTEBROGENIC PAIN: Primary | ICD-10-CM

## 2025-06-19 RX ORDER — SODIUM CHLORIDE, SODIUM LACTATE, POTASSIUM CHLORIDE, CALCIUM CHLORIDE 600; 310; 30; 20 MG/100ML; MG/100ML; MG/100ML; MG/100ML
INJECTION, SOLUTION INTRAVENOUS CONTINUOUS
OUTPATIENT
Start: 2025-06-19

## 2025-06-19 NOTE — TELEPHONE ENCOUNTER
Spoke with patient and scheduled procedure and follow up. Let her know to hold Meloxicam or Naprosyn for 4 days, Diclofenac or Ketorolac or Ibuprofen for 1 day, and Nabumetone for 6 days prior to procedure

## 2025-06-19 NOTE — TELEPHONE ENCOUNTER
"This patient has an intracept procedure with Dr. Gay on 7/18 and insurance requires clearance that the patient does not have a hx of substance/alcohol abuse or depression that could be causing their lower back pain. Can you write a letter stating that "This patient does not have a hx of substance/alcohol abuse or depression that could be causing their lower back pain"    "

## 2025-06-25 NOTE — TELEPHONE ENCOUNTER
She cancelled her appointment but will be in 6/30/25. How often are we going to have to be writing these letters for procedures?

## 2025-06-26 NOTE — TELEPHONE ENCOUNTER
Absolutely, and I'm happy to help- I was just wondering as there have been a few lately and I didn't recall having to do it before! Thanks so much. DANISHAT   [FreeTextEntry2] : New Issue c-spine

## 2025-07-02 NOTE — PROGRESS NOTES
Patient ID: [id number]     Chief Complaint: Establish Care    HPI:     Phill May is a 35 y.o. female with diagnosis of Dyslipidemia, PCOS, Headaches. Patient referred by self. Patient's PCP is Dr. barnes. Patient seen today for Obesity.    Weight history   Patient is currently prescribed Zepbound by PCP, started taking October 2024, weight at that time was 196 lbs, BMI 30. Down 24 lbs. Has tried multiple diets with no success.     Previous Obesity Treatment:   Diet/Exercise - unsuccessful  Zepbound - helping  Wellbutrin - not helping with weight loss  Metformin - no help with appetite, weight loss    BMI:  26.9 (07/07/2025)  27.7 (05/27/2025)  27.9 (04/04/2025)  27.7 (02/17/2025)    Weight:  Wt Readings from Last 6 Encounters:   07/07/25 78 kg (172 lb)   06/30/25 77.6 kg (171 lb)   06/18/25 79.5 kg (175 lb 4.3 oz)   06/13/25 79.5 kg (175 lb 6 oz)   05/27/25 80.3 kg (177 lb)   05/26/25 79.6 kg (175 lb 9.5 oz)     Ideal/Adjusted Body Weight:   Ideal: 135 lbs  Adjusted: 150 lbs    Neck Circumference:   14 in    Waist Circumference:  36.5 in     Percent Body Fat:   36.3% (07/07/2025)  https://www.calculator.net/body-fat-calculator.[email or web address]    InBody:   Date: 07/07/2025  Total Body Water: 80.7 lbs  Lean Body Mass: 109.6 lbs  SMM: 60 lbs  Body Fat Mass: 62.4 lbs  Visceral Fat Level: 14  Basal Metabolic Rate: 1,444 kcal    Nutrition history (24 hour food recall)  Breakfast: none (doesn't usually eat breakfast)  Lunch: chicken strips   Dinner: pizza   Snack: yogurt, chips   Water: 2-3 16.9 oz bottles/water  Sugar Sweetened beverages: soda (1-2/day)  Etoh: denies   Satiety cues: feels full after consuming meals; denies having to consume large amounts to feel full (did have to consume large amounts prior to medication)  Cravings: denies (craved sweets prior to starting medication)  Food noise: denies   Dietician: denies     Eating Disorders:   Denies     Current physical activity:   4x/week (jogs 2 miles 2 days/week and  the other 2 days uses her [employer] bike at home)   Barriers: back pain    Stressors/Mental Health   Stress at home/work; denies stress eating; denies Hx of anxiety, depression, bipolar, schizophrenia   Over the last two weeks how often have you been bothered by little interest or pleasure in doing things: 0  Over the last two weeks how often have you been bothered by feeling down, depressed or hopeless: 0  PHQ-2 Total Score: 0    Sleep habits   Sleeps ok; averages 5-6 hours/night     CVD screening  The ASCVD Risk score (Jose Eduardo HINTON, et al., 2019) failed to calculate for the following reasons:    The 2019 ASCVD risk score is only valid for ages 40 to 79    LMP: Patient's last menstrual period was 07/06/2025 (approximate).    Contraception: denies ( had vasectomy)       I spent 15 minutes counseling patient on nutrition/diet and exercise/physical activity as well as risk factor reductions interventions. Educational handout provided on AVS.       Review of patient's allergies indicates:   Allergen Reactions    No known drug allergies      Current Outpatient Medications   Medication Instructions    albuterol (PROVENTIL/VENTOLIN HFA) 90 mcg/actuation inhaler USE 2 INHALATIONS EVERY 6 HOURS AS NEEDED FOR WHEEZING (RESCUE)    atorvastatin (LIPITOR) 20 mg, Oral, Daily    buPROPion (WELLBUTRIN XL) 300 mg, Oral, Daily    busPIRone (BUSPAR) 10 mg, Oral, 3 times daily after meals PRN    cetirizine (ZYRTEC) 10 mg, Oral    dextroamphetamine-amphetamine (ADDERALL XR) 30 MG 24 hr capsule Daily    fluticasone propionate (FLONASE) 50 mcg, Each Nostril, 2 times daily PRN    metFORMIN (GLUCOPHAGE) 1,000 mg, Oral, 2 times daily with meals    montelukast (SINGULAIR) 10 mg, Oral, Nightly    ondansetron (ZOFRAN-ODT) 4 mg, Oral, Every 12 hours PRN    pregabalin (LYRICA) 50 MG capsule TAKE 1 CAPSULE(50 MG) BY MOUTH EVERY EVENING    sumatriptan (IMITREX) 50 MG tablet Take one tablet at onset of headache, and can repeat 2  hours later if needed.  Do not exceed 200 mg in 24 hours    tiZANidine (ZANAFLEX) 2 mg, Every 8 hours PRN    traMADoL (ULTRAM) 50 mg, Oral, Every 12 hours PRN    ZEPBOUND 15 mg, Subcutaneous, Every 7 days     Past Surgical History:   Procedure Laterality Date    EPIDURAL STEROID INJECTION INTO CERVICAL SPINE N/A 5/31/2021    Procedure: Injection-steroid-epidural-cervical C6/7 spread to the right;  Surgeon: Jigar Gay MD;  Location: [facility] OR;  Service: Pain Management;  Laterality: N/A;    EPIDURAL STEROID INJECTION INTO LUMBAR SPINE N/A 8/26/2021    Procedure: Injection-steroid-epidural-lumbar l5/S1 interlaminar;  Surgeon: Jigar Gay MD;  Location: [facility] OR;  Service: Pain Management;  Laterality: N/A;    EPIDURAL STEROID INJECTION INTO LUMBAR SPINE N/A 11/18/2022    Procedure: Injection-steroid-epidural-lumbar L5/S1;  Surgeon: Jigar Gay MD;  Location: [facility] OR;  Service: Pain Management;  Laterality: N/A;    INJECTION OF ANESTHETIC AGENT AROUND MEDIAL BRANCH NERVES INNERVATING LUMBAR FACET JOINT Bilateral 10/18/2023    Procedure: Block-nerve-medial branch-lumbar Bilat L4/5 and L5/S1;  Surgeon: Jigar Gay MD;  Location: [facility];  Service: Pain Management;  Laterality: Bilateral;  Bilat L4/5 and L5/S1    INJECTION OF ANESTHETIC AGENT AROUND MEDIAL BRANCH NERVES INNERVATING LUMBAR FACET JOINT Bilateral 11/20/2023    Procedure: BLOCK, NERVE, FACET JOINT, LUMBAR, MEDIAL BRANCH Bilat L4/5 and L5/S1;  Surgeon: Jigar Gay MD;  Location: [facility];  Service: Pain Management;  Laterality: Bilateral;    INJECTION, SPINE, LUMBOSACRAL, TRANSFORAMINAL APPROACH Left 4/23/2025    Procedure: INJECTION, SPINE, LUMBOSACRAL, TRANSFORAMINAL APPROACH L5/S1 and S/1;  Surgeon: Jigar Gay MD;  Location: [facility];  Service: Pain Management;  Laterality: Left;    MOUTH SURGERY      RADIOFREQUENCY ABLATION OF LUMBAR MEDIAL BRANCH NERVE AT SINGLE LEVEL Bilateral 1/26/2024     Procedure: Radiofrequency Ablation, Nerve, Spinal, Lumbar, Medial Branch, L4/5 and L5/S1;  Surgeon: Jigar Gay MD;  Location: [facility] OR;  Service: Pain Management;  Laterality: Bilateral;    TRANSFORAMINAL EPIDURAL INJECTION OF STEROID Left 10/21/2021    Procedure: Injection,steroid,epidural,transforaminal approach L5/S1;  Surgeon: Jigar Gay MD;  Location: [facility] OR;  Service: Pain Management;  Laterality: Left;    TRANSFORAMINAL EPIDURAL INJECTION OF STEROID Left 12/6/2021    Procedure: Injection,steroid,epidural,transforaminal approach L5/S1;  Surgeon: Jigar Gay MD;  Location: [facility] OR;  Service: Pain Management;  Laterality: Left;    TRANSFORAMINAL EPIDURAL INJECTION OF STEROID Left 2/21/2022    Procedure: Injection,steroid,epidural,transforaminal approach L5/S1;  Surgeon: Jigar Gay MD;  Location: [facility] OR;  Service: Pain Management;  Laterality: Left;    TRANSFORAMINAL EPIDURAL INJECTION OF STEROID Left 5/3/2024    Procedure: Injection,steroid,epidural,transforaminal approach    L5/S1 and S1;  Surgeon: Jigar Gay MD;  Location: [facility] OR;  Service: Pain Management;  Laterality: Left;    TRANSFORAMINAL EPIDURAL INJECTION OF STEROID Left 10/23/2024    Procedure: Injection,steroid,epidural,transforaminal approach L5/S1 and S1;  Surgeon: Jigar Gay MD;  Location: [facility] OR;  Service: Pain Management;  Laterality: Left;    TRANSFORAMINAL EPIDURAL INJECTION OF STEROID Left 4/7/2025    Procedure: Injection,steroid,epidural,transforaminal approach  L5/S1 and S1;  Surgeon: Jigar Gay MD;  Location: [facility] OR;  Service: Pain Management;  Laterality: Left;  normal    VAGINAL DELIVERY      x3    WISDOM TOOTH EXTRACTION       family history includes Cancer in her father.    Social History[1]    Subjective:     Review of Systems   Constitutional: Negative.    HENT: Negative.     Eyes: Negative.    Respiratory: Negative.     Cardiovascular: Negative.   "  Gastrointestinal: Negative.    Endocrine: Negative.    Genitourinary: Negative.    Musculoskeletal: Negative.    Skin: Negative.    Allergic/Immunologic: Negative.    Neurological: Negative.    Hematological: Negative.    Psychiatric/Behavioral: Negative.       Objective:     Visit Vitals  /80 (BP Location: Right arm, Patient Position: Sitting)   Pulse 84   Resp 18   Ht 5' 7" (1.702 m)   Wt 78 kg (172 lb)   LMP 07/06/2025 (Approximate)   SpO2 98%   BMI 26.94 kg/m²       Physical Exam  Vitals reviewed.   Constitutional:       Appearance: Normal appearance. She is obese.   HENT:      Head: Normocephalic and atraumatic.   Eyes:      Extraocular Movements: Extraocular movements intact.      Conjunctiva/sclera: Conjunctivae normal.      Pupils: Pupils are equal, round, and reactive to light.   Cardiovascular:      Rate and Rhythm: Normal rate and regular rhythm.      Heart sounds: Normal heart sounds.   Pulmonary:      Effort: Pulmonary effort is normal.      Breath sounds: Normal breath sounds.   Abdominal:      General: Bowel sounds are normal.   Musculoskeletal:         General: Normal range of motion.      Cervical back: Normal range of motion.   Skin:     General: Skin is warm and dry.   Neurological:      Mental Status: She is alert and oriented to person, place, and time.   Psychiatric:         Mood and Affect: Mood normal.         Behavior: Behavior normal.       Labs Reviewed:     Hematology:  Lab Results   Component Value Date    WBC 7.31 01/30/2025    HGB 13.9 01/30/2025    HCT 43.3 01/30/2025     01/30/2025     Chemistry:  Lab Results   Component Value Date     01/30/2025    K 4.5 01/30/2025    BUN 8 01/30/2025    CREATININE 0.8 01/30/2025    EGFRNORACEVR >60.0 01/30/2025    CALCIUM 9.8 01/30/2025    ALKPHOS 50 01/30/2025    ALBUMIN 4.3 01/30/2025    AST 13 01/30/2025    ALT 15 01/30/2025      Lab Results   Component Value Date    HGBA1C 5.3 09/25/2023      Lipid Panel:  Lab Results " "  Component Value Date    CHOL 119 (L) 01/30/2025    HDL 53 01/30/2025    TRIG 91 01/30/2025    TOTALCHOLEST 2.2 01/30/2025      Thyroid:  Lab Results   Component Value Date    TSH 2.210 01/30/2025      Urine:  No results found for: "COLORUA", "APPEARANCEUA", "SGUA", "PHUA", "PROTEINUA", "GLUCOSEUA", "KETONESUA", "BLOODUA", "NITRITESUA", "LEUKOCYTESUR", "RBCUA", "WBCUA", "BACTERIA", "SQEPUA", "HYALINECASTS", "CREATRANDUR", "PROTEINURINE", "UPROTCREA"     Assessment:       ICD-10-CM ICD-9-CM   1. Dyslipidemia  E78.5 272.4   2. PCOS (polycystic ovarian syndrome)  E28.2 256.4   3. Class 1 obesity due to excess calories with serious comorbidity and body mass index (BMI) of 30.0 to 30.9 in adult  E66.811 278.00    E66.09 V85.30    Z68.30         Plan:     1. Dyslipidemia (Primary)  Continue Atorvastatin  Weight loss encouraged  Low fat/high fiber diet  Increase physical activity  Cholesterol   Date Value Ref Range Status   01/30/2025 119 (L) 120 - 199 mg/dL Final     Comment:     The National Cholesterol Education Program (NCEP) has set the  following guidelines (reference ranges) for Cholesterol:  Optimal.....................<200 mg/dL  Borderline High.............200-239 mg/dL  High........................> or = 240 mg/dL       HDL   Date Value Ref Range Status   01/30/2025 53 40 - 75 mg/dL Final     Comment:     The National Cholesterol Education Program (NCEP) has set the  following guidelines (reference values) for HDL Cholesterol:  Low...............<40 mg/dL  Optimal...........>60 mg/dL       Triglycerides   Date Value Ref Range Status   01/30/2025 91 30 - 150 mg/dL Final     Comment:     The National Cholesterol Education Program (NCEP) has set the  following guidelines (reference values) for triglycerides:  Normal......................<150 mg/dL  Borderline High.............150-199 mg/dL  High........................200-499 mg/dL     - tirzepatide, weight loss, (ZEPBOUND) 15 mg/0.5 mL PnIj; Inject 15 mg into the " "skin every 7 days.  Dispense: 2 mL; Refill: 1    2. PCOS (polycystic ovarian syndrome)  Insulin resistance can be present in both lean and obese women with PCOS, therefore women with PCOS are at increased risk for DM2.   In women with clinical evidence of hyperandrogenism (hirsutism, acne, or male-pattern hair loss on exam), measure serum total testosterone.    Treatments include birth control pills to regularize periods, a medication called metformin to prevent diabetes, statins to control high cholesterol, hormones to increase fertility, and procedures to remove excess hair.   Continue Metformin, Zepbound  Weight loss and exercise can help reverse insulin resistance.   Aerobic activity for 20-30 minutes 5 days a week.  Recommend Low carbohydrate or Mediterranean diet.  Quitting smoking and avoiding secondhand smoke  Limiting alcohol  - tirzepatide, weight loss, (ZEPBOUND) 15 mg/0.5 mL PnIj; Inject 15 mg into the skin every 7 days.  Dispense: 2 mL; Refill: 1    3. Class 1 obesity due to excess calories with serious comorbidity and body mass index (BMI) of 30.0 to 30.9 in adult  Body mass index is 26.94 kg/m².  Wt Readings from Last 1 Encounters:   07/07/25 78 kg (172 lb)   Waist Circumference: 36.5 in  TSH   Date Value Ref Range Status   01/30/2025 2.210 0.400 - 4.000 uIU/mL Final     No results found for: "[id number]"     A modest (5-10%) weight loss improves health and quality of life.     Obesity Complication % Weight Loss for Therapeutic Benefit   Diabetes Prevention 5% to 10%   Diabetes Remission 20%   Hypertension 5% to 15%   CVD Risk Reduction >10%   MASLD/MASH 5% to 10%/ >10%   Sleep Apnea 10%   Osteoarthritis 5% to 10%   Urinary Stress Incontinence  5% to 10%   GERD Women: 5% to 10%, Men 10%   PCOS 5% to 10%         Goal: to weigh 150 lbs  Short-term: lose 10 lbs in 12 weeks  Long-term: lose 40 lbs in 1 year  *Keep in mind that, on average, you may lose 1-2 lbs per week*    Willingness to change: "     Patient qualifies for anti-obesity medications due to starting BMI of 30 with comorbidity. Anti-obesity medications are an adjunct to nutritional, physical activity, and behavioral therapies. Medication alone cannot treat obesity. I recommend starting medication to significantly improve patient's risk factor/s.   Medication:   Continue Zepbound 15 mg SC weekly     Nutrition: Calories per day.      Protein: goal is 102 g/day. Protein has a slower rate of digestion = greater satiety (fullness).        Aim for <25g of added sugar per day.      Recommend to increase fiber intake. USDA guidelines: Women <49 y/o - 25g/day.  www.fullplateliving.org      Eat Fit Shopping List.      Prepare meals in advance.      Eat breakfast within 2 hours of waking up.       Track what you eat. Research shows that people who track their food intake are more successful with weight management. This creates awareness of what you are eating/drinking and can help you see where to make changes.       Get to know your plate.  www.Ubiregi.gov      Stay hydrated.     Activity: 2-3 days of strength training. This can be body weight, light weight or elastic bands exercises. [employer] Platform and [employer]outfit are great sources for free workout plans/videos.     Start slowly with an activity you enjoy and make it a habit.     Ask a family member or friend to get active with you.     Aim for 5,000 - 10,000 steps per day     Schedule time to make physical activity a part of your daily routine.     Exercise prescription:     Frequency: 5 days/week    Intensity: moderate  Moderate: brisk walking (at least 2.5 miles per hour), water aerobics, dancing (ballroom or social), gardening, tennis (doubles), biking slower than 10 miles per hour (should be able to talk but not sing)    Type: jog, bike    Time: 30 minutes     Enjoyment: (make it fun)    Sleep: 7-8 hours of sleep a night    *Recognize your progress and remember that each day is a new day*  *Stay on track,  even when you feel like you're not making progress*  *Sit Less, Stand Often, Move More*  *You don't have to be perfect; be persistent*    Avoiding Weight Regain:  - continued with modified food intake (changed eating habits)  - eat breakfast every day  - weigh yourself at least once a week  - watch less than 10 hours of TV per week  - continue with increased physical activity  - physical activity, on average, about 1 hour per day    - tirzepatide, weight loss, (ZEPBOUND) 15 mg/0.5 mL PnIj; Inject 15 mg into the skin every 7 days.  Dispense: 2 mL; Refill: 1      Follow up in about 8 weeks (around 9/1/2025). In addition to their scheduled follow up, the patient has also been instructed to follow up on as needed basis.     CRIS Kerns           [1]  Social History  Socioeconomic History    Marital status:    Tobacco Use    Smoking status: Never    Smokeless tobacco: Never   Substance and Sexual Activity    Alcohol use: No    Drug use: No    Sexual activity: Yes     Partners: Male

## 2025-07-07 ENCOUNTER — PATIENT MESSAGE (OUTPATIENT)
Dept: PRIMARY CARE CLINIC | Facility: CLINIC | Age: 35
End: 2025-07-07

## 2025-07-07 ENCOUNTER — OFFICE VISIT (OUTPATIENT)
Dept: PRIMARY CARE CLINIC | Facility: CLINIC | Age: 35
End: 2025-07-07
Payer: OTHER GOVERNMENT

## 2025-07-07 VITALS
DIASTOLIC BLOOD PRESSURE: 80 MMHG | HEIGHT: 67 IN | HEART RATE: 84 BPM | WEIGHT: 172 LBS | SYSTOLIC BLOOD PRESSURE: 110 MMHG | OXYGEN SATURATION: 98 % | RESPIRATION RATE: 18 BRPM | BODY MASS INDEX: 27 KG/M2

## 2025-07-07 DIAGNOSIS — E78.5 DYSLIPIDEMIA: Primary | ICD-10-CM

## 2025-07-07 DIAGNOSIS — E66.09 CLASS 1 OBESITY DUE TO EXCESS CALORIES WITH SERIOUS COMORBIDITY AND BODY MASS INDEX (BMI) OF 30.0 TO 30.9 IN ADULT: Chronic | ICD-10-CM

## 2025-07-07 DIAGNOSIS — E66.811 CLASS 1 OBESITY DUE TO EXCESS CALORIES WITH SERIOUS COMORBIDITY AND BODY MASS INDEX (BMI) OF 30.0 TO 30.9 IN ADULT: Chronic | ICD-10-CM

## 2025-07-07 DIAGNOSIS — E28.2 PCOS (POLYCYSTIC OVARIAN SYNDROME): ICD-10-CM

## 2025-07-07 PROCEDURE — 99215 OFFICE O/P EST HI 40 MIN: CPT | Mod: PBBFAC | Performed by: NURSE PRACTITIONER

## 2025-07-07 PROCEDURE — 99999 PR PBB SHADOW E&M-EST. PATIENT-LVL V: CPT | Mod: PBBFAC,,, | Performed by: NURSE PRACTITIONER

## 2025-07-07 PROCEDURE — 99203 OFFICE O/P NEW LOW 30 MIN: CPT | Mod: S$PBB,,, | Performed by: NURSE PRACTITIONER

## 2025-07-07 RX ORDER — TIRZEPATIDE 15 MG/.5ML
15 INJECTION, SOLUTION SUBCUTANEOUS
Qty: 2 ML | Refills: 1 | Status: SHIPPED | OUTPATIENT
Start: 2025-07-07

## 2025-07-07 NOTE — PATIENT INSTRUCTIONS
Protein goal: 102 g/day (34 g/meal)     Protein sources: meat (chicken breast, boneless and skinless chicken thighs, pork tenderloin, 93/7 or 90/10 ground beef/turkey/chicken, flank/filet sirloin, eye of round steak, center but stoddard, boneless think-cut port chop, beef jerky, turkey, fish such as salmon, catfish, tuna, oysters, crab, shrimp, crawfish), low-fat or no-fat dairy (cow/soy milk, cheese, greek yogurt), eggs/egg whites/beaters, nuts/seeds, beans, Quinoa, blue-green algae (powder), nutritional yeast, Nelson bread, keto bread, tofu/tempeh, protein powders, protein shakes, protein bars.       When looking for a lean protein, look for protein > fat on the nutrition label. If fat  > protein, it is considered a high fat meal and should be limited during times of weight loss.       Protein is approximated  1 cup chopped chicken breast: 43 g; 1 chicken thigh (without skin): 28 g; 1 chicken drumstick: 23 g  3 oz pork tenderloin: 22 g; 4 oz 90/10 ground beef: 23 g; 3 oz top sirloin steak: 23 g  3.5 oz roasted turkey breast (bone removed): 29 g, 3.5 oz roasted turkey le g  1/2 fillet of salmon: 39 g, 1 fillet tilapia: 23 g, 3 oz Yellowfin Tuna: 25 g  3.5 oz shrimp: 24 g, 5 oz crawfish tails: 25 g  1 cup Cow/Soy milk: 8 g  1 container (5.3 oz) zero sugar greek yogurt: 12 g  1/2 cup cottage cheese: 13 g, 1 oz cheddar cheese: 7 g; 1 oz american cheese: 5 g; 1 oz provolone cheese: 7 g; 1 oz gouda cheese: 7 g; 1 oz swiss cheese: 8 g  1 eg g  1 oz peanuts: 7 g, 1 oz almonds: 6 g; 1 oz pistachios: 6 g; 1 oz cashews: 5 g; 1 oz walnuts: 4 g; 1 oz hazelnuts: 4 g  2 tbsp natalie seeds: 5 g, 1/4 cup sunflower seeds: 6 g; 3 Tbsp hemp seeds: 9.5 g  1 cup duong beans (raw): 41 g; 1 cup baked beans: 14 g; 1 cup chickpeas: 39 g; 1 cup kidney beans (raw): 43 g; 1 cup large boiled lima beans: 15 g; 1 cup sprouted navy beans (raw): 6 g; 1 cup soybeans (raw): 68 g  1 cup cooked Quinoa: 8 g  1 tsp blue-green algae (powder): 1 g; 2  Tbsp nutritional yeast: 5 g  1 slice (34 g) Nelson bread: 5 g  1/2 cup tofu: 10 g; 1 cup tempeh: 31 g      30 grams of protein cheat sheet:  3.5 oz cooked chicken breast: 172 calories  4 oz ground turkey breast: 120 calories   5 oz [place] strip steak: 325 calories  5 oz shrimp: 170 calories  6 oz cod: 145 calories  5 oz tuna fish: 165 calories  5 oz salmon: 300 calories  8 oz extra firm tofu: 250 calories   1.5 scoops protein powder: 160 calories  1.5 cups edamame: 280 calories  2 cups black beans: 485 calories  5 eggs: 390 calories  1.25 cups egg whites/egg beaters: 155 calories      Protein powders:   https://www.Gusto/best-protein-powders/  Ghost  Optimum Nutrition  Muscle Milk  Garden of Life  Quintana  Naked    Protein shakes:   https://www.eatthis.com/protein-shakes/  Core Power Shakes (26g or 42g protein)  [facility] Nutrition Plan (30g protein)  Ensure Max Protein (30g Protein)  Premier Protein Shakes (30g protein)  Boost High Protein (20g protein)  Muscle Milk Genuine Protein Shakes (25g protein)  Quest Protein Shakes (30g protein)  Orgain Protein Shakes (20g - 26g protein)    Protein bars:  https://www.Gusto/healthiest-protein-bars/  RX Bars  Fit Crunch  THINK  Quest  One  Barebells

## 2025-07-11 ENCOUNTER — TELEPHONE (OUTPATIENT)
Dept: PAIN MEDICINE | Facility: CLINIC | Age: 35
End: 2025-07-11
Payer: OTHER GOVERNMENT

## 2025-07-17 ENCOUNTER — ANESTHESIA EVENT (OUTPATIENT)
Dept: SURGERY | Facility: HOSPITAL | Age: 35
End: 2025-07-17
Payer: OTHER GOVERNMENT

## 2025-07-18 ENCOUNTER — ANESTHESIA (OUTPATIENT)
Dept: SURGERY | Facility: HOSPITAL | Age: 35
End: 2025-07-18
Payer: OTHER GOVERNMENT

## 2025-07-18 ENCOUNTER — HOSPITAL ENCOUNTER (OUTPATIENT)
Dept: RADIOLOGY | Facility: HOSPITAL | Age: 35
Discharge: HOME | End: 2025-07-18
Attending: ANESTHESIOLOGY | Admitting: ANESTHESIOLOGY
Payer: OTHER GOVERNMENT

## 2025-07-18 ENCOUNTER — HOSPITAL ENCOUNTER (OUTPATIENT)
Facility: HOSPITAL | Age: 35
Discharge: HOME | End: 2025-07-18
Attending: ANESTHESIOLOGY | Admitting: ANESTHESIOLOGY
Payer: OTHER GOVERNMENT

## 2025-07-18 VITALS
HEIGHT: 68 IN | WEIGHT: 170 LBS | OXYGEN SATURATION: 100 % | TEMPERATURE: 98 F | RESPIRATION RATE: 13 BRPM | SYSTOLIC BLOOD PRESSURE: 101 MMHG | HEART RATE: 86 BPM | DIASTOLIC BLOOD PRESSURE: 57 MMHG | BODY MASS INDEX: 25.76 KG/M2

## 2025-07-18 DIAGNOSIS — M54.50 LOWER BACK PAIN: ICD-10-CM

## 2025-07-18 DIAGNOSIS — M54.51 VERTEBROGENIC LOW BACK PAIN: ICD-10-CM

## 2025-07-18 DIAGNOSIS — M54.89 VERTEBROGENIC PAIN: ICD-10-CM

## 2025-07-18 LAB
B-HCG UR QL: NEGATIVE
CTP QC/QA: YES

## 2025-07-18 PROCEDURE — 37000009 HC ANESTHESIA EA ADD 15 MINS: Mod: PO | Performed by: ANESTHESIOLOGY

## 2025-07-18 PROCEDURE — 37000008 HC ANESTHESIA 1ST 15 MINUTES: Mod: PO | Performed by: ANESTHESIOLOGY

## 2025-07-18 PROCEDURE — 71000033 HC RECOVERY, INTIAL HOUR: Mod: PO | Performed by: ANESTHESIOLOGY

## 2025-07-18 PROCEDURE — 63600175 PHARM REV CODE 636 W HCPCS: Mod: PO | Performed by: NURSE ANESTHETIST, CERTIFIED REGISTERED

## 2025-07-18 PROCEDURE — 25000003 PHARM REV CODE 250: Mod: PO | Performed by: ANESTHESIOLOGY

## 2025-07-18 PROCEDURE — 63600175 PHARM REV CODE 636 W HCPCS: Mod: PO | Performed by: ANESTHESIOLOGY

## 2025-07-18 PROCEDURE — 71000015 HC POSTOP RECOV 1ST HR: Mod: PO | Performed by: ANESTHESIOLOGY

## 2025-07-18 PROCEDURE — 36000707: Mod: PO | Performed by: ANESTHESIOLOGY

## 2025-07-18 PROCEDURE — 64628 TRML DSTRJ IOS BVN 1ST 2 L/S: CPT | Mod: ,,, | Performed by: ANESTHESIOLOGY

## 2025-07-18 PROCEDURE — 36000706: Mod: PO | Performed by: ANESTHESIOLOGY

## 2025-07-18 PROCEDURE — 27201423 OPTIME MED/SURG SUP & DEVICES STERILE SUPPLY: Mod: PO | Performed by: ANESTHESIOLOGY

## 2025-07-18 PROCEDURE — C1886 CATHETER, ABLATION: HCPCS | Mod: PO | Performed by: ANESTHESIOLOGY

## 2025-07-18 PROCEDURE — 25000003 PHARM REV CODE 250: Mod: PO | Performed by: NURSE ANESTHETIST, CERTIFIED REGISTERED

## 2025-07-18 PROCEDURE — 81025 URINE PREGNANCY TEST: CPT | Mod: PO | Performed by: ANESTHESIOLOGY

## 2025-07-18 RX ORDER — ACETAMINOPHEN 10 MG/ML
INJECTION, SOLUTION INTRAVENOUS
Status: DISCONTINUED | OUTPATIENT
Start: 2025-07-18 | End: 2025-07-18

## 2025-07-18 RX ORDER — PROPOFOL 10 MG/ML
VIAL (ML) INTRAVENOUS CONTINUOUS PRN
Status: DISCONTINUED | OUTPATIENT
Start: 2025-07-18 | End: 2025-07-18

## 2025-07-18 RX ORDER — PROPOFOL 10 MG/ML
VIAL (ML) INTRAVENOUS
Status: DISCONTINUED | OUTPATIENT
Start: 2025-07-18 | End: 2025-07-18

## 2025-07-18 RX ORDER — HYDROCODONE BITARTRATE AND ACETAMINOPHEN 10; 325 MG/1; MG/1
1 TABLET ORAL EVERY 6 HOURS PRN
Qty: 15 TABLET | Refills: 0 | Status: SHIPPED | OUTPATIENT
Start: 2025-07-18 | End: 2025-08-17

## 2025-07-18 RX ORDER — KETAMINE HCL IN 0.9 % NACL 50 MG/5 ML
SYRINGE (ML) INTRAVENOUS
Status: DISCONTINUED | OUTPATIENT
Start: 2025-07-18 | End: 2025-07-18

## 2025-07-18 RX ORDER — SODIUM CHLORIDE, SODIUM LACTATE, POTASSIUM CHLORIDE, CALCIUM CHLORIDE 600; 310; 30; 20 MG/100ML; MG/100ML; MG/100ML; MG/100ML
INJECTION, SOLUTION INTRAVENOUS CONTINUOUS
Status: DISCONTINUED | OUTPATIENT
Start: 2025-07-18 | End: 2025-07-18 | Stop reason: HOSPADM

## 2025-07-18 RX ORDER — OXYCODONE HYDROCHLORIDE 5 MG/1
5 TABLET ORAL ONCE AS NEEDED
Status: COMPLETED | OUTPATIENT
Start: 2025-07-18 | End: 2025-07-18

## 2025-07-18 RX ORDER — ONDANSETRON HYDROCHLORIDE 2 MG/ML
4 INJECTION, SOLUTION INTRAVENOUS DAILY PRN
Status: DISCONTINUED | OUTPATIENT
Start: 2025-07-18 | End: 2025-07-18 | Stop reason: HOSPADM

## 2025-07-18 RX ORDER — SODIUM CHLORIDE, SODIUM LACTATE, POTASSIUM CHLORIDE, CALCIUM CHLORIDE 600; 310; 30; 20 MG/100ML; MG/100ML; MG/100ML; MG/100ML
125 INJECTION, SOLUTION INTRAVENOUS CONTINUOUS
Status: DISCONTINUED | OUTPATIENT
Start: 2025-07-18 | End: 2025-07-18 | Stop reason: HOSPADM

## 2025-07-18 RX ORDER — LIDOCAINE HYDROCHLORIDE 10 MG/ML
1 INJECTION, SOLUTION EPIDURAL; INFILTRATION; INTRACAUDAL; PERINEURAL ONCE
Status: DISCONTINUED | OUTPATIENT
Start: 2025-07-18 | End: 2025-07-18 | Stop reason: HOSPADM

## 2025-07-18 RX ORDER — LIDOCAINE HYDROCHLORIDE 10 MG/ML
INJECTION, SOLUTION EPIDURAL; INFILTRATION; INTRACAUDAL; PERINEURAL
Status: DISCONTINUED | OUTPATIENT
Start: 2025-07-18 | End: 2025-07-18 | Stop reason: HOSPADM

## 2025-07-18 RX ORDER — FENTANYL CITRATE 50 UG/ML
25 INJECTION, SOLUTION INTRAMUSCULAR; INTRAVENOUS EVERY 5 MIN PRN
Status: COMPLETED | OUTPATIENT
Start: 2025-07-18 | End: 2025-07-18

## 2025-07-18 RX ORDER — MIDAZOLAM HYDROCHLORIDE 1 MG/ML
INJECTION INTRAMUSCULAR; INTRAVENOUS
Status: DISCONTINUED | OUTPATIENT
Start: 2025-07-18 | End: 2025-07-18

## 2025-07-18 RX ORDER — LIDOCAINE HYDROCHLORIDE 20 MG/ML
INJECTION INTRAVENOUS
Status: DISCONTINUED | OUTPATIENT
Start: 2025-07-18 | End: 2025-07-18

## 2025-07-18 RX ORDER — FENTANYL CITRATE 50 UG/ML
INJECTION, SOLUTION INTRAMUSCULAR; INTRAVENOUS
Status: DISCONTINUED | OUTPATIENT
Start: 2025-07-18 | End: 2025-07-18

## 2025-07-18 RX ORDER — CEFAZOLIN SODIUM 1 G/3ML
INJECTION, POWDER, FOR SOLUTION INTRAMUSCULAR; INTRAVENOUS
Status: DISCONTINUED | OUTPATIENT
Start: 2025-07-18 | End: 2025-07-18

## 2025-07-18 RX ADMIN — FENTANYL CITRATE 25 MCG: 50 INJECTION INTRAMUSCULAR; INTRAVENOUS at 11:07

## 2025-07-18 RX ADMIN — OXYCODONE HYDROCHLORIDE 5 MG: 5 TABLET ORAL at 11:07

## 2025-07-18 RX ADMIN — CEFAZOLIN 2 G: 330 INJECTION, POWDER, FOR SOLUTION INTRAMUSCULAR; INTRAVENOUS at 09:07

## 2025-07-18 RX ADMIN — Medication 20 MG: at 09:07

## 2025-07-18 RX ADMIN — FENTANYL CITRATE 50 MCG: 50 INJECTION, SOLUTION INTRAMUSCULAR; INTRAVENOUS at 09:07

## 2025-07-18 RX ADMIN — Medication 10 MG: at 10:07

## 2025-07-18 RX ADMIN — PROPOFOL 100 MCG/KG/MIN: 10 INJECTION, EMULSION INTRAVENOUS at 09:07

## 2025-07-18 RX ADMIN — PROPOFOL 70 MG: 10 INJECTION, EMULSION INTRAVENOUS at 09:07

## 2025-07-18 RX ADMIN — LIDOCAINE HYDROCHLORIDE 100 MG: 20 INJECTION INTRAVENOUS at 09:07

## 2025-07-18 RX ADMIN — FENTANYL CITRATE 25 MCG: 50 INJECTION, SOLUTION INTRAMUSCULAR; INTRAVENOUS at 10:07

## 2025-07-18 RX ADMIN — SODIUM CHLORIDE, POTASSIUM CHLORIDE, SODIUM LACTATE AND CALCIUM CHLORIDE: 600; 310; 30; 20 INJECTION, SOLUTION INTRAVENOUS at 08:07

## 2025-07-18 RX ADMIN — FENTANYL CITRATE 25 MCG: 50 INJECTION, SOLUTION INTRAMUSCULAR; INTRAVENOUS at 09:07

## 2025-07-18 RX ADMIN — ACETAMINOPHEN 1000 MG: 10 INJECTION INTRAVENOUS at 09:07

## 2025-07-18 RX ADMIN — MIDAZOLAM HYDROCHLORIDE 2 MG: 2 INJECTION, SOLUTION INTRAMUSCULAR; INTRAVENOUS at 09:07

## 2025-07-18 NOTE — DISCHARGE INSTRUCTIONS
PAIN MANAGEMENT    HOME CARE INSTRUCTIONS   Do not use heat (such as a heating pad) for 24 hours.  You may apply an ice pack to the injection site for 20 minutes at a time for the first 24 hours for soreness/discomfort at injection site   Keep site clean and dry for 24 hours. If bandaid is present, remove when desired.              Do not soak for 48 hrs.   Do not drive until tomorrow.  Take care when walking after a lumbar injection.   Resume home medication as prescribed today.  Resume Aspirin, Plavix, or Coumadin the day after the procedure unless other wise instructed.    STEROIDS OR RADIOFREQUENCY    May take 10-14 days for full effects.  Avoid strenuous exercises for 2 days.    .      CALL PHYSICIAN FOR:  Severe increase in your usual pain or the appearance of new pain.  Prolonged or increasing weakness or numbness in the legs or arms.  Fever greater than 100 degrees F.  Drainage, redness, active bleeding, or increased swelling at the injection site.  Headache that increases when your head is upright and decreases when you lie flat.    FOR EMERGENCIES:   Go directly to the emergency department for any shortness of breath, chest pain, or problems breathing.     Discharge Instructions: After Your Surgery/Procedure  Youve just had surgery. During surgery you were given medicine called anesthesia to keep you relaxed and free of pain. After surgery you may have some pain or nausea. This is common. Here are some tips for feeling better and getting well after surgery.     Stay on schedule with your medication.   Going home  Your doctor or nurse will show you how to take care of yourself when you go home. He or she will also answer your questions. Have an adult family member or friend drive you home.      For your safety we recommend these precaution for the first 24 hours after your procedure:  Do not drive or use heavy equipment.  Do not make important decisions or sign legal papers.  Do not drink  "alcohol.  Have someone stay with you, if needed. He or she can watch for problems and help keep you safe.  Your concentration, balance, coordination, and judgement may be impaired for many hours after anesthesia.  Use caution when ambulating or standing up.     You may feel weak and "washed out" after anesthesia and surgery.      Subtle residual effects of general anesthesia or sedation with regional / local anesthesia can last more than 24 hours.  Rest for the remainder of the day or longer if your Doctor/Surgeon has advised you to do so.  Although you may feel normal within the first 24 hours, your reflexes and mental ability may be impaired without you realizing it.  You may feel dizzy, lightheaded or sleepy for 24 hours or longer.      Be sure to go to all follow-up visits with your doctor. And rest after your surgery for as long as your doctor tells you to.  Coping with pain  If you have pain after surgery, pain medicine will help you feel better. Take it as told, before pain becomes severe. Also, ask your doctor or pharmacist about other ways to control pain. This might be with heat, ice, or relaxation. And follow any other instructions your surgeon or nurse gives you.  Tips for taking pain medicine  To get the best relief possible, remember these points:  Pain medicines can upset your stomach. Taking them with a little food may help.  Most pain relievers taken by mouth need at least 20 to 30 minutes to start to work.  Taking medicine on a schedule can help you remember to take it. Try to time your medicine so that you can take it before starting an activity. This might be before you get dressed, go for a walk, or sit down for dinner.  Constipation is a common side effect of pain medicines. Call your doctor before taking any medicines such as laxatives or stool softeners to help ease constipation. Also ask if you should skip any foods. Drinking lots of fluids and eating foods such as fruits and vegetables that " are high in fiber can also help. Remember, do not take laxatives unless your surgeon has prescribed them.  Drinking alcohol and taking pain medicine can cause dizziness and slow your breathing. It can even be deadly. Do not drink alcohol while taking pain medicine.  Pain medicine can make you react more slowly to things. Do not drive or run machinery while taking pain medicine.  Your health care provider may tell you to take acetaminophen to help ease your pain. Ask him or her how much you are supposed to take each day. Acetaminophen or other pain relievers may interact with your prescription medicines or other over-the-counter (OTC) drugs. Some prescription medicines have acetaminophen and other ingredients. Using both prescription and OTC acetaminophen for pain can cause you to overdose. Read the labels on your OTC medicines with care. This will help you to clearly know the list of ingredients, how much to take, and any warnings. It may also help you not take too much acetaminophen. If you have questions or do not understand the information, ask your pharmacist or health care provider to explain it to you before you take the OTC medicine.  Managing nausea  Some people have an upset stomach after surgery. This is often because of anesthesia, pain, or pain medicine, or the stress of surgery. These tips will help you handle nausea and eat healthy foods as you get better. If you were on a special food plan before surgery, ask your doctor if you should follow it while you get better. These tips may help:  Do not push yourself to eat. Your body will tell you when to eat and how much.  Start off with clear liquids and soup. They are easier to digest.  Next try semi-solid foods, such as mashed potatoes, applesauce, and gelatin, as you feel ready.  Slowly move to solid foods. Dont eat fatty, rich, or spicy foods at first.  Do not force yourself to have 3 large meals a day. Instead eat smaller amounts more often.  Take  pain medicines with a small amount of solid food, such as crackers or toast, to avoid nausea.     Call your surgeon if  You still have pain an hour after taking medicine. The medicine may not be strong enough.  You feel too sleepy, dizzy, or groggy. The medicine may be too strong.  You have side effects like nausea, vomiting, or skin changes, such as rash, itching, or hives.       If you have obstructive sleep apnea  You were given anesthesia medicine during surgery to keep you comfortable and free of pain. After surgery, you may have more apnea spells because of this medicine and other medicines you were given. The spells may last longer than usual.   At home:  Keep using the continuous positive airway pressure (CPAP) device when you sleep. Unless your health care provider tells you not to, use it when you sleep, day or night. CPAP is a common device used to treat obstructive sleep apnea.  Talk with your provider before taking any pain medicine, muscle relaxants, or sedatives. Your provider will tell you about the possible dangers of taking these medicines.  © 7551-3549 [employer]. [address] [place] [address], [place] [address]. All rights reserved. This information is not intended as a substitute for professional medical care. Always follow your healthcare professional's instructions.

## 2025-07-18 NOTE — DISCHARGE SUMMARY
[facility] - Surgery  Discharge Note  Short Stay    Procedure(s) (LRB):  DESTRUCTION,INTRAOSSEOUS BASIVERTEBRAL NERVE,1ST TWO BODIES,LUM/SAC L5 and S1 (N/A)      OUTCOME: Patient tolerated treatment/procedure well without complication and is now ready for discharge.    DISPOSITION: Home or Self Care    FINAL DIAGNOSIS:  Vertebrogenic low back pain    FOLLOWUP: In clinic    DISCHARGE INSTRUCTIONS:    Discharge Procedure Orders   Diet Adult Regular     No dressing needed     Notify your health care provider if you experience any of the following:  temperature >100.4     Activity as tolerated

## 2025-07-18 NOTE — TRANSFER OF CARE
"Anesthesia Transfer of Care Note    Patient: Phill May    Procedure(s) Performed: Procedure(s) (LRB):  DESTRUCTION,INTRAOSSEOUS BASIVERTEBRAL NERVE,1ST TWO BODIES,LUM/SAC L5 and S1 (N/A)    Patient location: PACU    Anesthesia Type: MAC    Transport from OR: Transported from OR on room air with adequate spontaneous ventilation    Post pain: adequate analgesia    Post assessment: no apparent anesthetic complications and tolerated procedure well    Post vital signs: stable    Level of consciousness: awake, alert and oriented    Nausea/Vomiting: no nausea/vomiting    Complications: none    Transfer of care protocol was followed      Last vitals: Visit Vitals  /63 (BP Location: Right arm, Patient Position: Lying)   Pulse 95   Temp 36.3 °C (97.4 °F) (Skin)   Resp 16   Ht 5' 8" (1.727 m)   Wt 77.1 kg (170 lb)   LMP 07/04/2025   SpO2 100%   Breastfeeding No   BMI 25.85 kg/m²     "

## 2025-07-18 NOTE — H&P
CC: Back pain    HPI: The patient is a 34yo woman with a history of vertebrogenic low back pain here for L5 and S1 basivertebral nerve ablation. There are no major changes in history and physical from 6/13/25.    Past Medical History:   Diagnosis Date    Migraine     PCOS (polycystic ovarian syndrome)        Past Surgical History:   Procedure Laterality Date    EPIDURAL STEROID INJECTION INTO CERVICAL SPINE N/A 5/31/2021    Procedure: Injection-steroid-epidural-cervical C6/7 spread to the right;  Surgeon: Jigar Gay MD;  Location: [facility] OR;  Service: Pain Management;  Laterality: N/A;    EPIDURAL STEROID INJECTION INTO LUMBAR SPINE N/A 8/26/2021    Procedure: Injection-steroid-epidural-lumbar l5/S1 interlaminar;  Surgeon: Jigar aGy MD;  Location: [facility] OR;  Service: Pain Management;  Laterality: N/A;    EPIDURAL STEROID INJECTION INTO LUMBAR SPINE N/A 11/18/2022    Procedure: Injection-steroid-epidural-lumbar L5/S1;  Surgeon: Jigar Gay MD;  Location: [facility] OR;  Service: Pain Management;  Laterality: N/A;    INJECTION OF ANESTHETIC AGENT AROUND MEDIAL BRANCH NERVES INNERVATING LUMBAR FACET JOINT Bilateral 10/18/2023    Procedure: Block-nerve-medial branch-lumbar Bilat L4/5 and L5/S1;  Surgeon: Jigar Gay MD;  Location: [facility] OR;  Service: Pain Management;  Laterality: Bilateral;  Bilat L4/5 and L5/S1    INJECTION OF ANESTHETIC AGENT AROUND MEDIAL BRANCH NERVES INNERVATING LUMBAR FACET JOINT Bilateral 11/20/2023    Procedure: BLOCK, NERVE, FACET JOINT, LUMBAR, MEDIAL BRANCH Bilat L4/5 and L5/S1;  Surgeon: Jigar Gay MD;  Location: [facility] OR;  Service: Pain Management;  Laterality: Bilateral;    INJECTION, SPINE, LUMBOSACRAL, TRANSFORAMINAL APPROACH Left 4/23/2025    Procedure: INJECTION, SPINE, LUMBOSACRAL, TRANSFORAMINAL APPROACH L5/S1 and S/1;  Surgeon: Jigar Gay MD;  Location: [facility] OR;  Service: Pain Management;  Laterality: Left;    MOUTH SURGERY       RADIOFREQUENCY ABLATION OF LUMBAR MEDIAL BRANCH NERVE AT SINGLE LEVEL Bilateral 1/26/2024    Procedure: Radiofrequency Ablation, Nerve, Spinal, Lumbar, Medial Branch, L4/5 and L5/S1;  Surgeon: Jigar Gay MD;  Location: [facility] OR;  Service: Pain Management;  Laterality: Bilateral;    TRANSFORAMINAL EPIDURAL INJECTION OF STEROID Left 10/21/2021    Procedure: Injection,steroid,epidural,transforaminal approach L5/S1;  Surgeon: Jigar Gay MD;  Location: [facility] OR;  Service: Pain Management;  Laterality: Left;    TRANSFORAMINAL EPIDURAL INJECTION OF STEROID Left 12/6/2021    Procedure: Injection,steroid,epidural,transforaminal approach L5/S1;  Surgeon: Jigar Gay MD;  Location: [facility] OR;  Service: Pain Management;  Laterality: Left;    TRANSFORAMINAL EPIDURAL INJECTION OF STEROID Left 2/21/2022    Procedure: Injection,steroid,epidural,transforaminal approach L5/S1;  Surgeon: Jigar Gay MD;  Location: [facility] OR;  Service: Pain Management;  Laterality: Left;    TRANSFORAMINAL EPIDURAL INJECTION OF STEROID Left 5/3/2024    Procedure: Injection,steroid,epidural,transforaminal approach    L5/S1 and S1;  Surgeon: Jigar Gay MD;  Location: [facility] OR;  Service: Pain Management;  Laterality: Left;    TRANSFORAMINAL EPIDURAL INJECTION OF STEROID Left 10/23/2024    Procedure: Injection,steroid,epidural,transforaminal approach L5/S1 and S1;  Surgeon: Jigar Gay MD;  Location: [facility] OR;  Service: Pain Management;  Laterality: Left;    TRANSFORAMINAL EPIDURAL INJECTION OF STEROID Left 4/7/2025    Procedure: Injection,steroid,epidural,transforaminal approach  L5/S1 and S1;  Surgeon: Jigar Gay MD;  Location: [facility] OR;  Service: Pain Management;  Laterality: Left;  normal    VAGINAL DELIVERY      x3    WISDOM TOOTH EXTRACTION         Family History   Problem Relation Name Age of Onset    Cancer Father         Social History     Socioeconomic History    Marital status:   "  Tobacco Use    Smoking status: Never    Smokeless tobacco: Never   Substance and Sexual Activity    Alcohol use: No    Drug use: No    Sexual activity: Yes     Partners: Male       Current Medications[1]    Review of patient's allergies indicates:   Allergen Reactions    No known drug allergies        Vitals:    07/14/25 1001 07/18/25 0806   BP:  107/63   Pulse:  95   Resp:  16   Temp:  97.4 °F (36.3 °C)   TempSrc:  Skin   SpO2:  100%   Weight: 78 kg (172 lb) 77.1 kg (170 lb)   Height: 5' 7" (1.702 m) 5' 8" (1.727 m)       ASA 2, Mallampati 2    REVIEW OF SYSTEMS:     GENERAL: No weight loss, malaise or fevers.  HEENT:  No recent changes in vision or hearing  NECK: Negative for lumps, no difficulty with swallowing.  RESPIRATORY: Negative for cough, wheezing or shortness of breath, patient denies any recent URI.  CARDIOVASCULAR: Negative for chest pain, leg swelling or palpitations.  GI: Negative for abdominal discomfort, blood in stools or black stools or change in bowel habits.  MUSCULOSKELETAL: See HPI.  SKIN: Negative for lesions, rash, and itching.  PSYCH: No suicidal or homicidal ideations, no current mood disturbances.  HEMATOLOGY/LYMPHOLOGY: Negative for prolonged bleeding, bruising easily or swollen nodes. Patient is not currently taking any anti-coagulants  ENDO: No history of diabetes or thyroid dysfunction  NEURO: No history of syncope, paralysis, seizures or tremors.All other reviewed and negative other than HPI.    Physical exam:  Gen: A and O x3, pleasant, well-groomed  Skin: No rashes or obvious lesions  HEENT: PERRLA, no obvious deformities on ears or in canals. No thyroid masses, trachea midline, no palpable lymph nodes in neck, axilla.  CVS: Regular rate and rhythm, normal S1 and S2, no murmurs.  Resp: Clear to auscultation bilaterally.  Abdomen: Soft, NT/ND, normal bowel sounds present.  Musculoskeletal/Neuro: Moving all extremities    Assessment:  Vertebrogenic pain  -     Place in Outpatient; " Standing  -     Vital signs; Standing  -     Place 18-22 gauage peripheral IV ; Standing  -     Verify informed consent; Standing  -     Notify physician ; Standing  -     Notify physician ; Standing  -     Notify physician (specify); Standing  -     Notify physician (specify); Standing  -     Notify physician (specify); Standing  -     Diet NPO; Standing  -     lactated ringers infusion  -     POCT urine pregnancy; Standing    Vertebrogenic low back pain  -     Place in Outpatient; Standing  -     Vital signs; Standing  -     Place 18-22 gauage peripheral IV ; Standing  -     Verify informed consent; Standing  -     Notify physician ; Standing  -     Notify physician ; Standing  -     Notify physician (specify); Standing  -     Notify physician (specify); Standing  -     Notify physician (specify); Standing  -     Diet NPO; Standing  -     lactated ringers infusion  -     POCT urine pregnancy; Standing    Other orders  -     Vital signs; Standing  -     Insert peripheral IV; Standing  -     Use 1% lidocaine at IV site; Standing  -     Notify Anesthesiologist if Patient on Home Insulin Pump; Standing  -     LIDOcaine (PF) 10 mg/ml (1%) injection 10 mg  -     Admit to Phase I recovery, transfer to phase II level of care when Olu score is 9 out of 10; Standing  -     Vital signs; Standing  -     fentaNYL 50 mcg/mL injection 25 mcg; Refill: 0  -     oxyCODONE immediate release tablet 5 mg; Refill: 0  -     Intake and output Per protocol; Standing  -     Apply warming blanket; Standing  -     Discharge home from Phase II when PADSS scoring system score is 9 to 10 on PADSS Scoring system met; Standing  -     POCT glucose; Standing  -     lactated ringers infusion  -     lactated ringers infusion  -     ondansetron injection 4 mg  -     IP VTE LOW RISK PATIENT; Standing               [1]   Current Facility-Administered Medications   Medication Dose Route Frequency Provider Last Rate Last Admin    lactated ringers  infusion   Intravenous Continuous Errol Perez MD 10 mL/hr at 07/18/25 0816 New Bag at 07/18/25 0816    lactated ringers infusion   Intravenous Continuous Jigar Gay MD        LIDOcaine (PF) 10 mg/ml (1%) injection 10 mg  1 mL Intradermal Once Errol Perez MD

## 2025-07-18 NOTE — OP NOTE
Patient Name: Phill May  MRN: [id number]    INFORMED CONSENT: The procedure, risks, benefits and options were discussed with patient. There are no contraindications to the procedure. The patient expressed understanding and agreed to proceed. The personnel performing the procedure was discussed. I verify that I personally obtained Phill's consent prior to the start of the procedure and the signed consent can be found on the patient's chart.        Procedure Date: 7/18/2025  Surgeons and Role:     * Jigar Gay MD - Primary      Pre Procedure diagnosis: Vertebrogenic pain [M54.89]  Vertebrogenic low back pain [M54.51]  1. Vertebrogenic pain    2. Vertebrogenic low back pain      Post-Procedure diagnosis: SAME    Sedation: MAC per anesthesia      PROCEDURE: Intracept Procedure at L5 and S1  SURGEON:DR. Jigar Gay MD  OPERATION:               1. Intracept procedure L5 and S1 levels              2. Radiofrequency ablation        PREOPERATIVE ANTIBIOTICS: 2 IV ancef given 30 minutes prior to incision, see anesthesia record for time.    OPERATIVE PROCEDURE: The patient was brought to the operating room suite. The patient was positioned prone on the pain table. The back was prepped and draped. Two image intensifiers (C-arm) were brought into position and the right L5 and left S1  pedicles were identified and marked with a skin marker. The C-arm, was turned oblique to visualize the pedicle and superior border of the right pedicle was marked. The site was anesthestized with Lidocaine 1%. A 1 cm paramedian incision was made. The osteointroducer with bevel was placed and advanced through the pedicle with AP and lateral fluoroscopy imaging guidance. Once the needle was at the junction of the pedicle and the vertebral body, a lateral image was taken to insure that the cannula was positioned just past the vertebral body wall. Through the cannula, a curved and straight stylet was advanced into the vertebral  body under fluoroscopic guidance creating a channel. The radiopaque marker bands on the stylet were identified using AP and lateral images.   After completing the entry into the vertebral body, a radiofrequency probe was inserted through the cannula and advanced under fluoroscopic guidance. The radiopaque marker bands on the probe were identified using AP and lateral images. Then radiofrequency ablation was performed for 15 minutes. Once the ablation was completed, the curved needle and cannula was then removed.           Post-procedure, all incisions were closed with bandages. PATIENT was moving lower extremities at rewcovery and neurological exam was unchanged from pre-procedure.     COMPLICATIONS: There were no complications.    Treatment plan was discussed with the patient. Post procedure instructions were reviewed and the patient voiced understanding.    Blood Loss: Nill  Specimen: None    Jigar Gay MD

## 2025-07-18 NOTE — ANESTHESIA PREPROCEDURE EVALUATION
07/18/2025  Phill May is a 35 y.o., female.      Pre-op Assessment          Review of Systems  Pulmonary:    Asthma       Asthma:               Neurological:    Neuromuscular Disease,  Headaches      Dx of Headaches                         Neuromuscular Disease   Psych:  Psychiatric History                  Physical Exam  General: Well nourished        Anesthesia Plan  Type of Anesthesia, risks & benefits discussed:    Anesthesia Type: Gen Natural Airway, MAC  Intra-op Monitoring Plan: Standard ASA Monitors  Post Op Pain Control Plan: multimodal analgesia and IV/PO Opioids PRN  Induction:  IV  Informed Consent: Informed consent signed with the Patient and all parties understand the risks and agree with anesthesia plan.  All questions answered.   ASA Score: 2  Day of Surgery Review of History & Physical: H&P Update referred to the surgeon/provider.    Ready For Surgery From Anesthesia Perspective.     .

## 2025-07-19 ENCOUNTER — PATIENT MESSAGE (OUTPATIENT)
Dept: PAIN MEDICINE | Facility: CLINIC | Age: 35
End: 2025-07-19
Payer: OTHER GOVERNMENT

## 2025-07-21 RX ORDER — METHYLPREDNISOLONE 4 MG/1
TABLET ORAL
Qty: 21 EACH | Refills: 0 | Status: SHIPPED | OUTPATIENT
Start: 2025-07-21 | End: 2025-08-11

## 2025-07-21 NOTE — ANESTHESIA POSTPROCEDURE EVALUATION
Anesthesia Post Evaluation    Patient: Phill May    Procedure(s) Performed: Procedure(s) (LRB):  DESTRUCTION,INTRAOSSEOUS BASIVERTEBRAL NERVE,1ST TWO BODIES,LUM/SAC L5 and S1 (N/A)    Final Anesthesia Type: MAC      Patient location during evaluation: PACU  Patient participation: Yes- Able to Participate  Level of consciousness: awake and alert  Post-procedure vital signs: reviewed and stable  Pain management: adequate  Airway patency: patent    PONV status at discharge: No PONV  Anesthetic complications: no      Cardiovascular status: blood pressure returned to baseline  Respiratory status: unassisted and room air  Hydration status: euvolemic  Follow-up not needed.              Vitals Value Taken Time   /57 07/18/25 11:30   Temp 36.7 °C (98 °F) 07/18/25 10:55   Pulse 86 07/18/25 11:30   Resp 13 07/18/25 11:30   SpO2 100 % 07/18/25 11:30         Event Time   Out of Recovery 11:29:00         Pain/Olu Score: No data recorded

## 2025-07-21 NOTE — TELEPHONE ENCOUNTER
I spoke to the patient, she is having pain in the right side of her back, going into the buttock.  We discussed that this could be nerve pain, I sent a Medrol Dosepak for her.

## 2025-07-25 ENCOUNTER — OFFICE VISIT (OUTPATIENT)
Dept: PAIN MEDICINE | Facility: CLINIC | Age: 35
End: 2025-07-25
Payer: OTHER GOVERNMENT

## 2025-07-25 VITALS
DIASTOLIC BLOOD PRESSURE: 74 MMHG | HEIGHT: 68 IN | HEART RATE: 107 BPM | BODY MASS INDEX: 25.81 KG/M2 | WEIGHT: 170.31 LBS | SYSTOLIC BLOOD PRESSURE: 117 MMHG

## 2025-07-25 DIAGNOSIS — M54.16 LUMBAR RADICULOPATHY: ICD-10-CM

## 2025-07-25 DIAGNOSIS — M54.89 VERTEBROGENIC PAIN: Primary | ICD-10-CM

## 2025-07-25 PROCEDURE — 99999 PR PBB SHADOW E&M-EST. PATIENT-LVL IV: CPT | Mod: PBBFAC,,, | Performed by: PHYSICIAN ASSISTANT

## 2025-07-25 PROCEDURE — 99214 OFFICE O/P EST MOD 30 MIN: CPT | Mod: PBBFAC,PO | Performed by: PHYSICIAN ASSISTANT

## 2025-07-25 NOTE — PROGRESS NOTES
[facility] Follow Up    This note was completed with dictation software and grammatical errors may exist.    CC:  back pain    HPI:    The patient is a 35-year-old woman with a history of cervical DDD who presented in referral from [facility] for cervical radiculopathy.  She is new to me and previously followed by Dr. Gay and colleagues. She is s/p 7/18/25 L5, S1 intracept procedure.  Oriented to back pain, she does feel some improvement already.  After the procedure on 07/19/2025, she reached out to Dr. Gay for right leg pain.  She was prescribed Medrol taper.  While she has seen some improvement, is not completely resolved.  Into cassette procedure sites healing well no redness irritation or bruising.  She denies any fever.    HPI 6-13-25 with Hadley Spear:  Patient presents for follow-up of ongoing back and leg pain, predominantly in the back radiating to the left leg. Her back pain is more intense, while leg pain is described as a tingling sensation. Pain distribution is approximately 85% back pain and 15% left leg pain.  Her pain worsens with prolonged standing, activities like mopping, and car rides, forcing breaks or stopping entirely. It is most noticeable in the morning, with her experiencing significant discomfort upon waking. Pain disturbs sleep and is particularly painful when bending backwards.  Her symptoms have worsened since her last MRI about 2.5 years ago. The current MRI shows progression of disc degeneration and changes in disc protrusion, particularly at the L5-S1 level.    Prior history:   She presents in follow-up for neck pain.  She was sent for direct procedure from to [facility].  She is status post C6/7 interlaminar ZAY on 05/31/2021 with 50% improvement, almost complete improvement of her right arm numbness and tingling however.  She reports that her neck pain began in March, 2021 when she was throwing a medicine ball over her head.  She had  sudden pain in the neck, right shoulder and began having numbness and tingling in her right arm.  She reports that pain is aching, numb, sharp, shooting, tingling and deep.  She states that her pain was much worse when having to drive but also with running which she needs to do for her physical fitness test.  She does get some relief with physical therapy, medications including ibuprofen, Mobic and stretching helps.  She does report having chronic migraine headaches, has not seen neurology for this.  In the last year because of her neck pain this seems to have exacerbated her headaches.    Her other complaint is back pain in the mid and low back for the last year.  Particularly in the low back, radiates out to the lateral hips, sometimes radiates along her posterior thighs into her calves.  This pain is worse with running, also worse with sitting too long and when moving from sitting to standing.  She denies any constant numbness, no bowel or bladder incontinence.    Pain intervention history:   She is status post C6/7 interlaminar ZAY on 05/31/2021 with 50% improvement of neck pain, almost 90% improvement of right arm pain.  She is status post L5/S1 ZAY on 08/26/2021 with No major benefit.     She is status post left L5/S1 transforaminal epidural steroid injection on 10/21/2021 with 50% relief.     She is status post left L5/S1 transforaminal epidural steroid injection on 12/06/2021 with additional relief for 3 weeks.     She is status post left L5/S1 transforaminal epidural steroid injection on 02/21/2022 with about 50% relief.     She is status post left L5/S1 transforaminal epidural steroid injection on 02/21/2022 with a couple days of relief, now reporting 0% relief.     She is status post bilateral L4/5 and L5/S1 medial branch radiofrequency ablation on 01/26/2024 reporting almost complete relief for the 1st 3 and half weeks but then developed radicular symptoms.      She is status post left L5/S1 and S1  transforaminal epidural steroid injection on 05/03/2024 with 50-60% relief lasting about 4 and half months.  She is status post left L5 and S1 transforaminal epidural steroid injection on 04/23/2025 with 30-35% relief.  She is status post L5, S1 interested procedure 7-18-25    Spine surgeries:None    MEdications and PT  Antineuropathics:  Gabapentin 600 mg nightly  NSAIDs:  Mobic, ibuprofen both provide 30-50% relief.  Currently taking nabumetone 750 mg twice daily as needed  Physical therapy:  She has gone to [facility] but this did not give lasting relief and she reports having some increased pain afterwards.  She does continue her home exercise program.  Antidepressants:  Muscle relaxers:  Tizanidine 2 mg nightly as needed  Opioids:  Antiplatelets/Anticoagulants:    ROS:  She reports fatigability, headaches and back pain.  Balance of review of systems is negative.    Lab Results   Component Value Date    HGBA1C 5.3 09/25/2023       Lab Results   Component Value Date    WBC 7.31 01/30/2025    HGB 13.9 01/30/2025    HCT 43.3 01/30/2025    MCV 90 01/30/2025     01/30/2025             Past Medical History:   Diagnosis Date    Migraine     PCOS (polycystic ovarian syndrome)        Past Surgical History:   Procedure Laterality Date    DESTRUCTION, INTRAOSSEOUS BASIVERTEBRAL NERVE, 1ST TWO BODIES, LUM/SAC N/A 7/18/2025    Procedure: DESTRUCTION,INTRAOSSEOUS BASIVERTEBRAL NERVE,1ST TWO BODIES,LUM/SAC L5 and S1;  Surgeon: Jigar Gay MD;  Location: [facility] OR;  Service: Pain Management;  Laterality: N/A;    EPIDURAL STEROID INJECTION INTO CERVICAL SPINE N/A 5/31/2021    Procedure: Injection-steroid-epidural-cervical C6/7 spread to the right;  Surgeon: Jigar Gay MD;  Location: [facility] OR;  Service: Pain Management;  Laterality: N/A;    EPIDURAL STEROID INJECTION INTO LUMBAR SPINE N/A 8/26/2021    Procedure: Injection-steroid-epidural-lumbar l5/S1 interlaminar;  Surgeon: Jigar Gay  MD;  Location: [facility] OR;  Service: Pain Management;  Laterality: N/A;    EPIDURAL STEROID INJECTION INTO LUMBAR SPINE N/A 11/18/2022    Procedure: Injection-steroid-epidural-lumbar L5/S1;  Surgeon: Jigar Gay MD;  Location: [facility] OR;  Service: Pain Management;  Laterality: N/A;    INJECTION OF ANESTHETIC AGENT AROUND MEDIAL BRANCH NERVES INNERVATING LUMBAR FACET JOINT Bilateral 10/18/2023    Procedure: Block-nerve-medial branch-lumbar Bilat L4/5 and L5/S1;  Surgeon: Jigar Gay MD;  Location: [facility] OR;  Service: Pain Management;  Laterality: Bilateral;  Bilat L4/5 and L5/S1    INJECTION OF ANESTHETIC AGENT AROUND MEDIAL BRANCH NERVES INNERVATING LUMBAR FACET JOINT Bilateral 11/20/2023    Procedure: BLOCK, NERVE, FACET JOINT, LUMBAR, MEDIAL BRANCH Bilat L4/5 and L5/S1;  Surgeon: Jigar Gay MD;  Location: [facility] OR;  Service: Pain Management;  Laterality: Bilateral;    INJECTION, SPINE, LUMBOSACRAL, TRANSFORAMINAL APPROACH Left 4/23/2025    Procedure: INJECTION, SPINE, LUMBOSACRAL, TRANSFORAMINAL APPROACH L5/S1 and S/1;  Surgeon: Jigar Gay MD;  Location: [facility] OR;  Service: Pain Management;  Laterality: Left;    MOUTH SURGERY      RADIOFREQUENCY ABLATION OF LUMBAR MEDIAL BRANCH NERVE AT SINGLE LEVEL Bilateral 1/26/2024    Procedure: Radiofrequency Ablation, Nerve, Spinal, Lumbar, Medial Branch, L4/5 and L5/S1;  Surgeon: Jigar Gay MD;  Location: [facility] OR;  Service: Pain Management;  Laterality: Bilateral;    TRANSFORAMINAL EPIDURAL INJECTION OF STEROID Left 10/21/2021    Procedure: Injection,steroid,epidural,transforaminal approach L5/S1;  Surgeon: Jigar Gay MD;  Location: [facility] OR;  Service: Pain Management;  Laterality: Left;    TRANSFORAMINAL EPIDURAL INJECTION OF STEROID Left 12/6/2021    Procedure: Injection,steroid,epidural,transforaminal approach L5/S1;  Surgeon: Jigar Gay MD;  Location: [facility] OR;  Service: Pain Management;  Laterality: Left;     "TRANSFORAMINAL EPIDURAL INJECTION OF STEROID Left 2/21/2022    Procedure: Injection,steroid,epidural,transforaminal approach L5/S1;  Surgeon: Jigar Gay MD;  Location: [facility] OR;  Service: Pain Management;  Laterality: Left;    TRANSFORAMINAL EPIDURAL INJECTION OF STEROID Left 5/3/2024    Procedure: Injection,steroid,epidural,transforaminal approach    L5/S1 and S1;  Surgeon: Jigar Gay MD;  Location: [facility] OR;  Service: Pain Management;  Laterality: Left;    TRANSFORAMINAL EPIDURAL INJECTION OF STEROID Left 10/23/2024    Procedure: Injection,steroid,epidural,transforaminal approach L5/S1 and S1;  Surgeon: Jigar Gay MD;  Location: [facility] OR;  Service: Pain Management;  Laterality: Left;    TRANSFORAMINAL EPIDURAL INJECTION OF STEROID Left 4/7/2025    Procedure: Injection,steroid,epidural,transforaminal approach  L5/S1 and S1;  Surgeon: Jigar Gay MD;  Location: [facility] OR;  Service: Pain Management;  Laterality: Left;  normal    VAGINAL DELIVERY      x3    WISDOM TOOTH EXTRACTION         Social History     Socioeconomic History    Marital status:    Tobacco Use    Smoking status: Never    Smokeless tobacco: Never   Substance and Sexual Activity    Alcohol use: No    Drug use: No    Sexual activity: Yes     Partners: Male         Medications/Allergies: See med card    Vitals:    07/25/25 0946   BP: 117/74   Pulse: 107   Weight: 77.3 kg (170 lb 4.9 oz)   Height: 5' 8" (1.727 m)   PainSc:   7   PainLoc: Back         7/25/2025     9:44 AM 6/13/2025     8:00 AM 5/26/2025     4:06 PM   Last 3 PDI Scores   Pain Disability Index (PDI) [phone]           Physical exam:  Gen: A and O x3, pleasant, well-groomed  Skin: No rashes or obvious lesions  HEENT: PERRLA, no obvious deformities on ears or in canals. Trachea midline.  CVS: Regular rate and rhythm, normal palpable pulses.  Resp:No increased work of breathing, symmetrical chest rise.  Abdomen: Soft, NT/ND.  Musculoskeletal:No antalgic " gait.     Neuro:    Iliopsoas Quadriceps Knee  Flexion Tibialis  anterior Gastro- cnemius EHL   Lower: R 5/5 5/5 5/5 5/5 5/5 5/5    L 4+/5 5/5 5/5 5/5 5/5 5/5      Left  Right    Patellar DTR 2+ 1+   Achilles DTR 2+ 1+   Munoz Absent  Absent   Clonus Absent Absent   Babinski Absent Absent     Intact and symmetrical to light touch and pinprick in L1-S1 dermatomes bilaterally.    Lumbar spine:  Lumbar spine:  Range of motion is moderately decreased with both flexion and extension with increased left low back pain during each maneuver.  Frandy's test causes no increased pain on either side.    Supine straight leg raise low back and left leg pain.  Internal and external rotation of the hip causes no increased pain on either side.  Myofascial exam:  Moderate tenderness to palpation to the left lower lumbar paraspinous musculature and left upper buttock.    Incision sites dry nonerythematous, nontender, nonedematous    Imagin21 MRI C-spine:  C2-C3: No disc herniation or significant posterior osseous ridging. No significant spinal canal or foraminal stenosis.   C3-C4: No disc herniation or significant posterior osseous ridging. No significant spinal canal or foraminal stenosis.   C4-C5: Mild disc osteophyte complex.  Slight ventral cord flattening with preserved ventral and dorsal CSF.  No significant foraminal stenosis.   C5-C6: Mild disc osteophyte complex.  Slight ventral cord flattening with preserved ventral and dorsal CSF.  No significant foraminal stenosis.   C6-C7: Mild disc osteophyte complex.  Preserved ventral and dorsal CSF.  No significant foraminal stenosis.   C7-T1: No disc herniation or significant posterior osseous ridging. No significant spinal canal or foraminal stenosis.    21 MRI L-spine:  T12-L1: Unremarkable   L1-2: Unremarkable   L2-3: There is only a minimal disc bulge.  There is no spinal canal or significant foraminal stenosis.   L3-4: There is minimal bulging of the annulus.   There is also mild facet joint arthropathy.  There is flattening of the ventral dural sac.  There is borderline spinal stenosis.  The foramina are patent.   L4-5: There is a mild disc bulge slightly eccentric to the left.  There is mild facet joint arthropathy.  There is flattening of the ventral dural sac.  There is no spinal stenosis.  There is only mild bilateral foraminal stenosis without spinal stenosis.   L5-S1: There is mild disc space narrowing.  There is a mild disc bulge with superimposed broad left paracentral and proximal foraminal disc protrusion.  There is no spinal stenosis but there is crowding of the left lateral recess which could irritate the left S1 root.  Also, there is mild-to-moderate left foraminal stenosis.    12/27/22 MRI lumbar spine   Alignment: Normal.     Vertebral column: Vertebral body heights are maintained.  No evidence of an acute fracture or aggressive marrow replacement process. Moderate intervertebral disc space narrowing with disc extrusion through an annular fissure at L5-S1.  Remainder of the disc spaces are preserved.     Cord: Normal.  Conus terminates at L1.     Degenerative findings:     L1-L2: Disc is normal in configuration.  There is no facet arthropathy.  There is no neural foraminal stenosis.  There is no spinal canal stenosis.     L2-L3: Disc is normal in configuration.  There is no facet arthropathy.  There is no neural foraminal stenosis.  There is no spinal canal stenosis.     L3-L4: Minimal diffuse disc bulge.  Mild bilateral facet arthropathy.  There is no neural foraminal stenosis.  There is no spinal canal stenosis.     L4-L5: Mild diffuse disc bulge.  Mild bilateral facet arthropathy.  Mild bilateral neural foraminal stenosis.  There is no spinal canal stenosis.     L5-S1: Diffuse disc bulge with superimposed left central/subarticular disc extrusion through an annular fissure, enlarged since the prior study on 08/06/2021, which now descends approximately 11  mm to the S1 pedicular level.  This narrows the left lateral recess and abuts the descending left S1 nerve root.  Mild bilateral facet arthropathy.  Moderate left and mild right neural foraminal stenosis.    Mild spinal canal stenosis.     Paraspinal muscles & soft tissues: No significant abnormalities.    06/04/2025 MRI lumbar spine   Alignment: Within normal limits.     Vertebrae: Vertebral body heights are maintained. No acute fracture is identified. No evidence of an aggressive marrow replacement process. Advanced mixed Modic type 1/2 endplate changes with Schmorl's nodes and marginal osteophyte formation at L5-S1, progressed since 20/2.     Discs: Advanced disc degeneration at L5-S1 with severe intervertebral disc space narrowing, disc desiccation, disc bulge and superimposed disc protrusion through an annular fissure at L5-S1, progressed since 2022.  Remainder of the disc spaces are relatively maintained.     Cord: Normal.  Conus terminates at L1.     Degenerative findings:     T12-L1: Disc is normal configuration.  Mild bilateral facet arthropathy.  No neural foraminal or spinal canal stenosis.     L1-L2: Disc is normal in configuration. Mild bilateral facet arthropathy.  No neural foraminal or spinal canal stenosis.     L2-L3: Disc is normal in configuration. Mild bilateral facet arthropathy.  No neural foraminal or spinal canal stenosis.     L3-L4: Mild circumferential disc bulge.  Mild bilateral facet arthropathy and ligamentum flavum thickening.   No significant neural foraminal or spinal canal stenosis.     L4-L5: Mild circumferential disc bulge.  Mild bilateral facet arthropathy and ligamentum flavum thickening.  Mild bilateral neural foraminal stenosis.  Mild spinal canal and lateral recess stenosis.     L5-S1: Severe disc space narrowing.  Circumferential disc bulge with superimposed broad-based central to left foraminal disc protrusion through an annular fissure.  Moderate left facet arthropathy with  left facet effusion.  Ligamentum flavum thickening.  Moderate left and mild right neural foraminal stenosis.  Mild spinal canal stenosis.  Moderate left and mild right lateral recess stenosis, with possible impingement upon the descending left S1 nerve roots.     Paraspinal muscles & soft tissues: No significant abnormalities.      Assessment:  The patient is a 35-year-old woman with a history of cervical DDD who presented for follow up of back pain after intracept procedure    Status post L5, S1 interested procedure.  She has had some improvement already of lower back pain.  Not complete resolution.  Onset of increased radicular leg pain on the right side after the procedure; treated with Medrol taper with some improvement but no complete resolution at this point.    1. Vertebrogenic pain        2. Lumbar radiculopathy                Plan:  1. I recommend allowing more time for healing and pain improvement.  It could take up to 4 weeks for lower back pain to resolve completely after interested procedure.  She is already seeing some improvement which is encouraging.  Regarding right leg pain radicular symptoms with increased, it is sometimes seen where there is nerve irritation after the procedure.  I suspect with a little more time this will improve and returned back to baseline.  She is known to have some neural compression as well.  She has previously seen Neurosurgery and if radicular pain does not improve, potentially reconsider further ZAY versus return to neurosurgery  2.  Follow up about 1 month after procedure 8/18/25

## 2025-08-21 DIAGNOSIS — M47.816 LUMBAR SPONDYLOSIS: ICD-10-CM

## 2025-08-21 DIAGNOSIS — M54.16 LUMBAR RADICULOPATHY: ICD-10-CM

## 2025-08-21 RX ORDER — PREGABALIN 50 MG/1
CAPSULE ORAL
Qty: 30 CAPSULE | Refills: 2 | Status: SHIPPED | OUTPATIENT
Start: 2025-08-21

## 2025-08-29 ENCOUNTER — OFFICE VISIT (OUTPATIENT)
Dept: PRIMARY CARE CLINIC | Facility: CLINIC | Age: 35
End: 2025-08-29
Payer: OTHER GOVERNMENT

## 2025-08-29 VITALS
DIASTOLIC BLOOD PRESSURE: 82 MMHG | SYSTOLIC BLOOD PRESSURE: 120 MMHG | HEART RATE: 89 BPM | RESPIRATION RATE: 18 BRPM | WEIGHT: 168.63 LBS | OXYGEN SATURATION: 98 % | HEIGHT: 68 IN | BODY MASS INDEX: 25.56 KG/M2

## 2025-08-29 DIAGNOSIS — E66.811 CLASS 1 OBESITY DUE TO EXCESS CALORIES WITH SERIOUS COMORBIDITY AND BODY MASS INDEX (BMI) OF 30.0 TO 30.9 IN ADULT: Chronic | ICD-10-CM

## 2025-08-29 DIAGNOSIS — E66.09 CLASS 1 OBESITY DUE TO EXCESS CALORIES WITH SERIOUS COMORBIDITY AND BODY MASS INDEX (BMI) OF 30.0 TO 30.9 IN ADULT: Chronic | ICD-10-CM

## 2025-08-29 DIAGNOSIS — E28.2 PCOS (POLYCYSTIC OVARIAN SYNDROME): ICD-10-CM

## 2025-08-29 DIAGNOSIS — E78.5 DYSLIPIDEMIA: Primary | ICD-10-CM

## 2025-08-29 PROCEDURE — 99214 OFFICE O/P EST MOD 30 MIN: CPT | Mod: PBBFAC | Performed by: NURSE PRACTITIONER

## 2025-08-29 PROCEDURE — 99999 PR PBB SHADOW E&M-EST. PATIENT-LVL IV: CPT | Mod: PBBFAC,,, | Performed by: NURSE PRACTITIONER

## 2025-08-29 RX ORDER — TIRZEPATIDE 15 MG/.5ML
15 INJECTION, SOLUTION SUBCUTANEOUS
Qty: 2 ML | Refills: 1 | Status: SHIPPED | OUTPATIENT
Start: 2025-08-29

## (undated) DEVICE — NDL SPINAL SPINOCAN 22GX3.5

## (undated) DEVICE — GLOVE SURGICAL LATEX SZ 7

## (undated) DEVICE — APPLICATOR CHLORAPREP CLR 10.5

## (undated) DEVICE — PAD ELECTROSURGICAL PAT PLATE

## (undated) DEVICE — TRAY NERVE BLOCK

## (undated) DEVICE — BLADE SURG CARBON STEEL SZ11

## (undated) DEVICE — NDL 18GA X1 1/2 REG BEVEL

## (undated) DEVICE — TOWEL OR DISP STRL BLUE 4/PK

## (undated) DEVICE — NDL TUOHY EPIDURAL 20G X 3.5

## (undated) DEVICE — DRAPE C ARM 42 X 120 10/BX

## (undated) DEVICE — DRAPE LAP T SHT W/ INSTR PAD

## (undated) DEVICE — MARKER SKIN STND TIP BLUE BARR

## (undated) DEVICE — DRAPE C-ARMOR EQUIPMENT COVER

## (undated) DEVICE — Device

## (undated) DEVICE — MARKER SKIN RULER STERILE

## (undated) DEVICE — SYR GLASS 5CC LUER LOK

## (undated) DEVICE — SEE MEDLINE ITEM 152622

## (undated) DEVICE — HANDLE SURG LIGHT NONRIGID

## (undated) DEVICE — DRAPE INCISE IOBAN 2 23X17IN

## (undated) DEVICE — ADHESIVE DERMABOND ADVANCED

## (undated) DEVICE — CANNULA CVD 100MM X 20G

## (undated) DEVICE — GLOVE SENSICARE PI MICRO 7

## (undated) DEVICE — CHLORAPREP W TINT 26ML APPL

## (undated) DEVICE — DURAPREP SURG SCRUB 26ML

## (undated) DEVICE — BNDG COFLEX FOAM LF2 ST 4X5YD

## (undated) DEVICE — REMOVER LOTION